# Patient Record
Sex: FEMALE | Race: BLACK OR AFRICAN AMERICAN | NOT HISPANIC OR LATINO | Employment: FULL TIME | URBAN - METROPOLITAN AREA
[De-identification: names, ages, dates, MRNs, and addresses within clinical notes are randomized per-mention and may not be internally consistent; named-entity substitution may affect disease eponyms.]

---
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
## 2022-01-01 ENCOUNTER — HOSPITAL ENCOUNTER (INPATIENT)
Facility: CLINIC | Age: 22
LOS: 1 days | Discharge: HOME | DRG: 832 | End: 2022-01-03
Attending: EMERGENCY MEDICINE | Admitting: INTERNAL MEDICINE
Payer: COMMERCIAL

## 2022-01-01 ENCOUNTER — APPOINTMENT (OUTPATIENT)
Dept: ULTRASOUND IMAGING | Facility: CLINIC | Age: 22
DRG: 832 | End: 2022-01-01
Attending: EMERGENCY MEDICINE
Payer: COMMERCIAL

## 2022-01-01 DIAGNOSIS — R11.2 NAUSEA AND VOMITING, INTRACTABILITY OF VOMITING NOT SPECIFIED, UNSPECIFIED VOMITING TYPE: Primary | ICD-10-CM

## 2022-01-01 DIAGNOSIS — O24.011 PRE-EXISTING TYPE 1 DIABETES MELLITUS DURING PREGNANCY IN FIRST TRIMESTER: ICD-10-CM

## 2022-01-01 DIAGNOSIS — R11.10 HABIT VOMITING: ICD-10-CM

## 2022-01-01 DIAGNOSIS — E10.65 TYPE 1 DIABETES MELLITUS WITH HYPERGLYCEMIA (H): ICD-10-CM

## 2022-01-01 DIAGNOSIS — Z3A.08 8 WEEKS GESTATION OF PREGNANCY: ICD-10-CM

## 2022-01-01 DIAGNOSIS — Z11.52 ENCOUNTER FOR SCREENING LABORATORY TESTING FOR SEVERE ACUTE RESPIRATORY SYNDROME CORONAVIRUS 2 (SARS-COV-2): ICD-10-CM

## 2022-01-01 DIAGNOSIS — O24.011 PRE-EXISTING TYPE 1 DIABETES MELLITUS IN PREGNANCY IN FIRST TRIMESTER: ICD-10-CM

## 2022-01-01 DIAGNOSIS — O99.891 MATERNAL BLOOD TRANSFUSION: ICD-10-CM

## 2022-01-01 DIAGNOSIS — O21.0 HYPEREMESIS GRAVIDARUM: ICD-10-CM

## 2022-01-01 LAB
ALBUMIN SERPL-MCNC: 3.4 G/DL (ref 3.4–5)
ALBUMIN UR-MCNC: 70 MG/DL
ALP SERPL-CCNC: 98 U/L (ref 40–150)
ALT SERPL W P-5'-P-CCNC: 13 U/L (ref 0–50)
ANION GAP SERPL CALCULATED.3IONS-SCNC: 9 MMOL/L (ref 3–14)
APPEARANCE UR: ABNORMAL
AST SERPL W P-5'-P-CCNC: 11 U/L (ref 0–35)
BASOPHILS # BLD AUTO: 0 10E3/UL (ref 0–0.2)
BASOPHILS NFR BLD AUTO: 0 %
BILIRUB SERPL-MCNC: 0.5 MG/DL (ref 0.2–1.3)
BILIRUB UR QL STRIP: NEGATIVE
BUN SERPL-MCNC: 9 MG/DL (ref 7–30)
CALCIUM SERPL-MCNC: 9.1 MG/DL (ref 8.5–10.1)
CHLORIDE BLD-SCNC: 107 MMOL/L (ref 96–110)
CO2 SERPL-SCNC: 22 MMOL/L (ref 20–32)
COLOR UR AUTO: ABNORMAL
CREAT SERPL-MCNC: 0.55 MG/DL (ref 0.5–1)
EOSINOPHIL # BLD AUTO: 0 10E3/UL (ref 0–0.7)
EOSINOPHIL NFR BLD AUTO: 0 %
ERYTHROCYTE [DISTWIDTH] IN BLOOD BY AUTOMATED COUNT: 13.8 % (ref 10–15)
GFR SERPL CREATININE-BSD FRML MDRD: >90 ML/MIN/1.73M2
GLUCOSE BLD-MCNC: 98 MG/DL (ref 70–99)
GLUCOSE BLDC GLUCOMTR-MCNC: 82 MG/DL (ref 70–99)
GLUCOSE UR STRIP-MCNC: NEGATIVE MG/DL
HCG UR QL: POSITIVE
HCO3 BLDV-SCNC: 20 MMOL/L (ref 21–28)
HCT VFR BLD AUTO: 34.3 % (ref 35–47)
HGB BLD-MCNC: 11.6 G/DL (ref 11.7–15.7)
HGB UR QL STRIP: NEGATIVE
IMM GRANULOCYTES # BLD: 0 10E3/UL
IMM GRANULOCYTES NFR BLD: 0 %
KETONES UR STRIP-MCNC: 100 MG/DL
LACTATE BLD-SCNC: 0.6 MMOL/L
LEUKOCYTE ESTERASE UR QL STRIP: ABNORMAL
LIPASE SERPL-CCNC: 75 U/L (ref 73–393)
LYMPHOCYTES # BLD AUTO: 2.6 10E3/UL (ref 0.8–5.3)
LYMPHOCYTES NFR BLD AUTO: 33 %
MAGNESIUM SERPL-MCNC: 1.6 MG/DL (ref 1.6–2.3)
MCH RBC QN AUTO: 27.9 PG (ref 26.5–33)
MCHC RBC AUTO-ENTMCNC: 33.8 G/DL (ref 31.5–36.5)
MCV RBC AUTO: 83 FL (ref 78–100)
MONOCYTES # BLD AUTO: 0.4 10E3/UL (ref 0–1.3)
MONOCYTES NFR BLD AUTO: 5 %
MUCOUS THREADS #/AREA URNS LPF: PRESENT /LPF
NEUTROPHILS # BLD AUTO: 4.7 10E3/UL (ref 1.6–8.3)
NEUTROPHILS NFR BLD AUTO: 62 %
NITRATE UR QL: NEGATIVE
NRBC # BLD AUTO: 0 10E3/UL
NRBC BLD AUTO-RTO: 0 /100
PCO2 BLDV: 32 MM HG (ref 40–50)
PH BLDV: 7.41 [PH] (ref 7.32–7.43)
PH UR STRIP: 6.5 [PH] (ref 5–7)
PLATELET # BLD AUTO: 301 10E3/UL (ref 150–450)
PO2 BLDV: 56 MM HG (ref 25–47)
POTASSIUM BLD-SCNC: 3.7 MMOL/L (ref 3.4–5.3)
PROT SERPL-MCNC: 7.7 G/DL (ref 6.8–8.8)
RBC # BLD AUTO: 4.16 10E6/UL (ref 3.8–5.2)
RBC URINE: 1 /HPF
SAO2 % BLDV: 90 % (ref 94–100)
SODIUM SERPL-SCNC: 138 MMOL/L (ref 133–144)
SP GR UR STRIP: 1.02 (ref 1–1.03)
SQUAMOUS EPITHELIAL: 17 /HPF
TRANSITIONAL EPI: 1 /HPF
TSH SERPL DL<=0.005 MIU/L-ACNC: 0.67 MU/L (ref 0.4–4)
UROBILINOGEN UR STRIP-MCNC: NORMAL MG/DL
WBC # BLD AUTO: 7.7 10E3/UL (ref 4–11)
WBC URINE: 1 /HPF

## 2022-01-01 PROCEDURE — 250N000013 HC RX MED GY IP 250 OP 250 PS 637: Performed by: EMERGENCY MEDICINE

## 2022-01-01 PROCEDURE — 250N000011 HC RX IP 250 OP 636: Performed by: EMERGENCY MEDICINE

## 2022-01-01 PROCEDURE — 82803 BLOOD GASES ANY COMBINATION: CPT

## 2022-01-01 PROCEDURE — 83735 ASSAY OF MAGNESIUM: CPT | Performed by: EMERGENCY MEDICINE

## 2022-01-01 PROCEDURE — 99207 PR APP CREDIT; MD BILLING SHARED VISIT: CPT | Performed by: PHYSICIAN ASSISTANT

## 2022-01-01 PROCEDURE — 84443 ASSAY THYROID STIM HORMONE: CPT | Performed by: EMERGENCY MEDICINE

## 2022-01-01 PROCEDURE — 99285 EMERGENCY DEPT VISIT HI MDM: CPT | Performed by: EMERGENCY MEDICINE

## 2022-01-01 PROCEDURE — G0378 HOSPITAL OBSERVATION PER HR: HCPCS

## 2022-01-01 PROCEDURE — 80053 COMPREHEN METABOLIC PANEL: CPT | Performed by: EMERGENCY MEDICINE

## 2022-01-01 PROCEDURE — 81025 URINE PREGNANCY TEST: CPT | Performed by: EMERGENCY MEDICINE

## 2022-01-01 PROCEDURE — 99285 EMERGENCY DEPT VISIT HI MDM: CPT | Mod: 25 | Performed by: EMERGENCY MEDICINE

## 2022-01-01 PROCEDURE — C9803 HOPD COVID-19 SPEC COLLECT: HCPCS | Performed by: EMERGENCY MEDICINE

## 2022-01-01 PROCEDURE — 258N000003 HC RX IP 258 OP 636: Performed by: EMERGENCY MEDICINE

## 2022-01-01 PROCEDURE — 85025 COMPLETE CBC W/AUTO DIFF WBC: CPT | Performed by: EMERGENCY MEDICINE

## 2022-01-01 PROCEDURE — 81001 URINALYSIS AUTO W/SCOPE: CPT | Performed by: EMERGENCY MEDICINE

## 2022-01-01 PROCEDURE — 99219 PR INITIAL OBSERVATION CARE,LEVEL II: CPT | Mod: FS | Performed by: INTERNAL MEDICINE

## 2022-01-01 PROCEDURE — 96374 THER/PROPH/DIAG INJ IV PUSH: CPT | Performed by: EMERGENCY MEDICINE

## 2022-01-01 PROCEDURE — U0003 INFECTIOUS AGENT DETECTION BY NUCLEIC ACID (DNA OR RNA); SEVERE ACUTE RESPIRATORY SYNDROME CORONAVIRUS 2 (SARS-COV-2) (CORONAVIRUS DISEASE [COVID-19]), AMPLIFIED PROBE TECHNIQUE, MAKING USE OF HIGH THROUGHPUT TECHNOLOGIES AS DESCRIBED BY CMS-2020-01-R: HCPCS | Performed by: EMERGENCY MEDICINE

## 2022-01-01 PROCEDURE — 96361 HYDRATE IV INFUSION ADD-ON: CPT | Performed by: EMERGENCY MEDICINE

## 2022-01-01 PROCEDURE — 83690 ASSAY OF LIPASE: CPT | Performed by: EMERGENCY MEDICINE

## 2022-01-01 PROCEDURE — 76801 OB US < 14 WKS SINGLE FETUS: CPT

## 2022-01-01 PROCEDURE — 36415 COLL VENOUS BLD VENIPUNCTURE: CPT | Performed by: EMERGENCY MEDICINE

## 2022-01-01 RX ORDER — SODIUM CHLORIDE 9 MG/ML
INJECTION, SOLUTION INTRAVENOUS CONTINUOUS
Status: DISCONTINUED | OUTPATIENT
Start: 2022-01-01 | End: 2022-01-03 | Stop reason: HOSPADM

## 2022-01-01 RX ORDER — METOCLOPRAMIDE HYDROCHLORIDE 5 MG/ML
10 INJECTION INTRAMUSCULAR; INTRAVENOUS ONCE
Status: COMPLETED | OUTPATIENT
Start: 2022-01-01 | End: 2022-01-01

## 2022-01-01 RX ORDER — PRENATAL VIT/IRON FUM/FOLIC AC 27MG-0.8MG
1 TABLET ORAL DAILY
Status: DISCONTINUED | OUTPATIENT
Start: 2022-01-02 | End: 2022-01-03 | Stop reason: HOSPADM

## 2022-01-01 RX ORDER — PYRIDOXINE HCL (VITAMIN B6) 25 MG
25 TABLET ORAL ONCE
Status: COMPLETED | OUTPATIENT
Start: 2022-01-01 | End: 2022-01-01

## 2022-01-01 RX ADMIN — SODIUM CHLORIDE 1000 ML: 9 INJECTION, SOLUTION INTRAVENOUS at 22:49

## 2022-01-01 RX ADMIN — SODIUM CHLORIDE 1000 ML: 9 INJECTION, SOLUTION INTRAVENOUS at 20:39

## 2022-01-01 RX ADMIN — DOXYLAMINE SUCCINATE 25 MG: 25 TABLET ORAL at 22:45

## 2022-01-01 RX ADMIN — METOCLOPRAMIDE HYDROCHLORIDE 10 MG: 5 INJECTION INTRAMUSCULAR; INTRAVENOUS at 20:39

## 2022-01-01 RX ADMIN — Medication 25 MG: at 22:45

## 2022-01-01 ASSESSMENT — ENCOUNTER SYMPTOMS
DYSURIA: 0
ABDOMINAL PAIN: 0
APPETITE CHANGE: 1
VOMITING: 1
NAUSEA: 1
COUGH: 0
DIARRHEA: 1
CONSTIPATION: 0
SHORTNESS OF BREATH: 0
FEVER: 0

## 2022-01-02 PROBLEM — O24.011 PRE-EXISTING TYPE 1 DIABETES MELLITUS DURING PREGNANCY IN FIRST TRIMESTER: Status: ACTIVE | Noted: 2022-01-02

## 2022-01-02 LAB
ABO/RH(D): NORMAL
ANION GAP SERPL CALCULATED.3IONS-SCNC: 10 MMOL/L (ref 3–14)
ANTIBODY SCREEN: NEGATIVE
BUN SERPL-MCNC: 9 MG/DL (ref 7–30)
C TRACH DNA SPEC QL NAA+PROBE: NEGATIVE
CALCIUM SERPL-MCNC: 8.2 MG/DL (ref 8.5–10.1)
CHLORIDE BLD-SCNC: 109 MMOL/L (ref 96–110)
CO2 SERPL-SCNC: 19 MMOL/L (ref 20–32)
CREAT SERPL-MCNC: 0.63 MG/DL (ref 0.5–1)
ERYTHROCYTE [DISTWIDTH] IN BLOOD BY AUTOMATED COUNT: 13.9 % (ref 10–15)
GFR SERPL CREATININE-BSD FRML MDRD: >90 ML/MIN/1.73M2
GLUCOSE BLD-MCNC: 234 MG/DL (ref 70–99)
GLUCOSE BLDC GLUCOMTR-MCNC: 132 MG/DL (ref 70–99)
GLUCOSE BLDC GLUCOMTR-MCNC: 150 MG/DL (ref 70–99)
GLUCOSE BLDC GLUCOMTR-MCNC: 198 MG/DL (ref 70–99)
GLUCOSE BLDC GLUCOMTR-MCNC: 211 MG/DL (ref 70–99)
GLUCOSE BLDC GLUCOMTR-MCNC: 220 MG/DL (ref 70–99)
GLUCOSE BLDC GLUCOMTR-MCNC: 225 MG/DL (ref 70–99)
HCT VFR BLD AUTO: 32.4 % (ref 35–47)
HGB BLD-MCNC: 10.4 G/DL (ref 11.7–15.7)
MCH RBC QN AUTO: 27.5 PG (ref 26.5–33)
MCHC RBC AUTO-ENTMCNC: 32.1 G/DL (ref 31.5–36.5)
MCV RBC AUTO: 86 FL (ref 78–100)
N GONORRHOEA DNA SPEC QL NAA+PROBE: NEGATIVE
PLATELET # BLD AUTO: 237 10E3/UL (ref 150–450)
POTASSIUM BLD-SCNC: 3.9 MMOL/L (ref 3.4–5.3)
RBC # BLD AUTO: 3.78 10E6/UL (ref 3.8–5.2)
SARS-COV-2 RNA RESP QL NAA+PROBE: NEGATIVE
SODIUM SERPL-SCNC: 138 MMOL/L (ref 133–144)
SPECIMEN EXPIRATION DATE: NORMAL
WBC # BLD AUTO: 6.7 10E3/UL (ref 4–11)

## 2022-01-02 PROCEDURE — 87506 IADNA-DNA/RNA PROBE TQ 6-11: CPT | Performed by: PHYSICIAN ASSISTANT

## 2022-01-02 PROCEDURE — 250N000012 HC RX MED GY IP 250 OP 636 PS 637: Performed by: NURSE PRACTITIONER

## 2022-01-02 PROCEDURE — 250N000013 HC RX MED GY IP 250 OP 250 PS 637: Performed by: PHYSICIAN ASSISTANT

## 2022-01-02 PROCEDURE — 99232 SBSQ HOSP IP/OBS MODERATE 35: CPT | Performed by: INTERNAL MEDICINE

## 2022-01-02 PROCEDURE — 120N000002 HC R&B MED SURG/OB UMMC

## 2022-01-02 PROCEDURE — 99223 1ST HOSP IP/OBS HIGH 75: CPT | Performed by: NURSE PRACTITIONER

## 2022-01-02 PROCEDURE — 82962 GLUCOSE BLOOD TEST: CPT

## 2022-01-02 PROCEDURE — G0378 HOSPITAL OBSERVATION PER HR: HCPCS

## 2022-01-02 PROCEDURE — 96372 THER/PROPH/DIAG INJ SC/IM: CPT

## 2022-01-02 PROCEDURE — 82310 ASSAY OF CALCIUM: CPT | Performed by: PHYSICIAN ASSISTANT

## 2022-01-02 PROCEDURE — 36415 COLL VENOUS BLD VENIPUNCTURE: CPT | Performed by: PHYSICIAN ASSISTANT

## 2022-01-02 PROCEDURE — 87591 N.GONORRHOEAE DNA AMP PROB: CPT | Performed by: PHYSICIAN ASSISTANT

## 2022-01-02 PROCEDURE — 96372 THER/PROPH/DIAG INJ SC/IM: CPT | Performed by: NURSE PRACTITIONER

## 2022-01-02 PROCEDURE — 36415 COLL VENOUS BLD VENIPUNCTURE: CPT | Performed by: EMERGENCY MEDICINE

## 2022-01-02 PROCEDURE — 87491 CHLMYD TRACH DNA AMP PROBE: CPT | Performed by: PHYSICIAN ASSISTANT

## 2022-01-02 PROCEDURE — 86901 BLOOD TYPING SEROLOGIC RH(D): CPT | Performed by: EMERGENCY MEDICINE

## 2022-01-02 PROCEDURE — 85027 COMPLETE CBC AUTOMATED: CPT | Performed by: PHYSICIAN ASSISTANT

## 2022-01-02 PROCEDURE — 258N000003 HC RX IP 258 OP 636: Performed by: EMERGENCY MEDICINE

## 2022-01-02 PROCEDURE — 87493 C DIFF AMPLIFIED PROBE: CPT | Performed by: PHYSICIAN ASSISTANT

## 2022-01-02 PROCEDURE — 250N000011 HC RX IP 250 OP 636: Performed by: PHYSICIAN ASSISTANT

## 2022-01-02 PROCEDURE — 96375 TX/PRO/DX INJ NEW DRUG ADDON: CPT

## 2022-01-02 PROCEDURE — 250N000012 HC RX MED GY IP 250 OP 636 PS 637: Performed by: PHYSICIAN ASSISTANT

## 2022-01-02 PROCEDURE — 96372 THER/PROPH/DIAG INJ SC/IM: CPT | Performed by: PHYSICIAN ASSISTANT

## 2022-01-02 RX ORDER — DEXTROSE MONOHYDRATE 25 G/50ML
25-50 INJECTION, SOLUTION INTRAVENOUS
Status: DISCONTINUED | OUTPATIENT
Start: 2022-01-02 | End: 2022-01-03 | Stop reason: HOSPADM

## 2022-01-02 RX ORDER — ONDANSETRON 4 MG/1
4 TABLET, ORALLY DISINTEGRATING ORAL EVERY 6 HOURS PRN
Status: DISCONTINUED | OUTPATIENT
Start: 2022-01-02 | End: 2022-01-03 | Stop reason: HOSPADM

## 2022-01-02 RX ORDER — ONDANSETRON 2 MG/ML
4 INJECTION INTRAMUSCULAR; INTRAVENOUS EVERY 6 HOURS PRN
Status: DISCONTINUED | OUTPATIENT
Start: 2022-01-02 | End: 2022-01-03 | Stop reason: HOSPADM

## 2022-01-02 RX ORDER — FAMOTIDINE 20 MG/1
20 TABLET, FILM COATED ORAL 2 TIMES DAILY
Status: DISCONTINUED | OUTPATIENT
Start: 2022-01-02 | End: 2022-01-03 | Stop reason: HOSPADM

## 2022-01-02 RX ORDER — LIDOCAINE 40 MG/G
CREAM TOPICAL
Status: DISCONTINUED | OUTPATIENT
Start: 2022-01-02 | End: 2022-01-03 | Stop reason: HOSPADM

## 2022-01-02 RX ORDER — NICOTINE POLACRILEX 4 MG
15-30 LOZENGE BUCCAL
Status: DISCONTINUED | OUTPATIENT
Start: 2022-01-02 | End: 2022-01-03 | Stop reason: HOSPADM

## 2022-01-02 RX ORDER — PYRIDOXINE HCL (VITAMIN B6) 25 MG
25 TABLET ORAL 3 TIMES DAILY
Status: DISCONTINUED | OUTPATIENT
Start: 2022-01-02 | End: 2022-01-03 | Stop reason: HOSPADM

## 2022-01-02 RX ADMIN — Medication 25 MG: at 20:07

## 2022-01-02 RX ADMIN — SODIUM CHLORIDE: 9 INJECTION, SOLUTION INTRAVENOUS at 00:22

## 2022-01-02 RX ADMIN — DOXYLAMINE SUCCINATE 25 MG: 25 TABLET ORAL at 22:02

## 2022-01-02 RX ADMIN — ONDANSETRON 4 MG: 2 INJECTION INTRAMUSCULAR; INTRAVENOUS at 06:38

## 2022-01-02 RX ADMIN — INSULIN ASPART 2 UNITS: 100 INJECTION, SOLUTION INTRAVENOUS; SUBCUTANEOUS at 10:12

## 2022-01-02 RX ADMIN — FAMOTIDINE 20 MG: 20 TABLET ORAL at 09:06

## 2022-01-02 RX ADMIN — INSULIN ASPART 1 UNITS: 100 INJECTION, SOLUTION INTRAVENOUS; SUBCUTANEOUS at 01:56

## 2022-01-02 RX ADMIN — FAMOTIDINE 20 MG: 20 TABLET ORAL at 20:07

## 2022-01-02 RX ADMIN — INSULIN ASPART 2 UNITS: 100 INJECTION, SOLUTION INTRAVENOUS; SUBCUTANEOUS at 14:26

## 2022-01-02 RX ADMIN — INSULIN ASPART 3 UNITS: 100 INJECTION, SOLUTION INTRAVENOUS; SUBCUTANEOUS at 15:27

## 2022-01-02 RX ADMIN — INSULIN ASPART 4 UNITS: 100 INJECTION, SOLUTION INTRAVENOUS; SUBCUTANEOUS at 19:03

## 2022-01-02 RX ADMIN — INSULIN GLARGINE 6 UNITS: 100 INJECTION, SOLUTION SUBCUTANEOUS at 09:06

## 2022-01-02 RX ADMIN — DEXTROSE AND SODIUM CHLORIDE: 5; 900 INJECTION, SOLUTION INTRAVENOUS at 00:23

## 2022-01-02 RX ADMIN — INSULIN ASPART 2 UNITS: 100 INJECTION, SOLUTION INTRAVENOUS; SUBCUTANEOUS at 06:21

## 2022-01-02 ASSESSMENT — ACTIVITIES OF DAILY LIVING (ADL)
ADLS_ACUITY_SCORE: 3
ADLS_ACUITY_SCORE: 3
DIFFICULTY_EATING/SWALLOWING: NO
ADLS_ACUITY_SCORE: 3
DOING_ERRANDS_INDEPENDENTLY_DIFFICULTY: NO
ADLS_ACUITY_SCORE: 3
DRESSING/BATHING_DIFFICULTY: NO
ADLS_ACUITY_SCORE: 3
ADLS_ACUITY_SCORE: 3
TOILETING_ISSUES: NO
ADLS_ACUITY_SCORE: 3
ADLS_ACUITY_SCORE: 7
ADLS_ACUITY_SCORE: 3
DIFFICULTY_COMMUNICATING: NO
FALL_HISTORY_WITHIN_LAST_SIX_MONTHS: NO
WALKING_OR_CLIMBING_STAIRS_DIFFICULTY: NO
ADLS_ACUITY_SCORE: 7
ADLS_ACUITY_SCORE: 3

## 2022-01-02 ASSESSMENT — MIFFLIN-ST. JEOR: SCORE: 1263.05

## 2022-01-02 NOTE — PLAN OF CARE
"VS: /71   Pulse 88   Temp 98.8  F (37.1  C) (Oral)   Resp 16   Ht 1.499 m (4' 11\")   Wt 58.2 kg (128 lb 6.4 oz)   LMP  (LMP Unknown)   SpO2 97%   BMI 25.93 kg/m     O2: >95% on RA   Output: Voiding without difficulty   Last BM: LBM (Smear) overnight, BS+, denies having any stools today.   Activity: Independent   Skin: Intact ex scabbing/bruising to bilateral knees/shins   Pain: Denies   CMS: Intact   Dressing: N/A   Diet: Nausea improving   PRN IV zofran not given, T1DM, q4 BG, elevated with insulin given per orders. Carb coverage and sliding scale    LDA: R arm infusing continuous NS   Equipment: IV pole   Plan: Continue to monitor   Additional Info: 9 weeks pregnant     "

## 2022-01-02 NOTE — UTILIZATION REVIEW
Admission Status; Secondary Review Determination         Under the authority of the Utilization Management Committee, the utilization review process indicated a secondary review on the above patient.  The review outcome is based on review of the medical records, discussions with staff, and applying clinical experience noted on the date of the review.        (x)      Inpatient Status Appropriate - This patient's medical care is consistent with medical management for inpatient care and reasonable inpatient medical practice.      RATIONALE FOR DETERMINATION   The patient is a 21-year-old female  1 para 0 with type 1 diabetes mellitus who was admitted to the hospital on 2022.  She is approximately 8 weeks and 4 days pregnant and has had intractable nausea and vomiting is unable to maintain fluid or any oral intake she is 9 weeks and 2 days based on last menstrual period.  Endocrinology has been consulted.  Type 1 diabetes mellitus creates additional risk with persisting nausea and vomiting she is also had watery diarrhea associated with cramping she has had some low blood sugars possibly bordering on DKA based on previous experience and blood sugar of 64 at home.  Based on pregnancy with diabetes mellitus type 1 and intractable vomiting possibly related to hyperemesis gravidarum or other cause, inpatient status continues to be appropriate given inability to function outside the hospital without supportive IV fluids and assistance in managing diabetes      The severity of illness, intensity of service provided, expected LOS and risk for adverse outcome make the care complex, high risk and appropriate for hospital admission.        The information on this document is developed by the utilization review team in order for the business office to ensure compliance.  This only denotes the appropriateness of proper admission status and does not reflect the quality of care rendered.         The definitions of  Inpatient Status and Observation Status used in making the determination above are those provided in the CMS Coverage Manual, Chapter 1 and Chapter 6, section 70.4.      Sincerely,     Raheem Durand MD  Physician Advisor  Utilization Review/ Case Management  [facility].

## 2022-01-02 NOTE — CONSULTS
"In-Patient Diabetes/Hyperglycemia Management Consult    Chief Complaint:  \"8-9 wks pregnant, DM I, advise for insulin adjustments\"    Consult requested by: Tiny Rea PA    All information gathered via chart review and face-to-face interview and assessment    History of Present Illness:  Myriam Wylie is a 21 year old woman with a history of DM I, DKA, and C. difficile (2016) who is 8 wks 4 days pregnant and presented to ED  with intractable nausea and vomiting .  She is being admitted for continued management of nausea & vomiting, IV fluid replenishment, and stabilization of blood sugars.    Diabetes:    Ms. Myriam Wylie is a 21 year old diagnosed with T1DM 3/28/2005 at approximately 2.5 years of age.  She is known to have hx of several (>10-15) DKA episodes per recollection.  Myriam is followed by Dr. Padmini Hamilton [facility] and was last seen 11/18/2021, A1c 8.8%.  Prior to that visit was May 2020 via telehealth and prior in-person clinic appointment was 10/2019.    Primary Endocrinologist is not yet aware patient is pregnant.     Clarice uses CDI and dexcom for diabetes control, she has tried an amb pump in past when she was much younger but this did not work and would become disconnected easily in her sleep resulting in DKA episodes.  Confirmed per chart review when she was seeing SAMANTHA Singh and DRAKE Rayo of  of  she was using an amb pump - model not clearly indicated.    She prefers MDI/dexcom. She is not wearing Dexcom now, she was having difficulty with insurance and has not been wearing since at or around her last clinic appointment. Is comfortable with fingersticks but prefers Dexcom.     Dexcom reading on that last visit 11/18/2021:    Below 54 mg/dL Less than 1%  Below 70 mg/dL Less then 4%  70 - 180 mg/dL 70% or more  Above 180 mg/dL Less than 25%  Above 250 mg/dL Less than 5%   Coefficient of Ellen 36% or less      At her last visit, her Lantus was reduced " following frequent low BGs to 14 unit(s) per day from 15 unit(s) per day  Novolog 1 per 10 g cho from 1 per 7 g cho meals/snacks   Novolog sliding scale insulin has remained 1:50>150 mg/dL    Fingerstick BG trends per patient:    AM - 300's (does not get up until about noon)  Noon - see above  Dinner - 180 - 300  HS - 120 - 250  Infrequent lows    In the event of lows, nik Rubin) has the dexcom opal on his phone and will wake up and assist Makenzie with getting juice prn, this is effective when dexcom set up and working.     Labs:  Covid 19 negative    Hgb 11.6->10.4  K and NA - wnl  No AG   Creat 0.55/GFR >90  Ketones - present in UA   Hgb A1c 8.7%  C-diff - to be collected    TSH 0.67      BG trend since admission:    AT time of consult:  q4h BG monitoring with medium resistance sliding scale insulin ordered  No basal  No prandial      Most recently Myriam has been compliant with Lantus 14 unit(s) daily at 1200 noon, last dose of 12 unit(s) taken on day of presentation (1/1/2022) at 12:30 per patient report. She reduced 2/2 ongoing N/V.    Does typically take Novolog 1 per 10 g cho and medium resistance sliding scale insulin.    She feel able to start PO and has apple and orange juice and yogurt at bedside.   Aware of the current orders and has no additional questions or concerns at time of consult    Confounding factors:  Hyperemesis gravidarum, no steroids, no dextrose fluids    ELOS:  TBD    Diabetes:  Recent Labs   Lab 01/02/22  0620 01/02/22  0551 01/02/22  0139 01/01/22 2011 01/01/22  1821   * 234* 150* 98 82       Diabetes Type:  Type 1 Diabetes  Diabetes Duration: age 4 -  3/28/2005    Usual Diabetes Regimen:     PTA Medications:   Lantus 14 unit(s) daily at 1200  Novolog 1 per 10 g cho from 1 per 7 g cho meals/snacks   Novolog sliding scale insulin has remained 1:50>150 mg/dL  Dexcom CGM      BG monitoring frequency:  Now using fingersticks, 2-3 times per day  Needs refills/insurance  "clarification for Dexcom      Diet: no restrictions  Bf: rarely eats  Lunch: snacks usually only - yogurt/chips  Dinner: varies - prefers chicken/beef, few vegetables, noodles/rice  Snacks: will chew apples or grapes and spit them out, fruit snack packs/crackers, PB/J or cheese crackers  Beverages: water, sometimes juice    Safety Kit:  Has glucose tabs but does not like them \"too chalky\", will use fruit snacks or juice  Hx of glucagon injectable - has not has a refill for some time.     Medic Alert: No - has medical information on iPhone and Apple watch      Ability to [place] Prescribed Regimen: TBD     Other active medical problems:    Diabetes Control:   Lab Results   Component Value Date    A1C 8.7 12/23/2021    A1C 9.5 04/17/2021    A1C 9.7 10/11/2019    A1C 11.3 09/15/2019    A1C 12.3 12/09/2018    A1C 11.7 12/08/2018     Diabetes Complications: no retinopathy, last dilated eye exam 3/2021, denies peripheral neuropathy, no nephropathy  History of DKA:   Yes > 10    Able to Detect Hypoglycemia: yes - uses Dexcom which helps ID lows especially at [facility]  Usual Diabetes Care Provider: Dr. Padmini Givens - [facility]    Factors Impacting Glucose Control:  Hyperemesis/acute illness, diet/eating changes, pregnancy    Review of Systems:    CC:  \"denies - nausea pretty much gone\"  Constitutional:   No fever, no chills  ENT/Mouth:   No hearing changes, no ear pain, no sore throat, no rhinorrhea, no difficulty swallowing  Eyes:  No eye pain, no discharge, no vision changes  CV:   No CP/SOB, no new edema  Resp:  No cough, no wheezing  GI:   No constipation, + diarrhea  :  No dysuria, no frequency, no hematuria  Musk:  No joint swelling/pain, No back pain  Skin/heme:   No new rashes/bruises/open areas.  No pruritis  Neuro:   No new weakness, no numbness/tingling, no headache  Psych:   No new anxiety, no depression  Endocrine:  No polyuria, no polydipsia      Past medical, family and social histories are reviewed " and updated.    Past Medical History  Past Medical History:   Diagnosis Date     C. difficile colitis      Diabetes type 1, uncontrolled (H)      DKA (diabetic ketoacidosis) (H)      PID (acute pelvic inflammatory disease)        Family History  Family History   Problem Relation Age of Onset     Diabetes No family hx of         type 1 diabetes or autoimmunity     Denies any family hx of DM    Social History  Social History     Socioeconomic History     Marital status: Single     Spouse name: None     Number of children: None     Years of education: None     Highest education level: None   Occupational History     None   Tobacco Use     Smoking status: Never Smoker     Smokeless tobacco: Never Used   Substance and Sexual Activity     Alcohol use: Not Currently     Drug use: No     Sexual activity: Yes     Partners: Female, Male     Birth control/protection: None     Comment: h/o PID   Other Topics Concern     None   Social History Narrative    Myriam lives with her mother and step father.  She reports that she has 8 siblings on her mother's side and 11 on her father's side, all half siblings.  She is in the 7th grade after being held back a couple years ago due to missing so much school.  She is a good student, however, currently getting all A's. Favorite subject is math.  In the future she wants to be a , a shen or an [employer] agent.        Children's may be getting Child Protection involved.        Dec 2015--this is a child protection case.  Mom and Dad both here today.  Dad and Clarice blame each other for her not getting her cares done, mom says she used to take care of Clarice's diabetes but hasn't been because she works.  Says she is now going to start doing so.        Jan 2016-excited about her new phone.  Mom gave it to her with the condition that she test 4x/day but thusfar this isn't happening.        March 2016-wants to start volleyball. Still an open child protection case.        May 2016.  Clarice is  in a group home, which she hates.  There is concern that she is manipulating her blood glucose levels to try to get out of the group home.        June 2016. Clarice has been at [facility] 2 months.  She wants to go home.  Glucose control has been steadily improving while she is there, so the structure has been good for her.        Sept 2016.  In a new foster home which she likes (this woman has previously done respite care for her), but it can only last until early Oct when this woman goes out of town.  She was diagnosed with an eating disorder and has started the [facility].        October 2016.  Still in the foster home she was in last month ago but it is not clear how involved this woman is with her diabetes.  She is going home for a week tomorrow while the foster mom is on vacation.  Now it has been determined (as I have all along maintained) that she does not have an eating disorder.        Dec 2016. Back with Mom.  They don't have a place of their own yet so they are staying with a friend of Mom in a 1 bedroom apartment in [place].  They sleep on the couch pull-out.  Mom drops Clarice off at school on her way to work. Clarice says kids in school are mean to her.        Feb 2017. Out of strips because of confusing insurance issue.  For some reason she is on [employer] Plus for this month only but then March 1 goes to [employer] but then April may go back to [place]. The problem comes up because each plan allows different meters and strips.  She is doing a dance program after school twice a week and loves it.        April 2017. Still open child protection case. I have been in contact with the guardian lisa murphy. She is on spring break, going into work each day with Mom at [employer].        May 2017. Got her new insulin pump on Tuesday May 2, excited about it.  Dance performances coming up.        June 2017--home with Mom, child protection still involved.  School just ended (8th grade).  No particular plans for summer but will  "be home alone and with her friends.  Plans to dance, walk and swim.        July 2017--Child protection still involved. Not really doing anything this summer but sleeping during the day and up on the computer at night.  Trying to decide between 3 high schools, all of which have accepted her.        August 2017. 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  Dance class a couple times a week, otherwise no exercise. Happy to be back on the pump.        Oct 2017.  Excited about performance she will have in January.        Nov 2017.  Mom is working long hours 6 days a week. They are trying to buy a house in [place].        Jan 2018.  Danny is enjoying school and is very excited because she is playing the lead in a play at the [employer] Jan 20 and 21.     Social Determinants of Health     Financial Resource Strain: Not on file   Food Insecurity: Not on file   Transportation Needs: Not on file   Physical Activity: Not on file   Stress: Not on file   Social Connections: Not on file   Intimate Partner Violence: Not on file   Housing Stability: Not on file       Physical Exam:  /62 (BP Location: Left arm)   Pulse 101   Temp 98.7  F (37.1  C) (Oral)   Resp 16   Ht 1.499 m (4' 11\")   Wt 58.2 kg (128 lb 6.4 oz)   LMP  (LMP Unknown)   SpO2 98%   BMI 25.93 kg/m        General:  pleasant sitting on bed, in no distress. Tray of juices, yogurt on table.   HEENT: NC/AT, PER and anicteric, non-injected, oral mucous membranes moist.   Lungs: non-labored, no cough, no SOB  ABD:  soft, non-tender  Skin: warm and dry, no obvious lesions  Feet:  CMS intact  MSK:  fluid movement of all extremities  Lymp:  no LE edema   Mental status:  alert, oriented x3, communicating clearly  Psych: calm, appropriate mood, appropriate/full affect      Laboratory  Recent Labs   Lab Test 01/02/22  0620 01/02/22  0551 01/02/22  0139 01/01/22 2011   NA  --  138  --  138   POTASSIUM  --  3.9  --  3.7 "   CHLORIDE  --  109  --  107   CO2  --  19*  --  22   ANIONGAP  --  10  --  9   * 234*   < > 98   BUN  --  9  --  9   CR  --  0.63  --  0.55   LILIANA  --  8.2*  --  9.1    < > = values in this interval not displayed.     CBC RESULTS:   Recent Labs   Lab Test 01/02/22  0551   WBC 6.7   RBC 3.78*   HGB 10.4*   HCT 32.4*   MCV 86   MCH 27.5   MCHC 32.1   RDW 13.9          Liver Function Studies -   Recent Labs   Lab Test 01/01/22 2011   PROTTOTAL 7.7   ALBUMIN 3.4   BILITOTAL 0.5   ALKPHOS 98   AST 11   ALT 13       Active Medications  Current Facility-Administered Medications   Medication     glucose gel 15-30 g    Or     dextrose 50 % injection 25-50 mL    Or     glucagon injection 1 mg     doxylamine (UNISOM) tablet 25 mg     famotidine (PEPCID) tablet 20 mg     insulin aspart (NovoLOG) injection (RAPID ACTING)     insulin glargine (LANTUS PEN) injection 6 Units     lidocaine (LMX4) cream     lidocaine 1 % 0.1-1 mL     ondansetron (ZOFRAN-ODT) ODT tab 4 mg    Or     ondansetron (ZOFRAN) injection 4 mg     prenatal multivitamin w/iron per tablet 1 tablet     pyridOXINE (VITAMIN B6) tablet 25 mg     sodium chloride (PF) 0.9% PF flush 3 mL     sodium chloride (PF) 0.9% PF flush 3 mL     sodium chloride 0.9% infusion     No current outpatient medications on file.       Current Diet  Orders Placed This Encounter      Advance Diet as Tolerated: Regular Diet Adult    Assessment:    1)  Type 1 Diabetes Mellitus; historically suboptimal control.  A1c 8.7%. No known diabetic complications. (anti-CHERI found in EPIC)  2)  Hyperemesis gravidarum  3)  Loose stools      Plan:      -  Give reduced dose of Lantus 6 unit(s) now this AM - will adjust as PO changes    -  Novolog 1 per 10 g cho now that po resumed TID AC/snacks    -  Novolog medium resistance sliding scale insulin q4h - adjusted for pregnancy 1:50>100 and 1:50>120    -  BG monitoring q4h, add 2 hr PP for when PO resumes    -  Hypoglycemia protocol    -  Carb  counting protocol    -  Education:  Will need outline for pregnancy targets for BG, not clear if additional Ed will be needed    -  Outpatient follow up: KEVIN Hamilton MD - next appointment scheduled for 2/22/2022 - will recommend sooner - 7-10 days following discharge.    -  Will follow    Management of Diabetes in pregnancy:    BG targets  Less than 95 fasting  Less than 140 one hour post-meal  Less than 120 two hours post-meal  Goal of 0 episodes of blood sugars less than 60     A1c goal in pregnancy: 6.5% or less      RASHID Velez CNP   Inpatient Diabetes Management Service  Pager - [phone]  Friday - Monday 0800 - 1600 hrs    To contact Endocrine Diabetes service:   From 8AM-4PM: page inpatient diabetes provider that is following the patient that day (see filed or incomplete progress notes/consult notes).  If uncertain of provider assignment: page job code [phone].  For questions or updates from 4PM-8AM: page the diabetes job code for on call fellow: [phone]    Please notify inpatient diabetes service if changes are planned to steroids, nutrition, or if procedures are planned requiring prolonged NPO status.Diabetes Management Team job code: [phone]       I spent a total of 80 minutes bedside and on the inpatient unit managing glycemic care.  Over 50% of my time on the unit was spent counseling the patient and/or coordinating care regarding acute hyperglycemic management.  See note for details.

## 2022-01-02 NOTE — PLAN OF CARE
Arrived to unit at 0100, oriented to room  VS: Vital signs:  Temp: 99.1  F (37.3  C) Temp src: Oral BP: 126/69 Pulse: 98   Resp: 16 SpO2: 99 % O2 Device: None (Room air)   O2: RA, lungs CTA, denies SOB/chest pain   Output: Voiding without difficulty   Last BM: LBM 1/1, reporting diarrhea, BS+   Activity: Independent   Skin: Intact ex scabbing/bruising to bilateral knees/shins   Pain: Denies   CMS: A&O x4, denies N/T   Dressing: None   Diet: Taking small sips of water overnight, continuous nausea  PRN IV zofran given x1, emesis once this shift  T1DM, q4 BG, 150 and 220 overnight with insulin given per orders   LDA: New PIV placed by RN flyer, R arm infusing   Equipment: IV pole   Plan: Continue to monitor   Additional Info: 9 weeks pregnant

## 2022-01-02 NOTE — PROGRESS NOTES
"[facility]    Medicine Progress Note - Hospitalist Service       Date of Admission:  1/1/2022    Assessment & Plan         Myriam Wylie is a 21 year old woman with a history of DM I, DKA, and C. difficile (2016) who is 8 wks 4 days pregnant and presented to ED  with intractable nausea and vomiting .  She is being admitted for continued management of nausea & vomiting, IV fluid replenishment, and stabilization of blood sugars     # Intractable Nausea, Vomiting, Loose Stools. Has been nauseous over several weeks of pregnancy but vomiting and loose stools are new since yesterday. No abdominal pain or  sx. Afebrile. No leukocytosis. Is unvaccinated for influenza and covid. Differential to include N/V related to 1st trimester pregnancy, hyperemesis gravidarum, +/- infectious gastroenteritis (including covid-19 in differential). LFTs wnl.   Continue IV fluids for now   - Influenza and COVID 19 negative   - ED has ordered stool MARIZA and C diff PCR., but rosalva has not had diarrhea since - sample awaited   - Schedule vitamin B6 and doxylamine.   - PRN antiemetics.   - Offer influenza and covid vaccines at discharge.         #Pregnancy. This is her 1st pregnancy. Had + home test and is 9 wks 2 days based on LMP, per prelim US report 8 wks 4 days.   - Final OB US read pending.   - Prenatal vitamin.   - Has appointment to establish OB care through [facility] on 2/9/22.          #DM I. HgbA1c 8.7% w/ prior hx of DKA (as recently as early December in setting of what she thought was food poisoning) but no evidence for this here. BG 82-98 in ED. Followed by [facility] endocrinology. Home regimen Lantus 15 units every afternoon, Novolog 1 unit per 10 g CHO, and correction scale 1:50. Has been too tightly controlled at home.   - For tonight, Q4hr BG checks w/ \"medium\" correction scale if needed.   - Already took home Lantus 15 units this afternoon. No long acting or carb " "coverage insulin has been ordered here; need to readdress tomorrow pending BG trend. Endocrinology consult has been placed.  Patient does not take her lantus regularly either. \" I take it if I eat something\". Given her inconsistency in long acting insulin administration, we will have out endocrinology team weigh in on this   - Has endocrinology f/u on 2/21 but this may need to be moved up pending endocrine reccs here and different needs during pregnancy.                Diet: Advance Diet as Tolerated: Regular Diet Adult    DVT Prophylaxis: Pneumatic Compression Devices  [place] Catheter: Not present  Central Lines: None  Code Status: Full Code      Disposition Plan Expected Discharge: 1/3/2021       The patient's care was discussed with the Bedside Nurse and Patient.    Marlo Sullivan MD  Hospitalist Service  [facility]  Securely message with the Vocera Web Console (learn more here)  Text page via "[employer] Paging/Directory        Clinically Significant Risk Factors Present on Admission          # Hypocalcemia: Ca = 8.2 mg/dL (Ref range: 8.5 - 10.1 mg/dL) and/or iCa = N/A on admission, will replace as needed       # Overweight: last Body mass index is 25.63 kg/m .      ______________________________________________________________________    Interval History   Patient continues to have nausea, with last episode of emesis at 630 am.  Has not been able to each much since  Last episode of diarrhea was last night   In a pleasant mood this morning. No fever or chills     Data reviewed today: I reviewed all medications, new labs and imaging results over the last 24 hours. I personally reviewed no images or EKG's today.    Physical Exam   Vital Signs: Temp: 99.1  F (37.3  C) Temp src: Oral BP: 126/69 Pulse: 98   Resp: 16 SpO2: 99 % O2 Device: None (Room air)    Weight: 126 lbs 14.4 oz  General Appearance: Awake, alert and not in distress  Respiratory: Clear breath " sounds bilaterally   Cardiovascular: Normal heart sounds. No murmurs   GI: Soft, non tender. Normal bowel sounds   Skin: No bruising or bleeding   Other:Awake, alert and orientated X 3       Data   Recent Labs   Lab 01/02/22  0620 01/02/22  0551 01/02/22  0139 01/01/22 2011   WBC  --  6.7  --  7.7   HGB  --  10.4*  --  11.6*   MCV  --  86  --  83   PLT  --  237  --  301   NA  --  138  --  138   POTASSIUM  --  3.9  --  3.7   CHLORIDE  --  109  --  107   CO2  --  19*  --  22   BUN  --  9  --  9   CR  --  0.63  --  0.55   ANIONGAP  --  10  --  9   LILIANA  --  8.2*  --  9.1   * 234* 150* 98   ALBUMIN  --   --   --  3.4   PROTTOTAL  --   --   --  7.7   BILITOTAL  --   --   --  0.5   ALKPHOS  --   --   --  98   ALT  --   --   --  13   AST  --   --   --  11   LIPASE  --   --   --  75

## 2022-01-02 NOTE — ED TRIAGE NOTES
Patient thinks she's going into DKA. History of DM 1. Has been vomiting, unable to keep any food down. Last BG at home was 64. 9 weeks pregnant.

## 2022-01-02 NOTE — ED PROVIDER NOTES
ED Provider Note  [facility]      History     Chief Complaint   Patient presents with     Hypoglycemia     9 weeks pregnant, last BG at home 64, thinks she going into DKA     Vomiting     HPI  Myriam Wylie is a 21 year old female who is 9 weeks pregnant with her first pregnancy based on last menstrual period, with PMH type 1 diabetes, DKA, C. difficile, and pelvic inflammatory disease, who presents to the Emergency Department for vomiting and hypoglycemia.  Patient states that she felt that she was going to an episode of DKA. She states that she has been not been able to keep down food.  Her last blood sugar at home was 64.  Patient had a positive home pregnancy test but has not had a formal ultrasound or laboratory confirmation.  She has had significant nausea since the onset of her pregnancy but over the past few days developed severe vomiting.  She has vomited and had diarrhea today innumerable times.  She is taking her normal insulin regimen.  She denies vaginal bleeding, discharge, odor, concern for STD.  She has had less than normal urine output.  Diarrhea is watery and associated with some abdominal cramping.  She has not been vaccinated against COVID-19 or the flu.  Denies recent antibiotics.          Past Medical History  Past Medical History:   Diagnosis Date     C. difficile colitis      Diabetes type 1, uncontrolled (H)      DKA (diabetic ketoacidosis) (H)      PID (acute pelvic inflammatory disease)      Past Surgical History:   Procedure Laterality Date     COLONOSCOPY N/A 9/18/2019    Procedure: COLONOSCOPY;  Surgeon: Jeremi Banks MD;  Location: [facility]      ESOPHAGOSCOPY, GASTROSCOPY, DUODENOSCOPY (EGD), COMBINED N/A 9/18/2019    Procedure: Upper endoscopy and colonoscopy with biopsy;  Surgeon: Jeremi Banks MD;  Location: [facility]      insulin aspart (NOVOLOG PENFILL) 100 UNIT/ML cartridge  insulin glargine (BASAGLAR KWIKPEN) 100 UNIT/ML  pen  acetone, Urine, test STRP  blood glucose monitoring (ACCU-CHEK FASTCLIX) lancets  blood glucose monitoring (ACCU-CHEK HENRI SMARTVIEW) meter device kit  blood glucose monitoring (ACCU-CHEK SMARTVIEW) test strip  famotidine (PEPCID) 20 MG tablet  insulin pen needle (BD HENRI U/F) 32G X 4 MM  ondansetron (ZOFRAN ODT) 4 MG ODT tab      No Known Allergies  Family History  Family History   Problem Relation Age of Onset     Diabetes No family hx of         type 1 diabetes or autoimmunity     Social History   Social History     Tobacco Use     Smoking status: Never Smoker     Smokeless tobacco: Never Used   Substance Use Topics     Alcohol use: Not Currently     Drug use: No      Past medical history, past surgical history, medications, allergies, family history, and social history were reviewed with the patient. No additional pertinent items.       Review of Systems   Constitutional: Positive for appetite change. Negative for fever.   Respiratory: Negative for cough and shortness of breath.    Cardiovascular: Negative for chest pain.   Gastrointestinal: Positive for diarrhea, nausea and vomiting. Negative for abdominal pain and constipation.   Genitourinary: Positive for decreased urine volume. Negative for dysuria, pelvic pain, vaginal bleeding and vaginal discharge.   All other systems reviewed and are negative.    A complete review of systems was performed with pertinent positives and negatives noted in the HPI, and all other systems negative.    Physical Exam   BP: 138/84  Pulse: 91  Temp: 98.4  F (36.9  C)  Resp: 16  Weight: 57.6 kg (126 lb 14.4 oz)  SpO2: 99 %  Physical Exam  Vitals and nursing note reviewed.   Constitutional:       General: She is not in acute distress.     Appearance: She is well-developed. She is ill-appearing. She is not toxic-appearing or diaphoretic.   HENT:      Head: Normocephalic and atraumatic.      Nose: Nose normal.      Mouth/Throat:      Mouth: Mucous membranes are dry.   Eyes:       General: No scleral icterus.     Conjunctiva/sclera: Conjunctivae normal.   Cardiovascular:      Rate and Rhythm: Normal rate.   Pulmonary:      Effort: Pulmonary effort is normal. No respiratory distress.      Breath sounds: No stridor.   Abdominal:      General: There is no distension.      Palpations: Abdomen is soft. There is no mass.      Tenderness: There is generalized abdominal tenderness. There is guarding. There is no rebound.   Musculoskeletal:         General: No deformity or signs of injury. Normal range of motion.      Cervical back: Normal range of motion and neck supple. No rigidity.   Skin:     General: Skin is warm and dry.      Coloration: Skin is not jaundiced or pale.      Findings: No rash.   Neurological:      General: No focal deficit present.      Mental Status: She is alert and oriented to person, place, and time.   Psychiatric:         Mood and Affect: Mood normal.         Behavior: Behavior normal.         Thought Content: Thought content normal.         ED Course      Procedures                     Results for orders placed or performed during the hospital encounter of 01/01/22   US OB < 14 Weeks Single     Status: None    Narrative    EXAM: US OB < 14 WEEKS SINGLE-TRANSABDOMINAL  LOCATION: [facility]  DATE/TIME: 1/1/2022 9:46 PM    INDICATION: abd pain, hyperemesis, confirm IUP  COMPARISON: None.  TECHNIQUE: Transabdominal scans were performed. Endovaginal ultrasound was performed to better visualize the embryo.    FINDINGS:  UTERUS: Single normal appearing intrauterine gestation sac.  CRL: Measures 1.9 cm, equals 8 weeks 4 days.  FETAL HEART RATE: 149 bpm.  AMNIOTIC FLUID: Normal.  PLACENTA: Not yet formed. Tiny region of subchorionic hemorrhage adjacent to the sac measuring 1.3 x 0.4 cm.    RIGHT OVARY: Normal.  LEFT OVARY: Normal.      Impression    IMPRESSION:   1.  Single living intrauterine gestation at 8 weeks 4 days, EDC  08/09/2022    2.  Tiny amount of subchorionic hemorrhage adjacent to the sac..       Glucose by meter     Status: Normal   Result Value Ref Range    GLUCOSE BY METER POCT 82 70 - 99 mg/dL   Comprehensive metabolic panel     Status: Normal   Result Value Ref Range    Sodium 138 133 - 144 mmol/L    Potassium 3.7 3.4 - 5.3 mmol/L    Chloride 107 96 - 110 mmol/L    Carbon Dioxide (CO2) 22 20 - 32 mmol/L    Anion Gap 9 3 - 14 mmol/L    Urea Nitrogen 9 7 - 30 mg/dL    Creatinine 0.55 0.50 - 1.00 mg/dL    Calcium 9.1 8.5 - 10.1 mg/dL    Glucose 98 70 - 99 mg/dL    Alkaline Phosphatase 98 40 - 150 U/L    AST 11 0 - 35 U/L    ALT 13 0 - 50 U/L    Protein Total 7.7 6.8 - 8.8 g/dL    Albumin 3.4 3.4 - 5.0 g/dL    Bilirubin Total 0.5 0.2 - 1.3 mg/dL    GFR Estimate >90 >60 mL/min/1.73m2   Lipase     Status: Normal   Result Value Ref Range    Lipase 75 73 - 393 U/L   UA with Microscopic reflex to Culture     Status: Abnormal    Specimen: Urine, Midstream   Result Value Ref Range    Color Urine Light Yellow Colorless, Straw, Light Yellow, Yellow    Appearance Urine Slightly Cloudy (A) Clear    Glucose Urine Negative Negative mg/dL    Bilirubin Urine Negative Negative    Ketones Urine 100  (A) Negative mg/dL    Specific Gravity Urine 1.019 1.003 - 1.035    Blood Urine Negative Negative    pH Urine 6.5 5.0 - 7.0    Protein Albumin Urine 70  (A) Negative mg/dL    Urobilinogen Urine Normal Normal, 2.0 mg/dL    Nitrite Urine Negative Negative    Leukocyte Esterase Urine Trace (A) Negative    Mucus Urine Present (A) None Seen /LPF    RBC Urine 1 <=2 /HPF    WBC Urine 1 <=5 /HPF    Squamous Epithelials Urine 17 (H) <=1 /HPF    Transitional Epithelials Urine 1 <=1 /HPF    Narrative    Urine Culture not indicated   HCG qualitative urine (UPT)     Status: Abnormal   Result Value Ref Range    hCG Urine Qualitative Positive (A) Negative   CBC with platelets and differential     Status: Abnormal   Result Value Ref Range    WBC Count 7.7 4.0 -  11.0 10e3/uL    RBC Count 4.16 3.80 - 5.20 10e6/uL    Hemoglobin 11.6 (L) 11.7 - 15.7 g/dL    Hematocrit 34.3 (L) 35.0 - 47.0 %    MCV 83 78 - 100 fL    MCH 27.9 26.5 - 33.0 pg    MCHC 33.8 31.5 - 36.5 g/dL    RDW 13.8 10.0 - 15.0 %    Platelet Count 301 150 - 450 10e3/uL    % Neutrophils 62 %    % Lymphocytes 33 %    % Monocytes 5 %    % Eosinophils 0 %    % Basophils 0 %    % Immature Granulocytes 0 %    NRBCs per 100 WBC 0 <1 /100    Absolute Neutrophils 4.7 1.6 - 8.3 10e3/uL    Absolute Lymphocytes 2.6 0.8 - 5.3 10e3/uL    Absolute Monocytes 0.4 0.0 - 1.3 10e3/uL    Absolute Eosinophils 0.0 0.0 - 0.7 10e3/uL    Absolute Basophils 0.0 0.0 - 0.2 10e3/uL    Absolute Immature Granulocytes 0.0 <=0.4 10e3/uL    Absolute NRBCs 0.0 10e3/uL   iStat Gases (lactate) venous, POCT     Status: Abnormal   Result Value Ref Range    Lactic Acid POCT 0.6 <=2.0 mmol/L    Bicarbonate Venous POCT 20 (L) 21 - 28 mmol/L    O2 Sat, Venous POCT 90 (L) 94 - 100 %    pCO2V Venous POCT 32 (L) 40 - 50 mm Hg    pH Venous POCT 7.41 7.32 - 7.43    pO2 Venous POCT 56 (H) 25 - 47 mm Hg   Magnesium     Status: Normal   Result Value Ref Range    Magnesium 1.6 1.6 - 2.3 mg/dL   TSH with free T4 reflex     Status: Normal   Result Value Ref Range    TSH 0.67 0.40 - 4.00 mU/L   CBC with platelets differential     Status: Abnormal    Narrative    The following orders were created for panel order CBC with platelets differential.  Procedure                               Abnormality         Status                     ---------                               -----------         ------                     CBC with platelets and d...[[id number]]  Abnormal            Final result                 Please view results for these tests on the individual orders.     Medications   0.9% sodium chloride BOLUS (0 mLs Intravenous Stopped 1/1/22 3833)     Followed by   sodium chloride 0.9% infusion (has no administration in time range)   0.9% sodium chloride BOLUS (1,000  mLs Intravenous New Bag 1/1/22 2249)   prenatal multivitamin w/iron per tablet 1 tablet (has no administration in time range)   dextrose 5% and 0.9% NaCl infusion (has no administration in time range)   metoclopramide (REGLAN) injection 10 mg (10 mg Intravenous Given 1/1/22 2039)   pyridOXINE (VITAMIN B6) tablet 25 mg (25 mg Oral Given 1/1/22 2245)   doxylamine (UNISOM) tablet 25 mg (25 mg Oral Given 1/1/22 2245)        Assessments & Plan (with Medical Decision Making)   Myriam Wylie is a 21 year old female who is 9 weeks pregnant and has history of type 1 diabetes, DKA, C. difficile, and pelvic inflammatory disease, who presents to the Emergency Department for vomiting and hypoglycemia.     Ddx: Hyperemesis gravidarum, influenza, COVID-19, C. difficile colitis, bacterial colitis, type 1 diabetes, erratic blood sugar    Patient with first-time pregnancy in the first trimester without previous confirmatory ultrasound.  UPT positive.  Ordered a transvaginal ultrasound to rule out ectopic as patient does have some abdominal tenderness.  Her abdominal exam is most consistent with rectus muscle tenderness from frequent retching as well as some cramping related to her frequent diarrhea.  She has had low blood sugars because she has been unable to tolerate oral intake and keeping her same insulin regimen.      Patient given Reglan, pyridoxine, Unisom, prenatal vitamin, IV fluids.  Creatinine normal.  Glucose 98, normal bicarb and anion gap.  Normal sodium and potassium.  LFTs and bilirubin normal.  Normal lipase.  Will TSH and magnesium.  Analysis does not appear to be consistent with an infection.  Lactate normal.  pH normal.  White count normal.  Hemoglobin within normal limits for pregnancy.  Transvaginal ultrasound shows a single live intrauterine gestation at 8 weeks and 4 days.  Tiny amount of subchorionic hemorrhage adjacent to the sac.  We will add on a type and screen to evaluate Rh status.  Patient started on  a low rate D5 normal saline drip to reverse ketosis seen on urinalysis and prevent hypoglycemia in the setting of poor p.o. intake and long-acting insulin.      Patient recently had DKA so I hesitate to decrease her insulin regimen.  I think she would benefit from an obs admission to orally rehydrate her and get her symptoms under better control before discharge.  I have reviewed the nursing notes. I have reviewed the findings, diagnosis, plan and need for follow up with the patient.  Provided to [facility] who will manage her on observation.    New Prescriptions    No medications on file       Final diagnoses:   Hyperemesis gravidarum   Pre-existing type 1 diabetes mellitus during pregnancy in first trimester     I, Giorgi Katz, am serving as a trained medical scribe to document services personally performed by Ayana Bowie MD, based on the provider's statements to me.   IAyana MD, was physically present and have reviewed and verified the accuracy of this note documented by Giorgi Katz.     --    [facility] EMERGENCY DEPARTMENT  1/1/2022     Ayana Bowie MD  01/01/22 [id number]       Ayana Bowie MD  01/01/22 [id number]

## 2022-01-02 NOTE — H&P
"[facility]    Internal Medicine History and Physical - Hospitalist Service, [facility]        Date of Admission: 1/1/2022    Assessment & Plan  Myriam Wylie is a 21 year old woman with a history of DM I, DKA, and C. difficile (2016) who is 8 wks 4 days pregnant and presented to ED w/ N/V. Registering to observation for further management.     #Pregnancy. This is her 1st pregnancy. Had + home test and is 9 wks 2 days based on LMP, per prelim US report 8 wks 4 days.   - Final OB US read pending.   - Prenatal vitamin.   - Has appointment to establish OB care through [facility] on 2/9/22.     #Nausea, Vomiting, Loose Stools. Has been nauseous over several weeks of pregnancy but vomiting and loose stools are new since yesterday. No abdominal pain or  sx. Afebrile. No leukocytosis. Is unvaccinated for influenza and covid. Differential to include N/V related to 1st trimester pregnancy, hyperemesis gravidarum, +/- infectious gastroenteritis (including covid-19 in differential). LFTs wnl.   - Awaiting flu and covid rapid.   - ED has ordered stool MARIZA and C diff PCR.   - Schedule vitamin B6 and doxylamine.   - PRN antiemetics.   - Offer influenza and covid vaccines at discharge.     #DM I. HgbA1c 8.7% w/ prior hx of DKA (as recently as early December in setting of what she thought was food poisoning) but no evidence for this here. BG 82-98 in ED. Followed by [facility] endocrinology. Home regimen Lantus 15 units every afternoon, Novolog 1 unit per 10 g CHO, and correction scale 1:50. Has been too tightly controlled at home.   - For tonight, Q4hr BG checks w/ \"medium\" correction scale if needed.   - Already took home Lantus 15 units this afternoon. No long acting or carb coverage insulin has been ordered here; need to readdress tomorrow pending BG trend. Endocrinology consult has been placed.  - Has endocrinology f/u on 2/21 but this may need to be moved up pending " endocrine reccs here and different needs during pregnancy.    Clinically Significant Risk Factors Present on Admission                      Diet: advance as tolerated   Fluids: NS @ 125 ml/hr  DVT Prophylaxis: ambulate  Code Status: full    Disposition Plan   Expected discharge: Tomorrow; recommended to prior living arrangement once N/V controlled and tolerating PO intake     Tiny Rea PA-C  Hospitalist Service, [facility]   Contact information available via [facility] Paging/Directory  Please see sign in/sign out for up to date coverage information    Chief Complaint   Vomiting     History is obtained from the patient and mother (present at bedside)     History of Present Illness   Patient presented to ED for evaluation of N/V. She is estimated to be 8-9 weeks pregnant by home test and LMP. She has not yet had any prenatal care but has an appointment for 2/9 at [facility]. She has been nauseous thus far during the pregnancy for a couple weeks worsening over a couple days, but vomiting did not start until yesterday and has occurred non bloody x 20 times. No abdominal pain or urinary complaints. No vaginal bleeding or discharge. Is also having watery diarrhea since yesterday x 5, non bloody. No f/c. No sick contacts; mostly isolating at home. Blood sugar has been tightly controlled at home with poor oral intake. Last took Lantus 15 units this afternoon. Monogamous partner. Feels safe at home.     Review of Systems   10 point ROS performed and negative unless otherwise noted in HPI    Past Medical History    I have reviewed this patient's medical history and updated it with pertinent information if needed.   Past Medical History:   Diagnosis Date     C. difficile colitis      Diabetes type 1, uncontrolled (H)      DKA (diabetic ketoacidosis) (H)      PID (acute pelvic inflammatory disease)         Past Surgical History   I have reviewed this patient's surgical  history and updated it with pertinent information if needed.  Past Surgical History:   Procedure Laterality Date     COLONOSCOPY N/A 2019    Procedure: COLONOSCOPY;  Surgeon: Jeremi Banks MD;  Location: [facility]      ESOPHAGOSCOPY, GASTROSCOPY, DUODENOSCOPY (EGD), COMBINED N/A 2019    Procedure: Upper endoscopy and colonoscopy with biopsy;  Surgeon: Jeremi Banks MD;  Location: [facility]         Social History   Social History     Tobacco Use     Smoking status: Never Smoker     Smokeless tobacco: Never Used   Substance Use Topics     Alcohol use: Not Currently     Drug use: No     Lives at home with mother and boyfriend.     Family History   I have reviewed this patient's family history and updated it with pertinent information if needed.   Family History   Problem Relation Age of Onset     Diabetes No family hx of         type 1 diabetes or autoimmunity       Prior to Admission Medications   Prior to Admission Medications   Prescriptions Last Dose Informant Patient Reported? Taking?   acetone, Urine, test STRP   No No   Si strip by In Vitro route as needed   blood glucose monitoring (ACCU-CHEK FASTCLIX) lancets   No No   Sig: Use to test blood sugar 6 times daily or as directed.   blood glucose monitoring (ACCU-CHEK HENRI SMARTVIEW) meter device kit   No No   Sig: Use to test blood sugar 6 times daily or as directed.   blood glucose monitoring (ACCU-CHEK SMARTVIEW) test strip   No No   Sig: Use to test blood sugar 6 times daily or as directed.   famotidine (PEPCID) 20 MG tablet   No No   Sig: Take 1 tablet (20 mg) by mouth 2 times daily   insulin aspart (NOVOLOG PENFILL) 100 UNIT/ML cartridge 2022 at Unknown time  No Yes   Si unit per 10 grams of carbohydrate and 1 unit per 50 over 150 for correction before meals and at bedtime.   insulin glargine (BASAGLAR KWIKPEN) 100 UNIT/ML pen 2022 at Unknown time  No Yes   Sig: Inject 15 Units Subcutaneous daily At noon   insulin pen  needle (BD HENRI U/F) 32G X 4 MM   No No   Sig: Use 6 pen needles daily or as directed.   ondansetron (ZOFRAN ODT) 4 MG ODT tab   No No   Sig: Take 1-2 tablets (4-8 mg) by mouth every 8 hours as needed for nausea      Facility-Administered Medications: None     Allergies   No Known Allergies    Physical Exam   /84   Pulse 91   Temp 98.4  F (36.9  C)   Resp 16   Wt 57.6 kg (126 lb 14.4 oz)   LMP  (LMP Unknown)   SpO2 99%   BMI 24.78 kg/m    GENERAL: Alert and oriented x 3. Lying in bed, appears comfortable. Conversant.   HEENT: Anicteric sclera. Mucous membranes moist.   CV: RRR. S1, S2. No murmurs appreciated.   RESPIRATORY: Effort normal on room air. Lungs CTAB with no wheezing, rales, rhonchi.   GI: Abdomen soft, non distended, non tender.   NEUROLOGICAL: No focal deficits. Moves all extremities.   EXTREMITIES: No peripheral edema. Warm and well perfused.   SKIN: No jaundice. No rashes.     Data   Data reviewed today: I reviewed all medications, new labs and imaging results over the last 24 hours.

## 2022-01-03 VITALS
BODY MASS INDEX: 25.88 KG/M2 | RESPIRATION RATE: 17 BRPM | TEMPERATURE: 98.2 F | OXYGEN SATURATION: 94 % | WEIGHT: 128.4 LBS | DIASTOLIC BLOOD PRESSURE: 69 MMHG | HEIGHT: 59 IN | SYSTOLIC BLOOD PRESSURE: 112 MMHG | HEART RATE: 88 BPM

## 2022-01-03 LAB
ALBUMIN SERPL-MCNC: 2.8 G/DL (ref 3.4–5)
ALP SERPL-CCNC: 90 U/L (ref 40–150)
ALT SERPL W P-5'-P-CCNC: 13 U/L (ref 0–50)
ANION GAP SERPL CALCULATED.3IONS-SCNC: 15 MMOL/L (ref 3–14)
AST SERPL W P-5'-P-CCNC: 9 U/L (ref 0–35)
BILIRUB SERPL-MCNC: 0.5 MG/DL (ref 0.2–1.3)
BUN SERPL-MCNC: 8 MG/DL (ref 7–30)
C COLI+JEJUNI+LARI FUSA STL QL NAA+PROBE: NOT DETECTED
C DIFF TOX B STL QL: NEGATIVE
CALCIUM SERPL-MCNC: 8.4 MG/DL (ref 8.5–10.1)
CHLORIDE BLD-SCNC: 106 MMOL/L (ref 96–110)
CO2 SERPL-SCNC: 11 MMOL/L (ref 20–32)
CREAT SERPL-MCNC: 0.6 MG/DL (ref 0.5–1)
EC STX1 GENE STL QL NAA+PROBE: NOT DETECTED
EC STX2 GENE STL QL NAA+PROBE: NOT DETECTED
ERYTHROCYTE [DISTWIDTH] IN BLOOD BY AUTOMATED COUNT: 13.9 % (ref 10–15)
GFR SERPL CREATININE-BSD FRML MDRD: >90 ML/MIN/1.73M2
GLUCOSE BLD-MCNC: 286 MG/DL (ref 70–99)
GLUCOSE BLDC GLUCOMTR-MCNC: 146 MG/DL (ref 70–99)
GLUCOSE BLDC GLUCOMTR-MCNC: 178 MG/DL (ref 70–99)
GLUCOSE BLDC GLUCOMTR-MCNC: 231 MG/DL (ref 70–99)
GLUCOSE BLDC GLUCOMTR-MCNC: 266 MG/DL (ref 70–99)
GLUCOSE BLDC GLUCOMTR-MCNC: 313 MG/DL (ref 70–99)
HCT VFR BLD AUTO: 34.9 % (ref 35–47)
HGB BLD-MCNC: 10.9 G/DL (ref 11.7–15.7)
MCH RBC QN AUTO: 27 PG (ref 26.5–33)
MCHC RBC AUTO-ENTMCNC: 31.2 G/DL (ref 31.5–36.5)
MCV RBC AUTO: 86 FL (ref 78–100)
NOROV GI+II ORF1-ORF2 JNC STL QL NAA+PR: NOT DETECTED
PLATELET # BLD AUTO: 255 10E3/UL (ref 150–450)
POTASSIUM BLD-SCNC: 4.3 MMOL/L (ref 3.4–5.3)
PROT SERPL-MCNC: 6.4 G/DL (ref 6.8–8.8)
RBC # BLD AUTO: 4.04 10E6/UL (ref 3.8–5.2)
RVA NSP5 STL QL NAA+PROBE: NOT DETECTED
SALMONELLA SP RPOD STL QL NAA+PROBE: NOT DETECTED
SHIGELLA SP+EIEC IPAH STL QL NAA+PROBE: NOT DETECTED
SODIUM SERPL-SCNC: 132 MMOL/L (ref 133–144)
V CHOL+PARA RFBL+TRKH+TNAA STL QL NAA+PR: NOT DETECTED
WBC # BLD AUTO: 8.7 10E3/UL (ref 4–11)
Y ENTERO RECN STL QL NAA+PROBE: NOT DETECTED

## 2022-01-03 PROCEDURE — 258N000003 HC RX IP 258 OP 636: Performed by: PHYSICIAN ASSISTANT

## 2022-01-03 PROCEDURE — 82040 ASSAY OF SERUM ALBUMIN: CPT | Performed by: INTERNAL MEDICINE

## 2022-01-03 PROCEDURE — 80053 COMPREHEN METABOLIC PANEL: CPT | Performed by: INTERNAL MEDICINE

## 2022-01-03 PROCEDURE — 99233 SBSQ HOSP IP/OBS HIGH 50: CPT | Performed by: NURSE PRACTITIONER

## 2022-01-03 PROCEDURE — 250N000012 HC RX MED GY IP 250 OP 636 PS 637: Performed by: NURSE PRACTITIONER

## 2022-01-03 PROCEDURE — 250N000013 HC RX MED GY IP 250 OP 250 PS 637: Performed by: PHYSICIAN ASSISTANT

## 2022-01-03 PROCEDURE — 36415 COLL VENOUS BLD VENIPUNCTURE: CPT | Performed by: INTERNAL MEDICINE

## 2022-01-03 PROCEDURE — 99239 HOSP IP/OBS DSCHRG MGMT >30: CPT | Performed by: INTERNAL MEDICINE

## 2022-01-03 PROCEDURE — 250N000011 HC RX IP 250 OP 636: Performed by: PHYSICIAN ASSISTANT

## 2022-01-03 PROCEDURE — 85027 COMPLETE CBC AUTOMATED: CPT | Performed by: INTERNAL MEDICINE

## 2022-01-03 RX ORDER — ONDANSETRON 4 MG/1
4 TABLET, FILM COATED ORAL EVERY 8 HOURS PRN
Qty: 30 TABLET | Refills: 0 | Status: ON HOLD | OUTPATIENT
Start: 2022-01-03 | End: 2022-07-27

## 2022-01-03 RX ORDER — PRENATAL VIT/IRON FUM/FOLIC AC 27MG-0.8MG
1 TABLET ORAL DAILY
Qty: 90 TABLET | Refills: 3 | Status: SHIPPED | OUTPATIENT
Start: 2022-01-04 | End: 2023-04-10

## 2022-01-03 RX ORDER — PYRIDOXINE HCL (VITAMIN B6) 25 MG
25 TABLET ORAL 3 TIMES DAILY
Qty: 90 TABLET | Refills: 0 | Status: SHIPPED | OUTPATIENT
Start: 2022-01-03 | End: 2023-02-18

## 2022-01-03 RX ORDER — INSULIN GLARGINE 100 [IU]/ML
14 INJECTION, SOLUTION SUBCUTANEOUS DAILY
Qty: 15 ML | Refills: 0 | Status: SHIPPED | OUTPATIENT
Start: 2022-01-03

## 2022-01-03 RX ADMIN — SODIUM CHLORIDE: 9 INJECTION, SOLUTION INTRAVENOUS at 00:56

## 2022-01-03 RX ADMIN — SODIUM CHLORIDE: 9 INJECTION, SOLUTION INTRAVENOUS at 09:30

## 2022-01-03 RX ADMIN — PRENATAL VITAMINS-IRON FUMARATE 27 MG IRON-FOLIC ACID 0.8 MG TABLET 1 TABLET: at 09:29

## 2022-01-03 RX ADMIN — FAMOTIDINE 20 MG: 20 TABLET ORAL at 09:28

## 2022-01-03 RX ADMIN — INSULIN ASPART 4 UNITS: 100 INJECTION, SOLUTION INTRAVENOUS; SUBCUTANEOUS at 12:55

## 2022-01-03 RX ADMIN — Medication 25 MG: at 09:29

## 2022-01-03 RX ADMIN — ONDANSETRON 4 MG: 2 INJECTION INTRAMUSCULAR; INTRAVENOUS at 04:38

## 2022-01-03 ASSESSMENT — ACTIVITIES OF DAILY LIVING (ADL)
ADLS_ACUITY_SCORE: 3

## 2022-01-03 NOTE — DISCHARGE SUMMARY
[facility]  Hospitalist Discharge Summary      Date of Admission:  1/1/2022  Date of Discharge:  1/3/2022  Discharging Provider: Marlo Sullivan MD      Discharge Diagnoses   # Intractable Nausea, Vomiting, Loose Stools  #Pregnancy, 1st trimester  #DM type 1, Uncontrolled     Follow-ups Needed After Discharge   Follow-up Appointments     Adult [facility] Follow-up and recommended labs and tests      Follow up with   Dr. Puckett, adult endocrinologist,     [facility]  [address], [address], [place], [place],  Phone [phone]  Appointmet: Thursday January 6 at 8:30am  to evaluate treatment change.                 Discharge Disposition   Discharged to home  Condition at discharge: Stable      Hospital Course   Myriam Wylie is a 21 year old woman with a history of DM I, DKA, and C. difficile (2016) who is 8 wks 4 days pregnant and presented to ED  with intractable nausea and vomiting .  She is being admitted for continued management of nausea & vomiting, IV fluid replenishment, and stabilization of blood sugars      # Intractable Nausea, Vomiting, Loose Stools. Has been nauseous over several weeks of pregnancy but vomiting and loose stools are new since yesterday. No abdominal pain or  sx. Afebrile. No leukocytosis. Is unvaccinated for influenza and covid. Differential to include N/V related to 1st trimester pregnancy, hyperemesis gravidarum, +/- infectious gastroenteritis (including covid-19 in differential). LFTs wnl.   Continue IV fluids for now   - Influenza and COVID 19 negative   -  stool MARIZA  (negative)and C diff PCR ( negative)  - Schedule vitamin B6   - PRN antiemetics. Zofran ordered at discharge   - Offer influenza and covid vaccines at discharge.            #Pregnancy. This is her 1st pregnancy. Had + home test and is 9 wks 2 days based on LMP, per prelim US report 8 wks 4 days.   - Final OB  US read pending.   - Prenatal vitamin.   - Has appointment to establish OB care through [facility] the [facility] on 2/9/22.          #DM I, Uncontrolled . HgbA1c 8.7% w/ prior hx of DKA (as recently as early December in setting of what she thought was food poisoning) but no evidence for this here. BG 82-98 in ED. Followed by [facility] endocrinology. Home regimen Lantus 15 units every afternoon, Novolog 1 unit per 10 g CHO, and correction scale 1:50. Has been too tightly controlled at home.   Clarice was evaluated by our endocrinology team with the following recommendations:  Resume home dose of Lantus 14-15 unit(s) daily - as discussed,  can increase by unit(s) each day if blood sugars are consistently greater than 150.     Novolog 1 unit(s) per 10 grams of carbohydrate  Moderate intensity sliding scale insulin ( regimen included in discharge orders)      -  Education:  Will need outline for pregnancy targets for BG, not clear if additional Ed will be needed    -  Outpatient follow up: KEVIN Hamilton MD - next appointment scheduled for 2/22/2022 - will recommend sooner - 7-10 days following discharge.   IM glucagon prescribed at discharge      Consultations This Hospital Stay   ENDOCRINE DIABETES ADULT IP CONSULT    Code Status   Full Code    Time Spent on this Encounter   I, Marlo Sullivan MD, personally saw the patient today and spent greater than 30 minutes discharging this patient.       Marlo Sullivan MD  [facility] MED SURG ORTHOPEDIC  [address] [address]  Phone: [phone]  Fax: [phone]  ______________________________________________________________________    Physical Exam   Vital Signs: Temp: 98.2  F (36.8  C) Temp src: Oral BP: 112/69 Pulse: 88   Resp: 17 SpO2: 94 % O2 Device: None (Room air)    Weight: 128 lbs 6.4 oz  General Appearance: Awake, alert and not in distress  Respiratory: Clear breath sounds bilaterally   Cardiovascular:  Normal heart sounds. No murmurs   GI: Soft, non tender. Normal bowel sounds   Skin: No bruising or bleeding   MSK: No swelling or joint pain noted in both upper and lower extremities   Other:Awake, alert and orientated X 3          Primary Care Physician   Physician No Ref-Primary    Discharge Orders      Reason for your hospital stay    Nausea, vomiting and diarrhea     Activity    Your activity upon discharge: activity as tolerated     Adult [facility] Follow-up and recommended labs and tests    Follow up with   Dr. Puckett, adult endocrinologist,     [facility]  [address] [facility], [address], [place], [place],  Phone [phone]  Appointmet: Thursday January 6 at 8:30am  to evaluate treatment change.     Diet    Follow this diet upon discharge: Orders Placed This Encounter      Advance Diet as Tolerated: Regular Diet Adult       Significant Results and Procedures   Results for orders placed or performed during the hospital encounter of 01/01/22   US OB < 14 Weeks Single    Narrative    EXAM: US OB < 14 WEEKS SINGLE-TRANSABDOMINAL  LOCATION: [facility]  DATE/TIME: 1/1/2022 9:46 PM    INDICATION: abd pain, hyperemesis, confirm IUP  COMPARISON: None.  TECHNIQUE: Transabdominal scans were performed. Endovaginal ultrasound was performed to better visualize the embryo.    FINDINGS:  UTERUS: Single normal appearing intrauterine gestation sac.  CRL: Measures 1.9 cm, equals 8 weeks 4 days.  FETAL HEART RATE: 149 bpm.  AMNIOTIC FLUID: Normal.  PLACENTA: Not yet formed. Tiny region of subchorionic hemorrhage adjacent to the sac measuring 1.3 x 0.4 cm.    RIGHT OVARY: Normal.  LEFT OVARY: Normal.      Impression    IMPRESSION:   1.  Single living intrauterine gestation at 8 weeks 4 days, EDC 08/09/2022    2.  Tiny amount of subchorionic hemorrhage adjacent to the sac..             Discharge Medications   Current Discharge Medication List       START taking these medications    Details   Glucagon 0.5 MG/0.1ML SOSY Inject 1 ampule Subcutaneous daily as needed  Qty: 1 mL, Refills: 0    Associated Diagnoses: Type 1 diabetes mellitus with hyperglycemia (H)      !! insulin aspart (NOVOLOG PEN) 100 UNIT/ML pen 1 unit / 10 gms of carbs with meals and snacks  Qty: 15 mL    Associated Diagnoses: Type 1 diabetes mellitus with hyperglycemia (H)      !! insulin aspart (NOVOLOG PEN) 100 UNIT/ML pen Inject 1-5 Units Subcutaneous 4 times daily  Qty: 15 mL    Comments: ISF 50  1 per 50 > 100 - before meals      For  - 150 give 1 unit.   For - 200 give 2 units.   For - 250 give 3 units.   For  - 300 give 4 units.   For BG = or > 301 give 5 units.     Associated Diagnoses: Type 1 diabetes mellitus with hyperglycemia (H)      !! insulin aspart (NOVOLOG PEN) 100 UNIT/ML pen Inject 1-5 Units Subcutaneous At Bedtime  Qty: 15 mL, Refills: 0    Comments: ISF 50  1 per 50 > 120 - before bed  For  - 169 give 1 unit.   For  - 219 give 2 units.   For  - 269 give 3 units.   For  - 319 give 4 units.   For BG = or > 320 give 5 units.        Associated Diagnoses: Type 1 diabetes mellitus with hyperglycemia (H)      ondansetron (ZOFRAN) 4 MG tablet Take 1 tablet (4 mg) by mouth every 8 hours as needed for nausea  Qty: 30 tablet, Refills: 0    Associated Diagnoses: Nausea and vomiting, intractability of vomiting not specified, unspecified vomiting type      Prenatal Vit-Fe Fumarate-FA (PRENATAL MULTIVITAMIN W/IRON) 27-0.8 MG tablet Take 1 tablet by mouth daily  Qty: 90 tablet, Refills: 3    Associated Diagnoses: Hyperemesis gravidarum      pyridOXINE (VITAMIN B6) 25 MG tablet Take 1 tablet (25 mg) by mouth 3 times daily  Qty: 90 tablet, Refills: 0    Associated Diagnoses: Hyperemesis gravidarum       !! - Potential duplicate medications found. Please discuss with provider.      CONTINUE these medications which have CHANGED    Details    insulin glargine (BASAGLAR KWIKPEN) 100 UNIT/ML pen Inject 14 Units Subcutaneous daily At noon  Qty: 15 mL, Refills: 0    Comments: If Basaglar is not covered by insurance, may substitute Lantus at same dose and frequency.    Associated Diagnoses: Type 1 diabetes mellitus with hyperglycemia (H)         CONTINUE these medications which have NOT CHANGED    Details   acetone, Urine, test STRP 1 strip by In Vitro route as needed  Qty: 50 each, Refills: 1      blood glucose monitoring (ACCU-CHEK FASTCLIX) lancets Use to test blood sugar 6 times daily or as directed.  Qty: 2 Box, Refills: 6    Associated Diagnoses: Type 1 diabetes mellitus with hyperglycemia (H)      blood glucose monitoring (ACCU-CHEK HENRI SMARTVIEW) meter device kit Use to test blood sugar 6 times daily or as directed.  Qty: 2 kit, Refills: 6    Comments: 1 kit home and 1 kit school  Associated Diagnoses: Type 1 diabetes mellitus with hyperglycemia (H)      blood glucose monitoring (ACCU-CHEK SMARTVIEW) test strip Use to test blood sugar 6 times daily or as directed.  Qty: 200 each, Refills: 6    Associated Diagnoses: Type 1 diabetes mellitus with hyperglycemia (H)      famotidine (PEPCID) 20 MG tablet Take 1 tablet (20 mg) by mouth 2 times daily  Qty: 28 tablet, Refills: 0    Associated Diagnoses: Acute gastritis without hemorrhage, unspecified gastritis type      insulin pen needle (BD HENRI U/F) 32G X 4 MM Use 6 pen needles daily or as directed.  Qty: 200 each, Refills: 6    Associated Diagnoses: Type 1 diabetes mellitus with hyperglycemia (H)         STOP taking these medications       insulin aspart (NOVOLOG PENFILL) 100 UNIT/ML cartridge Comments:   Reason for Stopping:         ondansetron (ZOFRAN ODT) 4 MG ODT tab Comments:   Reason for Stopping:             Allergies   No Known Allergies

## 2022-01-03 NOTE — PROGRESS NOTES
Care Management Follow Up    Length of Stay (days): 1    Additional Information:  Follow up appointment scheduled with Dr. Puckett, Adult Endocrinologist. Thursday, January 6 at 8:30am. Patient notified and agreed to appointment. RNCC available as needed.    [facility]  [address], [address]  [facility]  Phone [phone]      Kimberlee Parsons RN, BSN  Care Coordinator, [address] [place] [address]  Phone [phone]  Pager [phone]

## 2022-01-03 NOTE — PLAN OF CARE
Pt A/O X 4. Afebrile. VSS. Lungs-Clear bilaterally with both anterior and posterior. Bowels-Hyperactive in all four quadrants, last BM 1-2-22. Voids spontaneously without difficulty in the bathroom. Denies nausea and vomiting. CMS and Neuro's are intact. Denies numbness and tingling in all extremities. Denies pain. Is on a Regular diet and appetite was Good this shift. Blood glucose  levels 266, 313, 178, Sliding scale and Carb Count insulin given. Pt also given scheduled Lantus. Bruises and scabs to the BLE's. Pt up in room independently. PIV removed from the right arm for discharge. Bilateral heels are elevated off the bed. Pt is able to make needs known, and call light is within reach. Pt. discharged at 14:00PM to home, and left with personal belongings. Pt. received complete discharge paperwork and PO medications as filled by discharge pharmacy. Pt. was given times of last dose for all discharge medications in writing on discharge medication sheets. Discharge teaching included PO and injectable medications, and sliding scale. Pt. had no further questions at the time of discharge and no unmet needs were identified.

## 2022-01-03 NOTE — PLAN OF CARE
"  VS: /82 (BP Location: Left arm, Patient Position: Supine)   Pulse 85   Temp 98.1  F (36.7  C) (Oral)   Resp 15   Ht 1.499 m (4' 11\")   Wt 58.2 kg (128 lb 6.4 oz)   LMP  (LMP Unknown)   SpO2 100%   BMI 25.93 kg/m     O2: SpO2 > 95% on RA. LS clear and equal bilaterally. Denies SOB and chest pain.   Output: Voids spontaneously without difficulty   Last BM: 1/2/22, small loose stool x2. C. Diff specimen collected and sent to lab. Results came back negative.    Activity: Up ad candida, independent.    Skin: WDL except bruises and scabs on bilateral knees/shins.   Pain: Denies. Pt reports slight abdominal discomfort.    CMS: Intact   Dressing: PIV dressing CDI   Diet: Advance as tolerated. Pt reported nausea, PRN Zofran given @ 0438. No vomiting this shift.  TDM1 - BG checks q4. Carb coverage and sliding scale.    LDA: R PIV infusing NS @ 125 mL/hr   Equipment: IV pole, personal belongings   Plan: TBD. Continue with plan of care, call light in reach.    Additional Info: 8 weeks, 5 days pregnant.   BG @ 0634 was 231      "

## 2022-01-03 NOTE — PROGRESS NOTES
Diabetes Consult Daily  Progress Note          Assessment/Plan:     HPI:  Myriam Wylie is a 21 year old woman with a history of DM I, DKA, and C. difficile (2016) who is 8 wks 4 days pregnant and presented to ED  with intractable nausea and vomiting .  She is being admitted for continued management of nausea & vomiting, IV fluid replenishment, and stabilization of blood sugars.    Assessment:     1)  Type 1 Diabetes Mellitus; historically suboptimal control.  A1c 8.7%. No known diabetic complications. (anti-CHERI found in EPIC)  2)  Hyperemesis gravidarum; improved  3)  Loose stools; improved       Plan:       -  Increase Lantus 12 unit(s) AM - will continue to adjust as PO changes    -  Novolog 1 per 10 g cho TID AC/snacks    -  Novolog medium resistance sliding scale insulin q4h - adjusted for pregnancy 1:50>100 and 1:50>120    -  BG monitoring q4h, add 2 hr PP for when PO resumes    -  Hypoglycemia protocol    -  Carb counting protocol    -  Education:  Will need outline for pregnancy targets for BG, not clear if additional Ed will be needed    -  Outpatient follow up: KEVIN Hamilton MD - next appointment scheduled for 2/22/2022 - will recommend sooner - 7-10 days following discharge.      -  Will sign off - if patient does not discharge, please contact IDS so we can continue to follow    Recommendations for Discharge:   Instructions to patient were posted in AVS and discussed on day of discharge.   Medications and supplies are to be ordered by primary service on discharge.   *please use the DIAB non-branded discharge supply order set ([id number])*       The following scales may be copied and pasted into discharge medication orders, without outnumbering character limit. A more detailed and user friendly scale was placed in discharge navigator for patient to review.     -follow up with Dr. Givens of  Endocrinology in 1-2 weeks after discharge - will send message on patient behalf     -upon  "discharge, patient will need the following supplies: Lantus Solostar pens, Novolog Flexpens, \"BD\" (32G x 4mm) insulin pen needles, glucometer, lancets, and test strips (if brand of meter not known, can be ordered \"no brand specified\" and note to pharmacy: \"can substitute per insurance coverage\"), sharps container, alcohol swabs.     Dexcom refills (if able)     Glucagon or intranasal glucose to treat hypoglycemia and unable to keep tablets/food/fluids 2/2 hyperemesis.      IP Diabetes Management Team Discharge Instructions - placed in discharge fantasma:    Glucose Control Regimen:     Resume home dose of Lantus 14-15 unit(s) daily - as discussed,  can increase by unit(s) each day if blood sugars are consistently greater than 150.      Novolog 1 unit(s) per 10 grams of carbohydrate    ISF 50  1 per 50 > 100 - before meals     For  - 150 give 1 unit.   For - 200 give 2 units.   For - 250 give 3 units.   For  - 300 give 4 units.   For BG = or > 301 give 5 units.    ISF 50  1 per 50 > 120 - before bed  For  - 169 give 1 unit.   For  - 219 give 2 units.   For  - 269 give 3 units.   For  - 319 give 4 units.   For BG = or > 320 give 5 units.      Blood Glucose Checks: three times daily before meals, and at bedtime AND please start 2 hour post prandial (after eating) blood sugar checks as we discussed and write them in a journal for your provider to review.     If able, it would be best to re-start Dexcom     Reminder - DO NOT correct these 2 hour post-prandial reads    Endocrinology Outpatient follow up:   Follow up with Padmini Givens MD - hopefully as soon as she is able to get your on the schedule.  Anticipate increased frequency for need of follow up visits.   Will send her a message today to update her on 1.  Pregnancy 2. Hospitalization and need for follow up appointment    Please call the clinic at [phone]  if you have non-urgent questions regarding your blood sugars or " insulin.     If you have urgent questions or concerns regarding your blood sugars or insulin, you may contact [phone] (the main hospital ). Ask to speak with the endocrinologist on call.    Management of Diabetes in pregnancy:    BG targets  Less than 95 fasting  Less than 140 one hour post-meal  Less than 120 two hours post-meal  Goal of 0 episodes of blood sugars less than 60     A1c goal in pregnancy: 6.5% or less    -TSH lab should be checked once every trimester    -Ophthalmologist visit at lease once in pregnancy (to detect changes in eyes related to pregnancy); twice during pregnancy if you have background diabetic retinopathy     Follow consistent carbohydrate meal plan, eat carbs and protein/fat at all meals/snacks. You may be meeting with a dietician or diabetes educator throughout pregnancy.   As a general rule, aim for (15-30g) carbs at breakfast, (45-60g) carbs at lunch, (45-60g) carbs at supper, (15-30g) carbs  with snacks.    It is normal for insulin requirements to drop slightly between weeks 7 and 15 of pregnancy, and increase steadily beginning at 28 weeks through 32 weeks. If your insulin need reduce by over 15% suddenly, after 32 weeks of pregnancy, please consult your OBGYN provider, as this may be related to a condition called placental insufficiency.      Risks of uncontrolled blood sugars in pregnancy:   1) spontaneous miscarriage and stillbirth  2) birth defects  3) fetal growth abnormalities (high birth weight, which increases risk of need for emergency  and associated childbirth complications such as lacerations, bleeding  4) fetal respiratory distress, hypoglycemia, hyperbilirubinemia   5) pre-eclampsia (elevated blood pressure and edema)      Thank you for letting the Diabetes Management Team be involved in your care!    Plan discussed with patient, bedside RN, and primary team           Interval History:     The last 24 hours progress and nursing notes reviewed.   "Notable BG rise from the 0200 reading to the 0600, snacks vs reynold phenomenon    No 2 hr PP taken over the interval.    BG trend:  Escalating this AM, mild delay in getting insulin and coverage  Has now received.       Nausea significantly improved, no further emesis since yesterday at 0630    Reviewed plan in detail - she verbalized understanding  Plan and pregnancy parameters all placed in discharge navigator see above or discharge navigator for details.       10:34 AM Spoke with Dr. Hamilton of  Peds endocrinology and she will have staff reach out to [facility] for follow up - will need to be transferred to Adult Endo/high risk preg for follow up vs Peds Endo as she has been.       Recent Labs   Lab 01/03/22  0605 01/03/22  0213 01/02/22  2200 01/02/22  1822 01/02/22  1425 01/02/22  1011   * 146* 132* 211* 198* 225*           Nutrition:     Orders Placed This Encounter      Advance Diet as Tolerated: Regular Diet Adult        PTA Regimen:     PTA Medications:   Lantus 14 unit(s) daily at 1200  Novolog 1 per 10 g cho from 1 per 7 g cho meals/snacks   Novolog sliding scale insulin has remained 1:50>150 mg/dL  Dexcom CGM      BG monitoring frequency:  Now using fingersticks, 2-3 times per day  Needs refills/insurance clarification for Dexcom          Review of Systems:   CC:  \"I can tell my blood sugar is too high\".  Denies all other complaints now.     Constitutional:   No fever/chills  ENT/Mouth:   No hearing changes, no ear pain, no sore throat, no rhinorrhea, no difficulty swallowing  Eyes:  No eye pain, no discharge, no vision changes  CV:   No CP/SOB, no new edema  Resp:  No cough, no wheezing  GI:   No N/V, denies constipation, denies diarrhea  :  No dysuria, frequency, or hematuria  Musk:  No new joint swelling/pain, no back pain  Skin/heme:   No new rashes/bruises/open areas.  No pruritis  Neuro:   No new weakness, denies numbness/tingling, no headache  Psych:   No new anxiety, denies mood changes or " "depression  Endocrine:  No new polyuria or polydipsia         Medications:   Steroid planning:  none       Physical Exam:   /79 (BP Location: Left arm)   Pulse 87   Temp 98.3  F (36.8  C) (Oral)   Resp 16   Ht 1.499 m (4' 11\")   Wt 58.2 kg (128 lb 6.4 oz)   LMP  (LMP Unknown)   SpO2 99%   BMI 25.93 kg/m      General:   NAD, pleasant, resting comfortably watching cartoons  HEENT:  NC/AT. MMM, sclera not injected  Lungs:  unremarkable, no new cough, no SOB  ABD:   rounded, soft  Skin:  warm and dry, no obvious lesions/rash/bleeding  Feet:   CMS intact, PPP  MSK:   moving all extremities  Lymp:   LE edema  Mental Status:  Baseline, alert and oriented x3  Psych:   Cooperative, good eye contact, full/appropriate affect          Data:     Lab Results   Component Value Date    A1C 8.7 12/23/2021    A1C 9.5 04/17/2021    A1C 9.7 10/11/2019    A1C 11.3 09/15/2019    A1C 12.3 12/09/2018    A1C 11.7 12/08/2018        CBC RESULTS: Recent Labs   Lab Test 01/02/22  0551   WBC 6.7   RBC 3.78*   HGB 10.4*   HCT 32.4*   MCV 86   MCH 27.5   MCHC 32.1   RDW 13.9          Recent Labs   Lab Test 01/03/22  0605 01/03/22  0213 01/02/22  0620 01/02/22  0551 01/02/22  0139 01/01/22 2011   NA  --   --   --  138  --  138   POTASSIUM  --   --   --  3.9  --  3.7   CHLORIDE  --   --   --  109  --  107   CO2  --   --   --  19*  --  22   ANIONGAP  --   --   --  10  --  9   * 146*   < > 234*   < > 98   BUN  --   --   --  9  --  9   CR  --   --   --  0.63  --  0.55   LILIANA  --   --   --  8.2*  --  9.1    < > = values in this interval not displayed.     Liver Function Studies -   Recent Labs   Lab Test 01/01/22 2011   PROTTOTAL 7.7   ALBUMIN 3.4   BILITOTAL 0.5   ALKPHOS 98   AST 11   ALT 13     No results found for: INR    RASHID Velez CNP   Inpatient Diabetes Management Service  Pager - [phone]  Friday - Monday 0800 - 1600 hrs    To contact Endocrine Diabetes service:   From 8AM-4PM: page inpatient diabetes " provider that is following the patient that day (see filed or incomplete progress notes/consult notes).  If uncertain of provider assignment: page job code [phone].  For questions or updates from 4PM-8AM: page the diabetes job code for on call fellow: [phone]    Please notify inpatient diabetes service if changes are planned to steroids, nutrition, or if procedures are planned requiring prolonged NPO status.Diabetes Management Team job code: [phone]    I spent >35 minutes on the date of the encounter doing chart review, history and exam, documentation and further activities per the note.  Over 50% of my time on the unit was spent counseling the patient and/or coordinating care regarding acute hyperglycemic management.  See note for details.

## 2022-01-03 NOTE — DISCHARGE INSTRUCTIONS
IP Diabetes Management Team Discharge Instructions    Glucose Control Regimen:     Resume home dose of Lantus 14-15 unit(s) daily - as discussed,  can increase by unit(s) each day if blood sugars are consistently greater than 150.      Novolog 1 unit(s) per 10 grams of carbohydrate for meals/snacks    ISF 50  1 per 50 > 100 - before meals     For  - 150 give 1 unit.   For - 200 give 2 units.   For - 250 give 3 units.   For  - 300 give 4 units.   For BG = or > 301 give 5 units.    ISF 50  1 per 50 > 120 - before bed  For  - 169 give 1 unit.   For  - 219 give 2 units.   For  - 269 give 3 units.   For  - 319 give 4 units.   For BG = or > 320 give 5 units.    Blood Glucose Checks: three times daily before meals, and at bedtime AND please start 2 hour post prandial (after eating) blood sugar checks as we discussed and write them in a journal for your provider to review.     If able would be best to re-start Dexcom     Reminder - DO NOT correct these 2 hour post-prandial reads    Endocrinology Outpatient follow up:   Follow up with Padmini Givens MD - hopefully as soon as she is able to get your on the schedule.   Will send her a message today to update her on 1.  Pregnancy 2. Hospitalization and need for follow up appointment   Anticipate increased frequency for need of follow up visits.     Please call the clinic at [phone]  if you have non-urgent questions regarding your blood sugars or insulin.     If you have urgent questions or concerns regarding your blood sugars or insulin, you may contact [phone] (the main hospital ). Ask to speak with the endocrinologist on call.    Management of Diabetes in pregnancy:    BG targets  Less than 95 fasting  Less than 140 one hour post-meal  Less than 120 two hours post-meal  Goal of 0 episodes of blood sugars less than 60     A1c goal in pregnancy: 6.5% or less    -TSH lab should be checked once every  trimester    -Ophthalmologist visit at [facility] once in pregnancy (to detect changes in eyes related to pregnancy); twice during pregnancy if you have background diabetic retinopathy     Follow consistent carbohydrate meal plan, eat carbs and protein/fat at all meals/snacks. You may be meeting with a dietician or diabetes educator throughout pregnancy.   As a general rule, aim for (15-30g) carbs at breakfast, (45-60g) carbs at lunch, (45-60g) carbs at supper, (15-30g) carbs  with snacks.    It is normal for insulin requirements to drop slightly between weeks 7 and 15 of pregnancy, and increase steadily beginning at 28 weeks through 32 weeks. If your insulin need reduce by over 15% suddenly, after 32 weeks of pregnancy, please consult your OBGYN provider, as this may be related to a condition called placental insufficiency.      Risks of uncontrolled blood sugars in pregnancy:   1) spontaneous miscarriage and stillbirth  2) birth defects  3) fetal growth abnormalities (high birth weight, which increases risk of need for emergency  and associated childbirth complications such as lacerations, bleeding  4) fetal respiratory distress, hypoglycemia, hyperbilirubinemia   5) pre-eclampsia (elevated blood pressure and edema)      Thank you for letting the Diabetes Management Team be involved in your care!

## 2022-01-04 ENCOUNTER — PATIENT OUTREACH (OUTPATIENT)
Dept: CARE COORDINATION | Facility: CLINIC | Age: 22
End: 2022-01-04
Payer: COMMERCIAL

## 2022-01-04 DIAGNOSIS — Z71.89 OTHER SPECIFIED COUNSELING: ICD-10-CM

## 2022-01-04 NOTE — PROGRESS NOTES
Clinic Care Coordination Contact    Background: Care Coordination referral placed from [facility] discharge report for reason of patient meeting criteria for a TCM outreach call by [facility] team.    Assessment: Upon chart review, [facility] Team member will cancel/close the referral for TCM outreach due to reason below:    Patient declined completing post hospital discharge questions    Plan: Care Coordination referral for TCM outreach canceled.    Nissa Pearce MA  [facility]

## 2022-05-16 ENCOUNTER — HOSPITAL ENCOUNTER (EMERGENCY)
Facility: CLINIC | Age: 22
Discharge: HOME | End: 2022-05-17
Attending: STUDENT IN AN ORGANIZED HEALTH CARE EDUCATION/TRAINING PROGRAM | Admitting: STUDENT IN AN ORGANIZED HEALTH CARE EDUCATION/TRAINING PROGRAM
Payer: COMMERCIAL

## 2022-05-16 VITALS
HEART RATE: 109 BPM | TEMPERATURE: 98.1 F | RESPIRATION RATE: 20 BRPM | DIASTOLIC BLOOD PRESSURE: 82 MMHG | SYSTOLIC BLOOD PRESSURE: 129 MMHG | OXYGEN SATURATION: 100 %

## 2022-05-16 DIAGNOSIS — L02.214 ABSCESS OF GROIN, LEFT: ICD-10-CM

## 2022-05-16 DIAGNOSIS — R10.13 ABDOMINAL PAIN, EPIGASTRIC: Primary | ICD-10-CM

## 2022-05-16 DIAGNOSIS — E16.2 HYPOGLYCEMIA: ICD-10-CM

## 2022-05-16 LAB
ALBUMIN SERPL-MCNC: 3.3 G/DL (ref 3.4–5)
ALBUMIN UR-MCNC: 50 MG/DL
ALP SERPL-CCNC: 179 U/L (ref 40–150)
ALT SERPL W P-5'-P-CCNC: 17 U/L (ref 0–50)
ANION GAP SERPL CALCULATED.3IONS-SCNC: 7 MMOL/L (ref 3–14)
APPEARANCE UR: ABNORMAL
AST SERPL W P-5'-P-CCNC: 18 U/L (ref 0–35)
BASOPHILS # BLD AUTO: 0.1 10E3/UL (ref 0–0.2)
BASOPHILS NFR BLD AUTO: 1 %
BILIRUB SERPL-MCNC: 0.4 MG/DL (ref 0.2–1.3)
BILIRUB UR QL STRIP: NEGATIVE
BUN SERPL-MCNC: 9 MG/DL (ref 7–30)
CALCIUM SERPL-MCNC: 9.2 MG/DL (ref 8.5–10.1)
CHLORIDE BLD-SCNC: 109 MMOL/L (ref 96–110)
CO2 SERPL-SCNC: 25 MMOL/L (ref 20–32)
COLOR UR AUTO: YELLOW
CREAT SERPL-MCNC: 0.65 MG/DL (ref 0.5–1)
EOSINOPHIL # BLD AUTO: 0.1 10E3/UL (ref 0–0.7)
EOSINOPHIL NFR BLD AUTO: 1 %
ERYTHROCYTE [DISTWIDTH] IN BLOOD BY AUTOMATED COUNT: 13.2 % (ref 10–15)
GFR SERPL CREATININE-BSD FRML MDRD: >90 ML/MIN/1.73M2
GLUCOSE BLD-MCNC: 43 MG/DL (ref 70–99)
GLUCOSE BLDC GLUCOMTR-MCNC: 47 MG/DL (ref 70–99)
GLUCOSE UR STRIP-MCNC: NEGATIVE MG/DL
HCG UR QL: NEGATIVE
HCT VFR BLD AUTO: 37.8 % (ref 35–47)
HGB BLD-MCNC: 12 G/DL (ref 11.7–15.7)
HGB UR QL STRIP: NEGATIVE
IMM GRANULOCYTES # BLD: 0 10E3/UL
IMM GRANULOCYTES NFR BLD: 0 %
KETONES UR STRIP-MCNC: ABNORMAL MG/DL
LEUKOCYTE ESTERASE UR QL STRIP: ABNORMAL
LIPASE SERPL-CCNC: 62 U/L (ref 73–393)
LYMPHOCYTES # BLD AUTO: 3.8 10E3/UL (ref 0.8–5.3)
LYMPHOCYTES NFR BLD AUTO: 35 %
MCH RBC QN AUTO: 27.1 PG (ref 26.5–33)
MCHC RBC AUTO-ENTMCNC: 31.7 G/DL (ref 31.5–36.5)
MCV RBC AUTO: 85 FL (ref 78–100)
MONOCYTES # BLD AUTO: 0.6 10E3/UL (ref 0–1.3)
MONOCYTES NFR BLD AUTO: 6 %
MUCOUS THREADS #/AREA URNS LPF: PRESENT /LPF
NEUTROPHILS # BLD AUTO: 6.3 10E3/UL (ref 1.6–8.3)
NEUTROPHILS NFR BLD AUTO: 57 %
NITRATE UR QL: NEGATIVE
NRBC # BLD AUTO: 0 10E3/UL
NRBC BLD AUTO-RTO: 0 /100
PH UR STRIP: 6.5 [PH] (ref 5–7)
PLATELET # BLD AUTO: 336 10E3/UL (ref 150–450)
POTASSIUM BLD-SCNC: 3.2 MMOL/L (ref 3.4–5.3)
PROT SERPL-MCNC: 8.1 G/DL (ref 6.8–8.8)
RBC # BLD AUTO: 4.43 10E6/UL (ref 3.8–5.2)
RBC URINE: 1 /HPF
SODIUM SERPL-SCNC: 141 MMOL/L (ref 133–144)
SP GR UR STRIP: 1.02 (ref 1–1.03)
SQUAMOUS EPITHELIAL: 13 /HPF
TRANSITIONAL EPI: <1 /HPF
UROBILINOGEN UR STRIP-MCNC: 2 MG/DL
WBC # BLD AUTO: 10.8 10E3/UL (ref 4–11)
WBC URINE: 5 /HPF

## 2022-05-16 PROCEDURE — 99284 EMERGENCY DEPT VISIT MOD MDM: CPT | Mod: 25 | Performed by: STUDENT IN AN ORGANIZED HEALTH CARE EDUCATION/TRAINING PROGRAM

## 2022-05-16 PROCEDURE — 83690 ASSAY OF LIPASE: CPT | Performed by: STUDENT IN AN ORGANIZED HEALTH CARE EDUCATION/TRAINING PROGRAM

## 2022-05-16 PROCEDURE — 76882 US LMTD JT/FCL EVL NVASC XTR: CPT | Mod: 26 | Performed by: STUDENT IN AN ORGANIZED HEALTH CARE EDUCATION/TRAINING PROGRAM

## 2022-05-16 PROCEDURE — 80053 COMPREHEN METABOLIC PANEL: CPT | Performed by: STUDENT IN AN ORGANIZED HEALTH CARE EDUCATION/TRAINING PROGRAM

## 2022-05-16 PROCEDURE — 36415 COLL VENOUS BLD VENIPUNCTURE: CPT | Performed by: STUDENT IN AN ORGANIZED HEALTH CARE EDUCATION/TRAINING PROGRAM

## 2022-05-16 PROCEDURE — 10060 I&D ABSCESS SIMPLE/SINGLE: CPT | Performed by: STUDENT IN AN ORGANIZED HEALTH CARE EDUCATION/TRAINING PROGRAM

## 2022-05-16 PROCEDURE — 81025 URINE PREGNANCY TEST: CPT | Performed by: STUDENT IN AN ORGANIZED HEALTH CARE EDUCATION/TRAINING PROGRAM

## 2022-05-16 PROCEDURE — 250N000009 HC RX 250: Performed by: STUDENT IN AN ORGANIZED HEALTH CARE EDUCATION/TRAINING PROGRAM

## 2022-05-16 PROCEDURE — 76882 US LMTD JT/FCL EVL NVASC XTR: CPT | Mod: LT | Performed by: STUDENT IN AN ORGANIZED HEALTH CARE EDUCATION/TRAINING PROGRAM

## 2022-05-16 PROCEDURE — 85014 HEMATOCRIT: CPT | Performed by: STUDENT IN AN ORGANIZED HEALTH CARE EDUCATION/TRAINING PROGRAM

## 2022-05-16 PROCEDURE — 81001 URINALYSIS AUTO W/SCOPE: CPT | Performed by: STUDENT IN AN ORGANIZED HEALTH CARE EDUCATION/TRAINING PROGRAM

## 2022-05-16 RX ORDER — LIDOCAINE HYDROCHLORIDE AND EPINEPHRINE 10; 10 MG/ML; UG/ML
5 INJECTION, SOLUTION INFILTRATION; PERINEURAL ONCE
Status: DISCONTINUED | OUTPATIENT
Start: 2022-05-16 | End: 2022-05-17 | Stop reason: HOSPADM

## 2022-05-16 RX ORDER — LIDOCAINE/PRILOCAINE 2.5 %-2.5%
1 CREAM (GRAM) TOPICAL ONCE
Status: DISCONTINUED | OUTPATIENT
Start: 2022-05-16 | End: 2022-05-17 | Stop reason: HOSPADM

## 2022-05-16 RX ORDER — DOXYCYCLINE 100 MG/1
100 CAPSULE ORAL 2 TIMES DAILY
Qty: 28 CAPSULE | Refills: 0 | Status: SHIPPED | OUTPATIENT
Start: 2022-05-16 | End: 2022-05-30

## 2022-05-16 RX ADMIN — Medication: at 22:10

## 2022-05-16 ASSESSMENT — ENCOUNTER SYMPTOMS
VOMITING: 0
RECTAL PAIN: 0
DYSURIA: 0
DIARRHEA: 1
NAUSEA: 0

## 2022-05-16 NOTE — Clinical Note
Myriam Wylie was seen and treated in our emergency department on 5/16/2022.  She may return to work on 05/19/2022.       If you have any questions or concerns, please don't hesitate to call.      Geronimo Amador, RN

## 2022-05-17 LAB — GLUCOSE BLDC GLUCOMTR-MCNC: 54 MG/DL (ref 70–99)

## 2022-05-17 PROCEDURE — 250N000013 HC RX MED GY IP 250 OP 250 PS 637: Performed by: STUDENT IN AN ORGANIZED HEALTH CARE EDUCATION/TRAINING PROGRAM

## 2022-05-17 RX ORDER — OXYCODONE HYDROCHLORIDE 5 MG/1
5 TABLET ORAL ONCE
Status: COMPLETED | OUTPATIENT
Start: 2022-05-17 | End: 2022-05-17

## 2022-05-17 RX ORDER — ACETAMINOPHEN 500 MG
1000 TABLET ORAL ONCE
Status: COMPLETED | OUTPATIENT
Start: 2022-05-17 | End: 2022-05-17

## 2022-05-17 RX ADMIN — ACETAMINOPHEN 1000 MG: 500 TABLET ORAL at 00:42

## 2022-05-17 RX ADMIN — OXYCODONE HYDROCHLORIDE 5 MG: 5 TABLET ORAL at 00:41

## 2022-05-17 ASSESSMENT — ENCOUNTER SYMPTOMS
NECK STIFFNESS: 0
FEVER: 0
ABDOMINAL PAIN: 1
NECK PAIN: 0

## 2022-05-17 NOTE — PROGRESS NOTES
MD okay to discharge pt with BGL of 54. Pt has been asymptomatic with hypoglycemia and states this number is not abnormal for her.

## 2022-05-17 NOTE — ED TRIAGE NOTES
Patient arrives via triage with complaints of in grown hair or cyst in vaginal area. Reports it has been there for 4 days and is now painful 10/10.      Triage Assessment     Row Name 05/16/22 7619       Triage Assessment (Adult)    Airway WDL WDL       Respiratory WDL    Respiratory WDL WDL       Skin Circulation/Temperature WDL    Skin Circulation/Temperature WDL WDL       Cardiac WDL    Cardiac WDL WDL       Peripheral/Neurovascular WDL    Peripheral Neurovascular WDL WDL       Cognitive/Neuro/Behavioral WDL    Cognitive/Neuro/Behavioral WDL WDL

## 2022-05-17 NOTE — ED PROVIDER NOTES
ED Provider Note  [facility]      History     Chief Complaint   Patient presents with     Cyst     HPI  Myriam Wylie is a 21 year old female with a PMH of DM1, DKA, PID, pyelonephritis and C. difficile colitis who presents to the ED today reporting a cyst in her vaginal area.  Patient endorses a painful cyst on her right inner thigh.  She also endorses intermittent sharp pelvic pains that radiate up into her abdomen.  She also notes intermittent diarrhea.  She states that this cyst has been present for 4 days, noting that it started as an ingrown hair that she removed.  She states that she has had something similar with a past UTI, noting that the cyst was blocking her vaginal opening.  She states that her LMP was May 1st-6th, noting that her period started a week early.  She states that her blood sugars have been up and down since her symptoms started.  She endorses a history of numerous hospitalizations for DKA, noting she was in two weekends ago.  Patient denies pain with bowel movements, dysuria, vaginal discharge, pain with sex, nausea or vomiting.    Past Medical History  Past Medical History:   Diagnosis Date     C. difficile colitis      Diabetes type 1, uncontrolled (H)      DKA (diabetic ketoacidosis) (H)      PID (acute pelvic inflammatory disease)      Past Surgical History:   Procedure Laterality Date     COLONOSCOPY N/A 9/18/2019    Procedure: COLONOSCOPY;  Surgeon: Jeremi Banks MD;  Location: [facility]      ESOPHAGOSCOPY, GASTROSCOPY, DUODENOSCOPY (EGD), COMBINED N/A 9/18/2019    Procedure: Upper endoscopy and colonoscopy with biopsy;  Surgeon: Jeremi Banks MD;  Location: [facility]      doxycycline hyclate (VIBRAMYCIN) 100 MG capsule  acetone, Urine, test STRP  blood glucose monitoring (ACCU-CHEK FASTCLIX) lancets  blood glucose monitoring (ACCU-CHEK HENRI SMARTVIEW) meter device kit  blood glucose monitoring (ACCU-CHEK SMARTVIEW) test strip  famotidine  (PEPCID) 20 MG tablet  Glucagon 0.5 MG/0.1ML SOSY  insulin aspart (NOVOLOG PEN) 100 UNIT/ML pen  insulin aspart (NOVOLOG PEN) 100 UNIT/ML pen  insulin aspart (NOVOLOG PEN) 100 UNIT/ML pen  insulin glargine (BASAGLAR KWIKPEN) 100 UNIT/ML pen  insulin pen needle (BD HENRI U/F) 32G X 4 MM  ondansetron (ZOFRAN) 4 MG tablet  Prenatal Vit-Fe Fumarate-FA (PRENATAL MULTIVITAMIN W/IRON) 27-0.8 MG tablet  pyridOXINE (VITAMIN B6) 25 MG tablet      No Known Allergies  Family History  Family History   Problem Relation Age of Onset     Diabetes No family hx of         type 1 diabetes or autoimmunity     Social History   Social History     Tobacco Use     Smoking status: Never Smoker     Smokeless tobacco: Never Used   Substance Use Topics     Alcohol use: Not Currently     Drug use: No      Past medical history, past surgical history, medications, allergies, family history, and social history were reviewed with the patient. No additional pertinent items.       Review of Systems   Constitutional: Negative for fever.   Gastrointestinal: Positive for abdominal pain and diarrhea. Negative for nausea, rectal pain and vomiting.   Genitourinary: Negative for dysuria, vaginal discharge and vaginal pain. Pelvic pain: (Sharp, radiating up into abdomen)        (Positive for cyst of right inner thigh)   Musculoskeletal: Negative for neck pain and neck stiffness.   All other systems reviewed and are negative.    A complete review of systems was performed with pertinent positives and negatives noted in the HPI, and all other systems negative.    Physical Exam   BP: (!) 144/101  Pulse: 109  Temp: 98.1  F (36.7  C)  Resp: 20  SpO2: 100 %  Physical Exam  Vitals and nursing note reviewed.   Constitutional:       General: She is not in acute distress.     Appearance: She is not ill-appearing, toxic-appearing or diaphoretic.   HENT:      Head: Normocephalic and atraumatic.      Right Ear: External ear normal.      Left Ear: External ear normal.       Nose: Nose normal.      Mouth/Throat:      Pharynx: No oropharyngeal exudate.   Eyes:      General: No scleral icterus.     Extraocular Movements: Extraocular movements intact.      Conjunctiva/sclera: Conjunctivae normal.      Pupils: Pupils are equal, round, and reactive to light.   Cardiovascular:      Rate and Rhythm: Normal rate and regular rhythm.      Heart sounds: Normal heart sounds.   Pulmonary:      Effort: Pulmonary effort is normal. No respiratory distress.      Breath sounds: Normal breath sounds.   Abdominal:      General: Bowel sounds are normal. There is no distension.      Palpations: Abdomen is soft.      Tenderness: There is no abdominal tenderness.   Genitourinary:     Comments: External genital exam chaperoned by JUAN Eason    Approx 1 cm abscess to L groin area, does not involve vaginal walls, does not track towards anal area.  Tender to palpation  Musculoskeletal:         General: No tenderness, deformity or signs of injury.      Cervical back: Neck supple. No rigidity.   Skin:     General: Skin is warm.      Findings: No rash.   Neurological:      General: No focal deficit present.      Mental Status: She is alert. Mental status is at baseline.   Psychiatric:         Mood and Affect: Mood normal.         Behavior: Behavior normal.       ED Course     9:30 PM  The patient was seen and examined by Mary Archuleta MD in Room [facility]    PROCEDURE: -Incision/Drainage    Date/Time: 5/17/2022 1:45 AM  Performed by: Mary Archuleta MD  Authorized by: Mary Archuleta MD     Risks, benefits and alternatives discussed.      LOCATION:      Type:  Abscess    Size:  1 cm    Location:  Anogenital    Anogenital location: in groin adjacent to vulva.    PRE-PROCEDURE DETAILS:     Procedure prep: Alcohol pad.    PROCEDURE TYPE:     Complexity:  Complex    ANESTHESIA (see MAR for exact dosages):     Anesthesia method:  Topical application and local  infiltration    Topical anesthetic:  EMLA cream    Local anesthetic:  Lidocaine 1% WITH epi (3ml total injected)    PROCEDURE DETAILS:     Needle aspiration: no      Incision types:  Single straight    Incision depth:  Subcutaneous    Scalpel blade:  10    Wound management:  Probed and deloculated, irrigated with saline and extensive cleaning    Drainage:  Bloody and purulent    Drainage amount:  Moderate    Wound treatment:  Wound left open    Packing materials:  None    PROCEDURE    Patient Tolerance:  Patient tolerated the procedure well with no immediate complications    Results for orders placed during the hospital encounter of 05/16/22    POC US SOFT TISSUE    Impression  [facility] Procedure Note    Limited Bedside ED Ultrasound of Soft Tissue:    PROCEDURE: PERFORMED BY: Dr. Mary Archuleta MD  INDICATIONS/SYMPTOM: Skin redness, evaluate for abscess, cellulitis or foreign body  PROBE: High frequency linear probe  BODY LOCATION: Soft tissue located on L groin just outside of vaginal area    FINDINGS: Cobblestoning of soft tissue: present  Hypoechoic fluid (ie abscess) identified: present measuring 1 cm  US utilized to access fluid pocket with sterile blade  INTERPRETATION:  The soft tissue and muscle layers were evaluated.  Findings indicate cellulitis and abscess    IMAGE DOCUMENTATION: Images were archived to PACs system.       The medical record was reviewed and interpreted.  Current labs reviewed and interpreted.  Current images reviewed and interpreted: Bedside ultrasound with cobblestoning, concerning for cellulitis and complex fluid collection with anechoic pieces concerning for underlying abscess.  Managed outpatient prescription medications.              Results for orders placed or performed during the hospital encounter of 05/16/22   POC US SOFT TISSUE     Status: None    Impression    [facility] Procedure Note     Limited Bedside ED Ultrasound of Soft Tissue:    PROCEDURE: PERFORMED  BY: Dr. Mary Archuleta MD  INDICATIONS/SYMPTOM: Skin redness, evaluate for abscess, cellulitis or foreign body  PROBE: High frequency linear probe  BODY LOCATION: Soft tissue located on L groin just outside of vaginal area     FINDINGS: Cobblestoning of soft tissue: present   Hypoechoic fluid (ie abscess) identified: present measuring 1 cm   US utilized to access fluid pocket with sterile blade  INTERPRETATION:  The soft tissue and muscle layers were evaluated.      Findings indicate cellulitis and abscess    IMAGE DOCUMENTATION: Images were archived to PACs system.     Comprehensive metabolic panel     Status: Abnormal   Result Value Ref Range    Sodium 141 133 - 144 mmol/L    Potassium 3.2 (L) 3.4 - 5.3 mmol/L    Chloride 109 96 - 110 mmol/L    Carbon Dioxide (CO2) 25 20 - 32 mmol/L    Anion Gap 7 3 - 14 mmol/L    Urea Nitrogen 9 7 - 30 mg/dL    Creatinine 0.65 0.50 - 1.00 mg/dL    Calcium 9.2 8.5 - 10.1 mg/dL    Glucose 43 (LL) 70 - 99 mg/dL    Alkaline Phosphatase 179 (H) 40 - 150 U/L    AST 18 0 - 35 U/L    ALT 17 0 - 50 U/L    Protein Total 8.1 6.8 - 8.8 g/dL    Albumin 3.3 (L) 3.4 - 5.0 g/dL    Bilirubin Total 0.4 0.2 - 1.3 mg/dL    GFR Estimate >90 >60 mL/min/1.73m2   Lipase     Status: Abnormal   Result Value Ref Range    Lipase 62 (L) 73 - 393 U/L   HCG qualitative urine     Status: Normal   Result Value Ref Range    hCG Urine Qualitative Negative Negative   UA with Microscopic reflex to Culture     Status: Abnormal    Specimen: Urine, Midstream   Result Value Ref Range    Color Urine Yellow Colorless, Straw, Light Yellow, Yellow    Appearance Urine Slightly Cloudy (A) Clear    Glucose Urine Negative Negative mg/dL    Bilirubin Urine Negative Negative    Ketones Urine Trace (A) Negative mg/dL    Specific Gravity Urine 1.020 1.003 - 1.035    Blood Urine Negative Negative    pH Urine 6.5 5.0 - 7.0    Protein Albumin Urine 50  (A) Negative mg/dL    Urobilinogen Urine 2.0 Normal, 2.0 mg/dL    Nitrite Urine  Negative Negative    Leukocyte Esterase Urine Trace (A) Negative    Mucus Urine Present (A) None Seen /LPF    RBC Urine 1 <=2 /HPF    WBC Urine 5 <=5 /HPF    Squamous Epithelials Urine 13 (H) <=1 /HPF    Transitional Epithelials Urine <1 <=1 /HPF    Narrative    Urine Culture not indicated   CBC with platelets and differential     Status: None   Result Value Ref Range    WBC Count 10.8 4.0 - 11.0 10e3/uL    RBC Count 4.43 3.80 - 5.20 10e6/uL    Hemoglobin 12.0 11.7 - 15.7 g/dL    Hematocrit 37.8 35.0 - 47.0 %    MCV 85 78 - 100 fL    MCH 27.1 26.5 - 33.0 pg    MCHC 31.7 31.5 - 36.5 g/dL    RDW 13.2 10.0 - 15.0 %    Platelet Count 336 150 - 450 10e3/uL    % Neutrophils 57 %    % Lymphocytes 35 %    % Monocytes 6 %    % Eosinophils 1 %    % Basophils 1 %    % Immature Granulocytes 0 %    NRBCs per 100 WBC 0 <1 /100    Absolute Neutrophils 6.3 1.6 - 8.3 10e3/uL    Absolute Lymphocytes 3.8 0.8 - 5.3 10e3/uL    Absolute Monocytes 0.6 0.0 - 1.3 10e3/uL    Absolute Eosinophils 0.1 0.0 - 0.7 10e3/uL    Absolute Basophils 0.1 0.0 - 0.2 10e3/uL    Absolute Immature Granulocytes 0.0 <=0.4 10e3/uL    Absolute NRBCs 0.0 10e3/uL   Glucose by meter     Status: Abnormal   Result Value Ref Range    GLUCOSE BY METER POCT 47 (LL) 70 - 99 mg/dL   Glucose by meter     Status: Abnormal   Result Value Ref Range    GLUCOSE BY METER POCT 54 (L) 70 - 99 mg/dL   CBC with platelets differential     Status: None    Narrative    The following orders were created for panel order CBC with platelets differential.  Procedure                               Abnormality         Status                     ---------                               -----------         ------                     CBC with platelets and d...[[id number]]                      Final result                 Please view results for these tests on the individual orders.     Medications   lidocaine-prilocaine (EMLA) cream 1 g ( Topical Canceled Entry 5/16/22 5598)   lidocaine 1% with  EPINEPHrine 1:100,000 injection 5 mL (has no administration in time range)   lido-EPINEPHrine-tetracaine (LET) topical gel GEL ( Topical Given 5/16/22 2210)   acetaminophen (TYLENOL) tablet 1,000 mg (1,000 mg Oral Given 5/17/22 0042)   oxyCODONE (ROXICODONE) tablet 5 mg (5 mg Oral Given 5/17/22 0041)        Assessments & Plan (with Medical Decision Making)   MDM:    21 year old F who presents for abscess in the groin in the setting of ingrown hair.  Labs show hyperglycemia, she is awake, alert, oriented, able to take p.o. so she was given p.o. with improvement to 54, which she states is not uncommon for her to run.  No evidence of DKA or HHS, no evidence of urinary tract infection, pregnancy test is negative.  Abscess was drained as described in the attached procedure note, she tolerated the procedure well, obstructed to soak in warm baths to keep the incision open and encourage further drainage of this infection.  We will also send with course of doxycycline.    On reevaluation she is clinically improved.  I discussed all test results and the plan of care with the patient, who is agreeable to discharge with outpatient follow-up.  Strict return precautions given and all questions answered prior to discharge.    I have reviewed the nursing notes. I have reviewed the findings, diagnosis, plan and need for follow up with the patient.    Discharge Medication List as of 5/17/2022 12:44 AM      START taking these medications    Details   doxycycline hyclate (VIBRAMYCIN) 100 MG capsule Take 1 capsule (100 mg) by mouth 2 times daily for 14 days, Disp-28 capsule, R-0, E-Prescribe             Final diagnoses:   Abdominal pain, epigastric   Abscess of groin, left   Hypoglycemia   IDmitry, am serving as a trained medical scribe to document services personally performed by Mary Archuleta MD, based on the provider's statements to me.     IMary MD, was physically present and have reviewed and verified the  accuracy of this note documented by Dmitry Holland.      --  Mary Archuleta MD  [facility] EMERGENCY DEPARTMENT  5/16/2022

## 2022-05-26 NOTE — NURSING NOTE
"Chief Complaint   Patient presents with     RECHECK     Diabetes       Initial /75  Pulse 104  Ht 4' 11.45\" (151 cm)  Wt 126 lb 15.8 oz (57.6 kg)  BMI 25.26 kg/m2 Estimated body mass index is 25.26 kg/(m^2) as calculated from the following:    Height as of this encounter: 4' 11.45\" (151 cm).    Weight as of this encounter: 126 lb 15.8 oz (57.6 kg).  "
Yes

## 2022-06-02 ENCOUNTER — HOSPITAL ENCOUNTER (INPATIENT)
Facility: CLINIC | Age: 22
LOS: 1 days | Discharge: HOME | DRG: 639 | End: 2022-06-03
Attending: EMERGENCY MEDICINE | Admitting: INTERNAL MEDICINE
Payer: COMMERCIAL

## 2022-06-02 DIAGNOSIS — Z79.4 ENCOUNTER FOR LONG-TERM (CURRENT) USE OF INSULIN (H): ICD-10-CM

## 2022-06-02 DIAGNOSIS — Z79.84 LONG TERM CURRENT USE OF ORAL HYPOGLYCEMIC DRUG: ICD-10-CM

## 2022-06-02 DIAGNOSIS — Z11.52 ENCOUNTER FOR SCREENING LABORATORY TESTING FOR SEVERE ACUTE RESPIRATORY SYNDROME CORONAVIRUS 2 (SARS-COV-2): ICD-10-CM

## 2022-06-02 DIAGNOSIS — E10.10 DIABETIC KETOACIDOSIS WITHOUT COMA ASSOCIATED WITH TYPE 1 DIABETES MELLITUS (H): ICD-10-CM

## 2022-06-02 LAB
ALBUMIN SERPL-MCNC: 3.3 G/DL (ref 3.4–5)
ALBUMIN UR-MCNC: NEGATIVE MG/DL
ALP SERPL-CCNC: 146 U/L (ref 40–150)
ALT SERPL W P-5'-P-CCNC: 22 U/L (ref 0–50)
ANION GAP SERPL CALCULATED.3IONS-SCNC: 12 MMOL/L (ref 3–14)
APPEARANCE UR: CLEAR
AST SERPL W P-5'-P-CCNC: ABNORMAL U/L
BASOPHILS # BLD AUTO: 0 10E3/UL (ref 0–0.2)
BASOPHILS NFR BLD AUTO: 0 %
BILIRUB SERPL-MCNC: 0.6 MG/DL (ref 0.2–1.3)
BILIRUB UR QL STRIP: NEGATIVE
BUN SERPL-MCNC: 15 MG/DL (ref 7–30)
CALCIUM SERPL-MCNC: 8.8 MG/DL (ref 8.5–10.1)
CHLORIDE BLD-SCNC: 105 MMOL/L (ref 96–110)
CO2 SERPL-SCNC: 20 MMOL/L (ref 20–32)
COLOR UR AUTO: ABNORMAL
CREAT SERPL-MCNC: 0.73 MG/DL (ref 0.5–1)
EOSINOPHIL # BLD AUTO: 0 10E3/UL (ref 0–0.7)
EOSINOPHIL NFR BLD AUTO: 0 %
ERYTHROCYTE [DISTWIDTH] IN BLOOD BY AUTOMATED COUNT: 13.4 % (ref 10–15)
GFR SERPL CREATININE-BSD FRML MDRD: >90 ML/MIN/1.73M2
GLUCOSE BLD-MCNC: 414 MG/DL (ref 70–99)
GLUCOSE BLDC GLUCOMTR-MCNC: 163 MG/DL (ref 70–99)
GLUCOSE BLDC GLUCOMTR-MCNC: 239 MG/DL (ref 70–99)
GLUCOSE BLDC GLUCOMTR-MCNC: 308 MG/DL (ref 70–99)
GLUCOSE UR STRIP-MCNC: >=1000 MG/DL
HBA1C MFR BLD: 9.8 % (ref 0–5.6)
HCG UR QL: NEGATIVE
HCT VFR BLD AUTO: 38.8 % (ref 35–47)
HGB BLD-MCNC: 12.5 G/DL (ref 11.7–15.7)
HGB UR QL STRIP: NEGATIVE
IMM GRANULOCYTES # BLD: 0 10E3/UL
IMM GRANULOCYTES NFR BLD: 0 %
KETONES BLD-SCNC: 3.5 MMOL/L (ref 0–0.6)
KETONES UR STRIP-MCNC: 80 MG/DL
LACTATE SERPL-SCNC: 1.5 MMOL/L (ref 0.7–2)
LACTATE SERPL-SCNC: 2.5 MMOL/L (ref 0.7–2)
LEUKOCYTE ESTERASE UR QL STRIP: NEGATIVE
LYMPHOCYTES # BLD AUTO: 0.7 10E3/UL (ref 0.8–5.3)
LYMPHOCYTES NFR BLD AUTO: 9 %
MCH RBC QN AUTO: 27.2 PG (ref 26.5–33)
MCHC RBC AUTO-ENTMCNC: 32.2 G/DL (ref 31.5–36.5)
MCV RBC AUTO: 84 FL (ref 78–100)
MONOCYTES # BLD AUTO: 0.2 10E3/UL (ref 0–1.3)
MONOCYTES NFR BLD AUTO: 3 %
MUCOUS THREADS #/AREA URNS LPF: PRESENT /LPF
NEUTROPHILS # BLD AUTO: 6.5 10E3/UL (ref 1.6–8.3)
NEUTROPHILS NFR BLD AUTO: 88 %
NITRATE UR QL: NEGATIVE
NRBC # BLD AUTO: 0 10E3/UL
NRBC BLD AUTO-RTO: 0 /100
PH UR STRIP: 6 [PH] (ref 5–7)
PLATELET # BLD AUTO: 216 10E3/UL (ref 150–450)
POTASSIUM BLD-SCNC: 4.1 MMOL/L (ref 3.4–5.3)
POTASSIUM BLD-SCNC: 5.9 MMOL/L (ref 3.4–5.3)
PROT SERPL-MCNC: 8.2 G/DL (ref 6.8–8.8)
RBC # BLD AUTO: 4.6 10E6/UL (ref 3.8–5.2)
RBC URINE: 1 /HPF
SARS-COV-2 RNA RESP QL NAA+PROBE: NEGATIVE
SODIUM SERPL-SCNC: 137 MMOL/L (ref 133–144)
SP GR UR STRIP: 1.03 (ref 1–1.03)
SQUAMOUS EPITHELIAL: 3 /HPF
UROBILINOGEN UR STRIP-MCNC: NORMAL MG/DL
WBC # BLD AUTO: 7.4 10E3/UL (ref 4–11)
WBC URINE: <1 /HPF

## 2022-06-02 PROCEDURE — 93010 ELECTROCARDIOGRAM REPORT: CPT | Performed by: INTERNAL MEDICINE

## 2022-06-02 PROCEDURE — 99285 EMERGENCY DEPT VISIT HI MDM: CPT | Performed by: EMERGENCY MEDICINE

## 2022-06-02 PROCEDURE — 84155 ASSAY OF PROTEIN SERUM: CPT | Performed by: EMERGENCY MEDICINE

## 2022-06-02 PROCEDURE — 87040 BLOOD CULTURE FOR BACTERIA: CPT | Performed by: INTERNAL MEDICINE

## 2022-06-02 PROCEDURE — 84132 ASSAY OF SERUM POTASSIUM: CPT | Performed by: EMERGENCY MEDICINE

## 2022-06-02 PROCEDURE — 96375 TX/PRO/DX INJ NEW DRUG ADDON: CPT | Performed by: EMERGENCY MEDICINE

## 2022-06-02 PROCEDURE — 96361 HYDRATE IV INFUSION ADD-ON: CPT | Performed by: EMERGENCY MEDICINE

## 2022-06-02 PROCEDURE — 258N000003 HC RX IP 258 OP 636: Performed by: EMERGENCY MEDICINE

## 2022-06-02 PROCEDURE — U0003 INFECTIOUS AGENT DETECTION BY NUCLEIC ACID (DNA OR RNA); SEVERE ACUTE RESPIRATORY SYNDROME CORONAVIRUS 2 (SARS-COV-2) (CORONAVIRUS DISEASE [COVID-19]), AMPLIFIED PROBE TECHNIQUE, MAKING USE OF HIGH THROUGHPUT TECHNOLOGIES AS DESCRIBED BY CMS-2020-01-R: HCPCS | Performed by: EMERGENCY MEDICINE

## 2022-06-02 PROCEDURE — 83605 ASSAY OF LACTIC ACID: CPT | Performed by: EMERGENCY MEDICINE

## 2022-06-02 PROCEDURE — C9803 HOPD COVID-19 SPEC COLLECT: HCPCS | Performed by: EMERGENCY MEDICINE

## 2022-06-02 PROCEDURE — 120N000002 HC R&B MED SURG/OB UMMC

## 2022-06-02 PROCEDURE — 85025 COMPLETE CBC W/AUTO DIFF WBC: CPT | Performed by: EMERGENCY MEDICINE

## 2022-06-02 PROCEDURE — 99223 1ST HOSP IP/OBS HIGH 75: CPT | Mod: AI | Performed by: INTERNAL MEDICINE

## 2022-06-02 PROCEDURE — 258N000003 HC RX IP 258 OP 636

## 2022-06-02 PROCEDURE — 36415 COLL VENOUS BLD VENIPUNCTURE: CPT | Performed by: EMERGENCY MEDICINE

## 2022-06-02 PROCEDURE — 99285 EMERGENCY DEPT VISIT HI MDM: CPT | Mod: 25 | Performed by: EMERGENCY MEDICINE

## 2022-06-02 PROCEDURE — 36415 COLL VENOUS BLD VENIPUNCTURE: CPT | Performed by: INTERNAL MEDICINE

## 2022-06-02 PROCEDURE — 250N000009 HC RX 250: Performed by: INTERNAL MEDICINE

## 2022-06-02 PROCEDURE — 250N000009 HC RX 250: Performed by: EMERGENCY MEDICINE

## 2022-06-02 PROCEDURE — 83036 HEMOGLOBIN GLYCOSYLATED A1C: CPT | Performed by: EMERGENCY MEDICINE

## 2022-06-02 PROCEDURE — 83605 ASSAY OF LACTIC ACID: CPT | Performed by: INTERNAL MEDICINE

## 2022-06-02 PROCEDURE — 81001 URINALYSIS AUTO W/SCOPE: CPT | Performed by: EMERGENCY MEDICINE

## 2022-06-02 PROCEDURE — 96365 THER/PROPH/DIAG IV INF INIT: CPT | Performed by: EMERGENCY MEDICINE

## 2022-06-02 PROCEDURE — 250N000011 HC RX IP 250 OP 636: Performed by: EMERGENCY MEDICINE

## 2022-06-02 PROCEDURE — 81025 URINE PREGNANCY TEST: CPT | Performed by: EMERGENCY MEDICINE

## 2022-06-02 PROCEDURE — 82010 KETONE BODYS QUAN: CPT | Performed by: EMERGENCY MEDICINE

## 2022-06-02 RX ORDER — SODIUM CHLORIDE 9 MG/ML
INJECTION, SOLUTION INTRAVENOUS
Status: COMPLETED
Start: 2022-06-02 | End: 2022-06-02

## 2022-06-02 RX ORDER — NICOTINE POLACRILEX 4 MG
15-30 LOZENGE BUCCAL
Status: DISCONTINUED | OUTPATIENT
Start: 2022-06-02 | End: 2022-06-03 | Stop reason: HOSPADM

## 2022-06-02 RX ORDER — SODIUM CHLORIDE 9 MG/ML
INJECTION, SOLUTION INTRAVENOUS CONTINUOUS
Status: DISCONTINUED | OUTPATIENT
Start: 2022-06-02 | End: 2022-06-03 | Stop reason: HOSPADM

## 2022-06-02 RX ORDER — FAMOTIDINE 20 MG/1
20 TABLET, FILM COATED ORAL 2 TIMES DAILY
Status: DISCONTINUED | OUTPATIENT
Start: 2022-06-03 | End: 2022-06-03 | Stop reason: HOSPADM

## 2022-06-02 RX ORDER — SCOLOPAMINE TRANSDERMAL SYSTEM 1 MG/1
1 PATCH, EXTENDED RELEASE TRANSDERMAL
Status: DISCONTINUED | OUTPATIENT
Start: 2022-06-02 | End: 2022-06-03 | Stop reason: HOSPADM

## 2022-06-02 RX ORDER — DEXTROSE MONOHYDRATE 25 G/50ML
25-50 INJECTION, SOLUTION INTRAVENOUS
Status: DISCONTINUED | OUTPATIENT
Start: 2022-06-02 | End: 2022-06-03

## 2022-06-02 RX ORDER — DEXTROSE MONOHYDRATE 25 G/50ML
25-50 INJECTION, SOLUTION INTRAVENOUS
Status: DISCONTINUED | OUTPATIENT
Start: 2022-06-02 | End: 2022-06-03 | Stop reason: HOSPADM

## 2022-06-02 RX ORDER — LIDOCAINE 40 MG/G
CREAM TOPICAL
Status: DISCONTINUED | OUTPATIENT
Start: 2022-06-02 | End: 2022-06-03 | Stop reason: HOSPADM

## 2022-06-02 RX ORDER — HYDROMORPHONE HYDROCHLORIDE 1 MG/ML
0.5 INJECTION, SOLUTION INTRAMUSCULAR; INTRAVENOUS; SUBCUTANEOUS
Status: COMPLETED | OUTPATIENT
Start: 2022-06-02 | End: 2022-06-02

## 2022-06-02 RX ORDER — ONDANSETRON 2 MG/ML
4 INJECTION INTRAMUSCULAR; INTRAVENOUS EVERY 30 MIN PRN
Status: DISCONTINUED | OUTPATIENT
Start: 2022-06-02 | End: 2022-06-03 | Stop reason: HOSPADM

## 2022-06-02 RX ADMIN — SODIUM CHLORIDE: 9 INJECTION, SOLUTION INTRAVENOUS at 20:07

## 2022-06-02 RX ADMIN — Medication 1000 ML: at 17:51

## 2022-06-02 RX ADMIN — SCOPALAMINE 1 PATCH: 1 PATCH, EXTENDED RELEASE TRANSDERMAL at 21:50

## 2022-06-02 RX ADMIN — ONDANSETRON 4 MG: 2 INJECTION INTRAMUSCULAR; INTRAVENOUS at 17:58

## 2022-06-02 RX ADMIN — HYDROMORPHONE HYDROCHLORIDE 0.5 MG: 1 INJECTION, SOLUTION INTRAMUSCULAR; INTRAVENOUS; SUBCUTANEOUS at 17:54

## 2022-06-02 RX ADMIN — HUMAN INSULIN 1 UNITS/HR: 100 INJECTION, SOLUTION SUBCUTANEOUS at 23:13

## 2022-06-02 RX ADMIN — HUMAN INSULIN: 100 INJECTION, SOLUTION SUBCUTANEOUS at 21:02

## 2022-06-02 RX ADMIN — SODIUM CHLORIDE 1000 ML: 9 INJECTION, SOLUTION INTRAVENOUS at 17:51

## 2022-06-02 ASSESSMENT — ACTIVITIES OF DAILY LIVING (ADL)
ADLS_ACUITY_SCORE: 35

## 2022-06-02 NOTE — ED NOTES
Bed: ED18  Expected date: 6/2/22  Expected time:   Means of arrival:   Comments:  Hen 411   20 y/o n/v elevated BG

## 2022-06-02 NOTE — ED PROVIDER NOTES
ED Provider Note  [facility]      History     Chief Complaint   Patient presents with     Nausea & Vomiting     HPI  Myriam Wylie is a 21 year old female with a history of type 1 diabetes who presents with nausea and vomiting.  Patient states that she has had intermittent nausea and vomiting for the past month but it became more severe in the past 2 days.  She also notes diarrhea with this.  She has abdominal pain and has had very little p.o. intake.  She states this does feel similar to when she has had DKA in the past.  No fever chills or infectious symptoms.  States since that started her blood glucose has been running high.  No other symptoms noted.    Past Medical History  Past Medical History:   Diagnosis Date     C. difficile colitis      Diabetes type 1, uncontrolled (H)      DKA (diabetic ketoacidosis) (H)      PID (acute pelvic inflammatory disease)      Past Surgical History:   Procedure Laterality Date     COLONOSCOPY N/A 9/18/2019    Procedure: COLONOSCOPY;  Surgeon: Jeremi Banks MD;  Location: [facility]      ESOPHAGOSCOPY, GASTROSCOPY, DUODENOSCOPY (EGD), COMBINED N/A 9/18/2019    Procedure: Upper endoscopy and colonoscopy with biopsy;  Surgeon: Jeremi Banks MD;  Location: [facility]      insulin aspart (NOVOLOG PEN) 100 UNIT/ML pen  ondansetron (ZOFRAN) 4 MG tablet  acetone, Urine, test STRP  blood glucose monitoring (ACCU-CHEK FASTCLIX) lancets  blood glucose monitoring (ACCU-CHEK HENRI SMARTVIEW) meter device kit  blood glucose monitoring (ACCU-CHEK SMARTVIEW) test strip  famotidine (PEPCID) 20 MG tablet  Glucagon 0.5 MG/0.1ML SOSY  insulin aspart (NOVOLOG PEN) 100 UNIT/ML pen  insulin aspart (NOVOLOG PEN) 100 UNIT/ML pen  insulin glargine (BASAGLAR KWIKPEN) 100 UNIT/ML pen  insulin pen needle (BD HENRI U/F) 32G X 4 MM  Prenatal Vit-Fe Fumarate-FA (PRENATAL MULTIVITAMIN W/IRON) 27-0.8 MG tablet  pyridOXINE (VITAMIN B6) 25 MG tablet      No Known  Allergies  Family History  Family History   Problem Relation Age of Onset     Diabetes No family hx of         type 1 diabetes or autoimmunity     Social History   Social History     Tobacco Use     Smoking status: Never Smoker     Smokeless tobacco: Never Used   Substance Use Topics     Alcohol use: Not Currently     Drug use: No      Past medical history, past surgical history, medications, allergies, family history, and social history were reviewed with the patient. No additional pertinent items.       Review of Systems  A complete review of systems was performed with pertinent positives and negatives noted in the HPI, and all other systems negative.    Physical Exam   BP: 113/51  Pulse: 79  Temp: 98.1  F (36.7  C)  SpO2: 100 %  Physical Exam  Vitals and nursing note reviewed.   Constitutional:       General: She is not in acute distress.     Appearance: She is well-developed. She is not diaphoretic.   HENT:      Head: Normocephalic and atraumatic.      Mouth/Throat:      Pharynx: No oropharyngeal exudate.   Eyes:      General: No scleral icterus.        Right eye: No discharge.         Left eye: No discharge.      Pupils: Pupils are equal, round, and reactive to light.   Cardiovascular:      Rate and Rhythm: Normal rate and regular rhythm.      Heart sounds: Normal heart sounds. No murmur heard.    No friction rub. No gallop.   Pulmonary:      Effort: Pulmonary effort is normal. No respiratory distress.      Breath sounds: Normal breath sounds. No wheezing.   Chest:      Chest wall: No tenderness.   Abdominal:      General: Bowel sounds are normal. There is no distension.      Palpations: Abdomen is soft.      Tenderness: There is abdominal tenderness.   Musculoskeletal:         General: No tenderness or deformity. Normal range of motion.      Cervical back: Normal range of motion and neck supple.   Skin:     General: Skin is warm and dry.      Coloration: Skin is not pale.      Findings: No erythema or rash.    Neurological:      Mental Status: She is alert and oriented to person, place, and time.      Cranial Nerves: No cranial nerve deficit.         ED Course      Procedures                     Results for orders placed or performed during the hospital encounter of 06/02/22   Lactic acid whole blood     Status: Abnormal   Result Value Ref Range    Lactic Acid 2.5 (H) 0.7 - 2.0 mmol/L   HCG qualitative urine (UPT)     Status: Normal   Result Value Ref Range    hCG Urine Qualitative Negative Negative   Ketone Beta-Hydroxybutyrate Quantitative     Status: Abnormal   Result Value Ref Range    Ketone (Beta-Hydroxybutyrate) Quantitative 3.5 (HH) 0.0 - 0.6 mmol/L   CBC with platelets and differential     Status: Abnormal   Result Value Ref Range    WBC Count 7.4 4.0 - 11.0 10e3/uL    RBC Count 4.60 3.80 - 5.20 10e6/uL    Hemoglobin 12.5 11.7 - 15.7 g/dL    Hematocrit 38.8 35.0 - 47.0 %    MCV 84 78 - 100 fL    MCH 27.2 26.5 - 33.0 pg    MCHC 32.2 31.5 - 36.5 g/dL    RDW 13.4 10.0 - 15.0 %    Platelet Count 216 150 - 450 10e3/uL    % Neutrophils 88 %    % Lymphocytes 9 %    % Monocytes 3 %    % Eosinophils 0 %    % Basophils 0 %    % Immature Granulocytes 0 %    NRBCs per 100 WBC 0 <1 /100    Absolute Neutrophils 6.5 1.6 - 8.3 10e3/uL    Absolute Lymphocytes 0.7 (L) 0.8 - 5.3 10e3/uL    Absolute Monocytes 0.2 0.0 - 1.3 10e3/uL    Absolute Eosinophils 0.0 0.0 - 0.7 10e3/uL    Absolute Basophils 0.0 0.0 - 0.2 10e3/uL    Absolute Immature Granulocytes 0.0 <=0.4 10e3/uL    Absolute NRBCs 0.0 10e3/uL   CBC with platelets differential     Status: Abnormal    Narrative    The following orders were created for panel order CBC with platelets differential.  Procedure                               Abnormality         Status                     ---------                               -----------         ------                     CBC with platelets and d...[[id number]]  Abnormal            Final result                 Please view results  for these tests on the individual orders.     Medications   0.9% sodium chloride BOLUS (has no administration in time range)     Followed by   sodium chloride 0.9% infusion (has no administration in time range)   ondansetron (ZOFRAN) injection 4 mg (has no administration in time range)        Assessments & Plan (with Medical Decision Making)   Is a 21-year-old female with a history of type 1 diabetes who presents with nausea and vomiting.  She is also having elevated blood glucose.  On exam patient is somewhat uncomfortable.  She has abdominal tenderness.  Lab work shows ketones of 3.5 and lactic acid of 2.5.  Blood glucose is 414.  Potassium is 4.1.  Presentation is consistent with DKA.  Patient was given IV fluids, ondansetron and hydromorphone.  Patient was started on insulin. We will admit for further monitoring work-up and treatment.    I have reviewed the nursing notes. I have reviewed the findings, diagnosis, plan and need for follow up with the patient.    New Prescriptions    No medications on file       Final diagnoses:   None       --  Sourav Thompson DO  [facility] EMERGENCY DEPARTMENT  6/2/2022     Sourav Thompson DO  06/02/22 2630

## 2022-06-02 NOTE — ED TRIAGE NOTES
Triage Assessment     Row Name 06/02/22 2025       Triage Assessment (Adult)    Airway WDL WDL       Respiratory WDL    Respiratory WDL WDL       Skin Circulation/Temperature WDL    Skin Circulation/Temperature WDL WDL       Cardiac WDL    Cardiac WDL WDL       Peripheral/Neurovascular WDL    Peripheral Neurovascular WDL WDL       Cognitive/Neuro/Behavioral WDL    Cognitive/Neuro/Behavioral WDL WDL

## 2022-06-03 VITALS
SYSTOLIC BLOOD PRESSURE: 156 MMHG | RESPIRATION RATE: 18 BRPM | DIASTOLIC BLOOD PRESSURE: 93 MMHG | TEMPERATURE: 98.1 F | OXYGEN SATURATION: 100 % | HEART RATE: 83 BPM

## 2022-06-03 LAB
ALBUMIN SERPL-MCNC: 2.4 G/DL (ref 3.4–5)
ALP SERPL-CCNC: 107 U/L (ref 40–150)
ALT SERPL W P-5'-P-CCNC: 14 U/L (ref 0–50)
ANION GAP SERPL CALCULATED.3IONS-SCNC: 6 MMOL/L (ref 3–14)
AST SERPL W P-5'-P-CCNC: 11 U/L (ref 0–35)
BASOPHILS # BLD AUTO: 0 10E3/UL (ref 0–0.2)
BASOPHILS NFR BLD AUTO: 0 %
BILIRUB SERPL-MCNC: 0.2 MG/DL (ref 0.2–1.3)
BUN SERPL-MCNC: 9 MG/DL (ref 7–30)
C COLI+JEJUNI+LARI FUSA STL QL NAA+PROBE: NOT DETECTED
C DIFF TOX B STL QL: NEGATIVE
CA-I BLD-MCNC: 4.3 MG/DL (ref 4.4–5.2)
CALCIUM SERPL-MCNC: 7.5 MG/DL (ref 8.5–10.1)
CHLORIDE BLD-SCNC: 115 MMOL/L (ref 96–110)
CO2 SERPL-SCNC: 21 MMOL/L (ref 20–32)
CREAT SERPL-MCNC: 0.65 MG/DL (ref 0.5–1)
EC STX1 GENE STL QL NAA+PROBE: NOT DETECTED
EC STX2 GENE STL QL NAA+PROBE: NOT DETECTED
EOSINOPHIL # BLD AUTO: 0.1 10E3/UL (ref 0–0.7)
EOSINOPHIL NFR BLD AUTO: 1 %
ERYTHROCYTE [DISTWIDTH] IN BLOOD BY AUTOMATED COUNT: 13.6 % (ref 10–15)
GFR SERPL CREATININE-BSD FRML MDRD: >90 ML/MIN/1.73M2
GLUCOSE BLD-MCNC: 138 MG/DL (ref 70–99)
GLUCOSE BLDC GLUCOMTR-MCNC: 111 MG/DL (ref 70–99)
GLUCOSE BLDC GLUCOMTR-MCNC: 121 MG/DL (ref 70–99)
GLUCOSE BLDC GLUCOMTR-MCNC: 123 MG/DL (ref 70–99)
GLUCOSE BLDC GLUCOMTR-MCNC: 128 MG/DL (ref 70–99)
GLUCOSE BLDC GLUCOMTR-MCNC: 130 MG/DL (ref 70–99)
GLUCOSE BLDC GLUCOMTR-MCNC: 145 MG/DL (ref 70–99)
GLUCOSE BLDC GLUCOMTR-MCNC: 147 MG/DL (ref 70–99)
GLUCOSE BLDC GLUCOMTR-MCNC: 154 MG/DL (ref 70–99)
GLUCOSE BLDC GLUCOMTR-MCNC: 157 MG/DL (ref 70–99)
GLUCOSE BLDC GLUCOMTR-MCNC: 165 MG/DL (ref 70–99)
GLUCOSE BLDC GLUCOMTR-MCNC: 165 MG/DL (ref 70–99)
GLUCOSE BLDC GLUCOMTR-MCNC: 188 MG/DL (ref 70–99)
GLUCOSE BLDC GLUCOMTR-MCNC: 197 MG/DL (ref 70–99)
GLUCOSE BLDC GLUCOMTR-MCNC: 333 MG/DL (ref 70–99)
GLUCOSE BLDC GLUCOMTR-MCNC: 374 MG/DL (ref 70–99)
GLUCOSE BLDC GLUCOMTR-MCNC: 524 MG/DL (ref 70–99)
GLUCOSE BLDC GLUCOMTR-MCNC: 529 MG/DL (ref 70–99)
HCT VFR BLD AUTO: 28.6 % (ref 35–47)
HGB BLD-MCNC: 9.4 G/DL (ref 11.7–15.7)
IMM GRANULOCYTES # BLD: 0 10E3/UL
IMM GRANULOCYTES NFR BLD: 0 %
KETONES BLD-SCNC: 0.4 MMOL/L (ref 0–0.6)
LYMPHOCYTES # BLD AUTO: 2.1 10E3/UL (ref 0.8–5.3)
LYMPHOCYTES NFR BLD AUTO: 37 %
MCH RBC QN AUTO: 27.4 PG (ref 26.5–33)
MCHC RBC AUTO-ENTMCNC: 32.9 G/DL (ref 31.5–36.5)
MCV RBC AUTO: 83 FL (ref 78–100)
MONOCYTES # BLD AUTO: 0.5 10E3/UL (ref 0–1.3)
MONOCYTES NFR BLD AUTO: 9 %
NEUTROPHILS # BLD AUTO: 3 10E3/UL (ref 1.6–8.3)
NEUTROPHILS NFR BLD AUTO: 53 %
NOROV GI+II ORF1-ORF2 JNC STL QL NAA+PR: DETECTED
NRBC # BLD AUTO: 0 10E3/UL
NRBC BLD AUTO-RTO: 0 /100
PLATELET # BLD AUTO: 182 10E3/UL (ref 150–450)
POTASSIUM BLD-SCNC: 3.4 MMOL/L (ref 3.4–5.3)
PROT SERPL-MCNC: 5.8 G/DL (ref 6.8–8.8)
RBC # BLD AUTO: 3.43 10E6/UL (ref 3.8–5.2)
RVA NSP5 STL QL NAA+PROBE: NOT DETECTED
SALMONELLA SP RPOD STL QL NAA+PROBE: NOT DETECTED
SHIGELLA SP+EIEC IPAH STL QL NAA+PROBE: NOT DETECTED
SODIUM SERPL-SCNC: 142 MMOL/L (ref 133–144)
V CHOL+PARA RFBL+TRKH+TNAA STL QL NAA+PR: NOT DETECTED
WBC # BLD AUTO: 5.6 10E3/UL (ref 4–11)
Y ENTERO RECN STL QL NAA+PROBE: NOT DETECTED

## 2022-06-03 PROCEDURE — 80053 COMPREHEN METABOLIC PANEL: CPT | Performed by: INTERNAL MEDICINE

## 2022-06-03 PROCEDURE — 36415 COLL VENOUS BLD VENIPUNCTURE: CPT | Performed by: INTERNAL MEDICINE

## 2022-06-03 PROCEDURE — 87506 IADNA-DNA/RNA PROBE TQ 6-11: CPT | Performed by: INTERNAL MEDICINE

## 2022-06-03 PROCEDURE — 250N000013 HC RX MED GY IP 250 OP 250 PS 637: Performed by: INTERNAL MEDICINE

## 2022-06-03 PROCEDURE — 82330 ASSAY OF CALCIUM: CPT | Performed by: INTERNAL MEDICINE

## 2022-06-03 PROCEDURE — 99239 HOSP IP/OBS DSCHRG MGMT >30: CPT | Performed by: INTERNAL MEDICINE

## 2022-06-03 PROCEDURE — 85025 COMPLETE CBC W/AUTO DIFF WBC: CPT | Performed by: INTERNAL MEDICINE

## 2022-06-03 PROCEDURE — 250N000011 HC RX IP 250 OP 636: Performed by: INTERNAL MEDICINE

## 2022-06-03 PROCEDURE — 82010 KETONE BODYS QUAN: CPT | Performed by: INTERNAL MEDICINE

## 2022-06-03 PROCEDURE — 258N000003 HC RX IP 258 OP 636: Performed by: EMERGENCY MEDICINE

## 2022-06-03 PROCEDURE — 250N000012 HC RX MED GY IP 250 OP 636 PS 637: Performed by: NURSE PRACTITIONER

## 2022-06-03 PROCEDURE — 87493 C DIFF AMPLIFIED PROBE: CPT | Performed by: INTERNAL MEDICINE

## 2022-06-03 PROCEDURE — 99222 1ST HOSP IP/OBS MODERATE 55: CPT | Performed by: NURSE PRACTITIONER

## 2022-06-03 RX ORDER — NICOTINE POLACRILEX 4 MG
15-30 LOZENGE BUCCAL
Status: DISCONTINUED | OUTPATIENT
Start: 2022-06-03 | End: 2022-06-03

## 2022-06-03 RX ORDER — DEXTROSE MONOHYDRATE 25 G/50ML
25-50 INJECTION, SOLUTION INTRAVENOUS
Status: DISCONTINUED | OUTPATIENT
Start: 2022-06-03 | End: 2022-06-03

## 2022-06-03 RX ORDER — CALCIUM GLUCONATE 20 MG/ML
1 INJECTION, SOLUTION INTRAVENOUS ONCE
Status: COMPLETED | OUTPATIENT
Start: 2022-06-03 | End: 2022-06-03

## 2022-06-03 RX ORDER — SODIUM CHLORIDE 9 MG/ML
INJECTION, SOLUTION INTRAVENOUS CONTINUOUS
Status: DISCONTINUED | OUTPATIENT
Start: 2022-06-03 | End: 2022-06-03 | Stop reason: HOSPADM

## 2022-06-03 RX ADMIN — INSULIN ASPART 7 UNITS: 100 INJECTION, SOLUTION INTRAVENOUS; SUBCUTANEOUS at 16:26

## 2022-06-03 RX ADMIN — FAMOTIDINE 20 MG: 20 TABLET ORAL at 08:07

## 2022-06-03 RX ADMIN — CALCIUM GLUCONATE 1 G: 20 INJECTION, SOLUTION INTRAVENOUS at 16:35

## 2022-06-03 RX ADMIN — SODIUM CHLORIDE: 9 INJECTION, SOLUTION INTRAVENOUS at 04:33

## 2022-06-03 RX ADMIN — SODIUM CHLORIDE: 9 INJECTION, SOLUTION INTRAVENOUS at 17:53

## 2022-06-03 RX ADMIN — INSULIN GLARGINE 10 UNITS: 100 INJECTION, SOLUTION SUBCUTANEOUS at 14:30

## 2022-06-03 ASSESSMENT — ACTIVITIES OF DAILY LIVING (ADL)
ADLS_ACUITY_SCORE: 18
FALL_HISTORY_WITHIN_LAST_SIX_MONTHS: NO
WALKING_OR_CLIMBING_STAIRS_DIFFICULTY: NO
DOING_ERRANDS_INDEPENDENTLY_DIFFICULTY: NO
DRESSING/BATHING_DIFFICULTY: NO
ADLS_ACUITY_SCORE: 18
ADLS_ACUITY_SCORE: 35
ADLS_ACUITY_SCORE: 18
CONCENTRATING,_REMEMBERING_OR_MAKING_DECISIONS_DIFFICULTY: NO
TOILETING_ISSUES: NO
ADLS_ACUITY_SCORE: 18
ADLS_ACUITY_SCORE: 35
CHANGE_IN_FUNCTIONAL_STATUS_SINCE_ONSET_OF_CURRENT_ILLNESS/INJURY: NO
ADLS_ACUITY_SCORE: 18
ADLS_ACUITY_SCORE: 35
DIFFICULTY_EATING/SWALLOWING: NO
WEAR_GLASSES_OR_BLIND: NO

## 2022-06-03 NOTE — PLAN OF CARE
Up ad candida in room.VSS.Tele NSR.Denying any chest pain.  Voiding spontaneously in BR.  Loose,watery stool x1.Specimen sent to r/o c-diff,awaiting result.  States nausea getting better.Scope patch behind L ear.  Offered to ADAT diet and boxlunch but declined.  Tolerating fluid(water),a total of 350ml.  IVF NS at 125ml kept running.  Insulin drip overnight at Alg 1,with blood sugars in between 121-157.  Sleeping on and off.  Rechecks for labs this AM( ketones,K).

## 2022-06-03 NOTE — PHARMACY-ADMISSION MEDICATION HISTORY
Admission Medication History Completed by Pharmacy    See [facility] Admission Navigator for allergy information, preferred outpatient pharmacy, prior to admission medications and immunization status.     Medication History Sources:     Patient    Pharmacy fill history via Plored    Changes made to PTA medication list (reason):    Added: None    Deleted: famotidine (old rx)    Changed:   o lantus 14 units subcutaneous daily at noon --> 15 units subcutaneous daily at noon (per pt)    Additional Information:    Pt ran out of prenatal vitamins and vitamin B6. Wondering if she can get these refilled.    Prior to Admission medications    Medication Sig Last Dose Taking? Auth Provider   insulin aspart (NOVOLOG PEN) 100 UNIT/ML pen 1 unit / 10 gms of carbs with meals and snacks  Yes Jodie Delgado MD   insulin aspart (NOVOLOG PEN) 100 UNIT/ML pen Inject 1-5 Units Subcutaneous 4 times daily 6/2/2022 at Unknown time Yes Jodie Delgado MD   insulin aspart (NOVOLOG PEN) 100 UNIT/ML pen Inject 1-5 Units Subcutaneous At Bedtime  Yes Jodie Delgado MD   insulin glargine (BASAGLAR KWIKPEN) 100 UNIT/ML pen Inject 14 Units Subcutaneous daily At noon  Patient taking differently: Inject 15 Units Subcutaneous daily At noon  Yes Jodie Delgado MD   ondansetron (ZOFRAN) 4 MG tablet Take 1 tablet (4 mg) by mouth every 8 hours as needed for nausea 6/2/2022 at Unknown time Yes Jodie Delgado MD   acetone, Urine, test STRP 1 strip by In Vitro route as needed   Manisha Mcarthur MD   blood glucose monitoring (ACCU-CHEK FASTCLIX) lancets Use to test blood sugar 6 times daily or as directed.   Marcia Elizabeth MD   blood glucose monitoring (ACCU-CHEK HENRI SMARTVIEW) meter device kit Use to test blood sugar 6 times daily or as directed.   Marcia Elizabeth MD   blood glucose monitoring (ACCU-CHEK SMARTVIEW) test strip Use to test blood sugar 6 times daily or as  directed.   Marcia Elizabeth MD   Glucagon 0.5 MG/0.1ML SOSY Inject 1 ampule Subcutaneous daily as needed   Jodie Delgado MD   insulin pen needle (BD HENRI U/F) 32G X 4 MM Use 6 pen needles daily or as directed.   Marcia Elizabeth MD   Prenatal Vit-Fe Fumarate-FA (PRENATAL MULTIVITAMIN W/IRON) 27-0.8 MG tablet Take 1 tablet by mouth daily  Patient not taking: Reported on 6/3/2022 Not Taking at Unknown time  Jodie Delgado MD   pyridOXINE (VITAMIN B6) 25 MG tablet Take 1 tablet (25 mg) by mouth 3 times daily  Patient not taking: Reported on 6/3/2022 Not Taking at Unknown time  Jodie Delgado MD       Date completed: 06/03/22    Medication history completed by: Tiesha Villalba, [facility]

## 2022-06-03 NOTE — CONSULTS
Consulted by Ema Mars with Inpatient Diabetes Management Team to run a test claim for Dexcom Sensor and Transmitter.    Patient has pharmacy benefits through [employer] (pre-paid medical assistance plan). Per insurance, these products (and ) are all covered at a $0.00 copay.      Please feel free to contact me with any other test claims, prior authorizations, or insurance questions regarding outpatient medications.     Thanks!      Ariella Hutson [facility] Discharge [facility] Liaison  Pronouns: She/Her/Hers    [facility]  [address] [place] [address] [address], [place] [address]   [email or web address]  [email or web address]   Phone: [phone]  Pager: [phone]  Fax: [phone]

## 2022-06-03 NOTE — PLAN OF CARE
Alert and ox 4, VSS, On RA,  Insulin drip stopped around 1300. BG will be checked before meals with sliding scale insulin, carb counted.    Denied nausea, tolerated with fluid and food.     Denied chest pain, SOB, N/T.    Had loose stool this AM. C-diff was negative. Blood culture in progress.     Ketone 0.4 this AM.     Hemoglobin 9.4 this AM, paged to provider.    Lad called for Positive of Norovirus in stool PCR, Dr. Oneill notifies.     Continue with POC.

## 2022-06-03 NOTE — PROGRESS NOTES
[facility] - Internal Medicine Daily Note   Date of Service: 6/3/2022  Patient: Myriam Wylie  MRN: [id number]  Admission Date: 6/2/2022  Hospital Day # 1    Assessment & Plan      Myriam Wylie is a 21 year old female admitted on 6/2/2022. She has a known h/o uncontrolled Type 1 DM, with 1 month H./o intermittent vomiting and diarrhea  She presents today with exacerbation of symptoms, with lab evidence of diabetic ketoacidosis  She is admitted for initiation of insulin drip while her symptoms are being treated   Individual problems and their management are outlined below     Type 1 DM, Uncontrolled, A1C at 9.8  Diabetic ketoacidosis  Lactic acidosis  Serum Ketones at 3.5, Lactic acid at 2.5  Unknown cause of this episode of DKA. No evidence of infection  Blood culture X 2 ordered  Intermittent abdominal pain and diarrhea for almost 1 month. C Diff ordered ( given h/o C Diff in the past)  S/P 1 lts bolus      Patient was on insulin drip for diabetes type 1 with hyperglycemia and starvation ketoacidosis.  Patient was having nausea.  States she had a flulike illness for several weeks.  She believes that she is very well educated on her diabetes management.  Seen by endocrinology and consultation.  She does not have established follow-up with her endocrinologist and states she can usually see her within a week or 2 following discharge from hospital.  Patient was reluctant to stay 1 more day.  She was able to tolerate regular diet very well without any vomiting or diarrhea.  And her calcium level was low and patient was given 2 g of calcium gluconate  She was put on Lantus.  Received Lantus 10 units at around noon.  She was advised to be back on her regular insulin regimen for insulin doses to be adjusted as needed by her primary endocrinologist.        Intermittent nausea, resolved   This has been a long standing problem for over 1 month  For now, treat symptomatically with Zofran and scopolamine patch    Will probably need Op evaluation with Gastroenterology to evaluate for gastroparesis, cheyanne. With a history of Type 1 DM        Diarrhea:  Intermittent in nature  Given h/o C Diff in the past.  No any ongoing diarrhea.    Yana Oneill MD  Internal Medicine Staff Hospitalist   [facility]   Pager: [phone]    Team: Eloisa Currie   Page Cross Cover after 5 pm: pager [phone]   ___________________________________________________________________    Subjective & Interval Hx:    Patient is blood sugar level this afternoon was greater than 500.  She was given 10 units of Lantus.  She normally takes 15 units of Lantus at noon.  An additional 5 units of Lantus was given along with 7 units of NovoLog sliding.  She also received 2 g of calcium gluconate.  Patient is reluctant to stay until tomorrow for her blood sugars to normalize.  She does not have any fever.  Lungs are clear to auscultation abdomen is soft.      Last 24 hr care team notes reviewed.   ROS:  4 point ROS including Respiratory, CV, GI and , other than that noted in the HPI, is negative.    Medications: Reviewed in EPIC. List below for reference  Current Facility-Administered Medications   Medication     calcium gluconate 1 g in 50 mL sodium chloride intermittent infusion (premix)     dextrose 5% and 0.45% NaCl infusion     glucose gel 15-30 g    Or     dextrose 50 % injection 25-50 mL    Or     glucagon injection 1 mg     famotidine (PEPCID) tablet 20 mg     insulin aspart (NovoLOG) injection (RAPID ACTING)     insulin aspart (NovoLOG) injection (RAPID ACTING)     insulin aspart (NovoLOG) injection (RAPID ACTING)     insulin aspart (NovoLOG) injection (RAPID ACTING)     [START ON 6/4/2022] insulin glargine (LANTUS PEN) injection 15 Units     insulin glargine (LANTUS PEN) injection 5 Units     lidocaine (LMX4) cream     lidocaine 1 % 0.1-1 mL     Med Instruction - Transition from IV Insulin Infusion to Sub-Q Insulin     melatonin  tablet 1 mg     ondansetron (ZOFRAN) injection 4 mg     scopolamine (TRANSDERM) 72 hr patch 1 patch    And     scopolamine (TRANSDERM-SCOP) Patch in Place     sodium chloride (PF) 0.9% PF flush 3 mL     sodium chloride (PF) 0.9% PF flush 3 mL     sodium chloride 0.9% infusion     sodium chloride 0.9% infusion         Physical Exam:    /64 (BP Location: Right arm)   Pulse 86   Temp 98.5  F (36.9  C) (Oral)   Resp 16   Ht (P) 1.524 m (5')   Wt (P) 57.8 kg (127 lb 6.4 oz)   LMP  (LMP Unknown)   SpO2 100%   BMI (P) 24.88 kg/m       GENERAL: Alert and oriented x 3. NAD.   HEENT: Anicteric sclera. Mucous membranes moist.   CV: RRR. S1, S2. No murmurs appreciated.   RESPIRATORY: Effort normal on. Lungs CTAB with no wheezing, rales, rhonchi.   GI: Abdomen soft and non distended with normoactive bowel sounds present in all quadrants. No tenderness, rebound, guarding.   NEUROLOGICAL: No focal deficits. Moves all extremities.    EXTREMITIES: No peripheral edema. Intact bilateral pedal pulses.   SKIN: No jaundice. No rashes.     Labs & Studies of Note: I personally reviewed the following studies:  Last Comprehensive Metabolic Panel:  Sodium   Date Value Ref Range Status   06/03/2022 142 133 - 144 mmol/L Final   04/18/2021 139 133 - 144 mmol/L Final     Potassium   Date Value Ref Range Status   06/03/2022 3.4 3.4 - 5.3 mmol/L Final   04/18/2021 3.6 3.4 - 5.3 mmol/L Final     Chloride   Date Value Ref Range Status   06/03/2022 115 (H) 96 - 110 mmol/L Final   04/18/2021 114 (H) 96 - 110 mmol/L Final     Carbon Dioxide   Date Value Ref Range Status   04/18/2021 21 20 - 32 mmol/L Final     Carbon Dioxide (CO2)   Date Value Ref Range Status   06/03/2022 21 20 - 32 mmol/L Final     Anion Gap   Date Value Ref Range Status   06/03/2022 6 3 - 14 mmol/L Final   04/18/2021 4 3 - 14 mmol/L Final     Glucose   Date Value Ref Range Status   06/03/2022 138 (H) 70 - 99 mg/dL Final   04/18/2021 192 (H) 70 - 99 mg/dL Final      GLUCOSE BY METER POCT   Date Value Ref Range Status   06/03/2022 529 (HH) 70 - 99 mg/dL Final     Comment:     Dr/RN Notified     Urea Nitrogen   Date Value Ref Range Status   06/03/2022 9 7 - 30 mg/dL Final   04/18/2021 11 7 - 19 mg/dL Final     Creatinine   Date Value Ref Range Status   06/03/2022 0.65 0.50 - 1.00 mg/dL Final   04/18/2021 0.75 0.50 - 1.00 mg/dL Final     GFR Estimate   Date Value Ref Range Status   06/03/2022 >90 >60 mL/min/1.73m2 Final     Comment:     Effective December 21, 2021 eGFRcr in adults is calculated using the 2021 CKD-EPI creatinine equation which includes age and gender (Daniel et al., NE, DOI: 10.1056/QGUNce1978067)   04/18/2021 >90 >60 mL/min/[1.73_m2] Final     Comment:     Non  GFR Calc  Starting 12/18/2018, serum creatinine based estimated GFR (eGFR) will be   calculated using the Chronic Kidney Disease Epidemiology Collaboration   (CKD-EPI) equation.       Calcium   Date Value Ref Range Status   06/03/2022 7.5 (L) 8.5 - 10.1 mg/dL Final   04/18/2021 7.7 (L) 8.5 - 10.1 mg/dL Final     Bilirubin Total   Date Value Ref Range Status   06/03/2022 0.2 0.2 - 1.3 mg/dL Final   04/18/2021 0.4 0.2 - 1.3 mg/dL Final     Alkaline Phosphatase   Date Value Ref Range Status   06/03/2022 107 40 - 150 U/L Final   04/18/2021 131 40 - 150 U/L Final     ALT   Date Value Ref Range Status   06/03/2022 14 0 - 50 U/L Final   04/18/2021 9 0 - 50 U/L Final     AST   Date Value Ref Range Status   06/03/2022 11 0 - 35 U/L Final   04/18/2021 8 0 - 35 U/L Final     Lab Results   Component Value Date    WBC 5.6 06/03/2022    WBC 7.6 04/18/2021     Lab Results   Component Value Date    RBC 3.43 06/03/2022    RBC 3.57 04/18/2021     Lab Results   Component Value Date    HGB 9.4 06/03/2022    HGB 9.3 04/18/2021     Lab Results   Component Value Date    HCT 28.6 06/03/2022    HCT 31.1 04/18/2021     No components found for: MCT  Lab Results   Component Value Date    MCV 83 06/03/2022    MCV  87 04/18/2021     Lab Results   Component Value Date    MCH 27.4 06/03/2022    MCH 26.1 04/18/2021     Lab Results   Component Value Date    MCHC 32.9 06/03/2022    MCHC 29.9 04/18/2021     Lab Results   Component Value Date    RDW 13.6 06/03/2022    RDW 14.8 04/18/2021     Lab Results   Component Value Date     06/03/2022     04/18/2021

## 2022-06-03 NOTE — ED NOTES
[facility]   ED Nurse to Floor Handoff     Myriam Wylie is a 21 year old female who speaks English and lives with a spouse,  in a home  They arrived in the ED by ambulance from emergency room    ED Chief Complaint: Nausea & Vomiting    ED Dx;   Final diagnoses:   Diabetic ketoacidosis without coma associated with type 1 diabetes mellitus (H)         Needed?: No    Allergies: No Known Allergies.  Past Medical Hx:   Past Medical History:   Diagnosis Date     C. difficile colitis      Diabetes type 1, uncontrolled (H)      DKA (diabetic ketoacidosis) (H)      PID (acute pelvic inflammatory disease)       Baseline Mental status: WDL and mild dementia  Current Mental Status changes: at basesline    Infection present or suspected this encounter: no  Sepsis suspected: No  Isolation type: No active isolations  Patient tested for COVID 19 prior to admission: YES     Activity level - Baseline/Home:  Independent  Activity Level - Current:   Independent    Bariatric equipment needed?: No    In the ED these meds were given:   Medications   0.9% sodium chloride BOLUS (0 mLs Intravenous Stopped 6/2/22 2006)     Followed by   sodium chloride 0.9% infusion ( Intravenous New Bag 6/2/22 2007)   ondansetron (ZOFRAN) injection 4 mg (4 mg Intravenous Given 6/2/22 1758)   dextrose 5% and 0.45% NaCl infusion (has no administration in time range)   dextrose 50 % injection 25-50 mL (has no administration in time range)   insulin 1 unit/1 mL in NS (NovoLIN, HumuLIN Regular) drip -ED DKA algorithm ( Intravenous New Bag 6/2/22 2102)   scopolamine (TRANSDERM) 72 hr patch 1 patch (1 patch Transdermal Patch/Med Applied 6/2/22 2150)     And   scopolamine (TRANSDERM-SCOP) Patch in Place (has no administration in time range)   glucose gel 15-30 g (has no administration in time range)     Or   dextrose 50 % injection 25-50 mL (has no administration in time range)     Or   glucagon injection  1 mg (has no administration in time range)   insulin aspart (NovoLOG) injection (RAPID ACTING) (has no administration in time range)   HYDROmorphone (PF) (DILAUDID) injection 0.5 mg (0.5 mg Intravenous Given 6/2/22 3994)       Drips running?  Yes    Home pump  No    Current LDAs  Peripheral IV 06/02/22 Anterior;Right Upper forearm (Active)   Site Assessment [facility] 06/02/22 1618   Number of days: 0       Peripheral IV 06/02/22 Left Hand (Active)   Site Assessment [facility] 06/02/22 2021   Line Status Saline locked 06/02/22 2021   Dressing Intervention New dressing  06/02/22 2021   Number of days: 0       Labs results:   Labs Ordered and Resulted from Time of ED Arrival to Time of ED Departure   COMPREHENSIVE METABOLIC PANEL - Abnormal       Result Value    Sodium 137      Potassium 5.9 (*)     Chloride 105      Carbon Dioxide (CO2) 20      Anion Gap 12      Urea Nitrogen 15      Creatinine 0.73      Calcium 8.8      Glucose 414 (*)     Alkaline Phosphatase 146      AST        ALT 22      Protein Total 8.2      Albumin 3.3 (*)     Bilirubin Total 0.6      GFR Estimate >90     LACTIC ACID WHOLE BLOOD - Abnormal    Lactic Acid 2.5 (*)    ROUTINE UA WITH MICROSCOPIC REFLEX TO CULTURE - Abnormal    Color Urine Straw      Appearance Urine Clear      Glucose Urine >=1000 (*)     Bilirubin Urine Negative      Ketones Urine 80  (*)     Specific Gravity Urine 1.031      Blood Urine Negative      pH Urine 6.0      Protein Albumin Urine Negative      Urobilinogen Urine Normal      Nitrite Urine Negative      Leukocyte Esterase Urine Negative      Mucus Urine Present (*)     RBC Urine 1      WBC Urine <1      Squamous Epithelials Urine 3 (*)    KETONE BETA-HYDROXYBUTYRATE QUANTITATIVE, RAPID - Abnormal    Ketone (Beta-Hydroxybutyrate) Quantitative 3.5 (*)    CBC WITH PLATELETS AND DIFFERENTIAL - Abnormal    WBC Count 7.4      RBC Count 4.60      Hemoglobin 12.5      Hematocrit 38.8      MCV 84      MCH 27.2      MCHC 32.2      RDW 13.4       Platelet Count 216      % Neutrophils 88      % Lymphocytes 9      % Monocytes 3      % Eosinophils 0      % Basophils 0      % Immature Granulocytes 0      NRBCs per 100 WBC 0      Absolute Neutrophils 6.5      Absolute Lymphocytes 0.7 (*)     Absolute Monocytes 0.2      Absolute Eosinophils 0.0      Absolute Basophils 0.0      Absolute Immature Granulocytes 0.0      Absolute NRBCs 0.0     HEMOGLOBIN A1C - Abnormal    Hemoglobin A1C 9.8 (*)    GLUCOSE BY METER - Abnormal    GLUCOSE BY METER POCT 308 (*)    HCG QUALITATIVE URINE - Normal    hCG Urine Qualitative Negative     POTASSIUM - Normal    Potassium 4.1     COVID-19 VIRUS (CORONAVIRUS) BY PCR - Normal    SARS CoV2 PCR Negative     LACTIC ACID WHOLE BLOOD - Normal    Lactic Acid 1.5     GLUCOSE MONITOR NURSING POCT   CLOSTRIDIUM DIFFICILE TOXIN B   BLOOD CULTURE   BLOOD CULTURE   ENTERIC BACTERIA AND VIRUS PANEL BY MARIZA STOOL       Imaging Studies: No results found for this or any previous visit (from the past 24 hour(s)).    Recent vital signs:   /61   Pulse 89   Temp 98.1  F (36.7  C) (Oral)   Resp 18   LMP  (LMP Unknown)   SpO2 100%     [place] Coma Scale Score: 15 (06/02/22 2152)       Cardiac Rhythm: Normal Sinus  Pt needs tele? Yes  Skin/wound Issues: None    Code Status: Full Code    Pain control: good    Nausea control: good    Abnormal labs/tests/findings requiring intervention: ketones 3.5    Family present during ED course? Yes   Family Comments/Social Situation comments:     Tasks needing completion: Stool exam    Mukul Ortiz, RN  ascom --   [phone] [place] [facility]  1947 [facility] ED

## 2022-06-03 NOTE — DISCHARGE SUMMARY
Medicine Discharge Summary       Myriam Wylie MRN# [id number]   YOB: 2000 Age: 21 year old     Date of Admission:  6/2/2022  Date of Discharge:  6/3/2022  Admitting Physician:  Jodie Sellers MD  Discharge Physician:  Sweta Hernández  Discharging Service:  Hospital Medicine     Primary Provider: No Ref-Primary, Physician              Discharge Diagnosis:            Consultations:   Endocrinology         Hospital Course       Myriam Wylie is a 21 year old female admitted on 6/2/2022. She has a known h/o uncontrolled Type 1 DM, with 1 month H./o intermittent vomiting and diarrhea  She presents today with exacerbation of symptoms, with lab evidence of diabetic ketoacidosis  She is admitted for initiation of insulin drip while her symptoms are being treated   Individual problems and their management are outlined below     Type 1 DM, Uncontrolled, A1C at 9.8  Diabetic ketoacidosis  Lactic acidosis  Serum Ketones at 3.5, Lactic acid at 2.5  Unknown cause of this episode of DKA. No evidence of infection  Blood culture X 2 ordered  Intermittent abdominal pain and diarrhea for almost 1 month. C Diff ordered ( given h/o C Diff in the past)  S/P 1 lts bolus     Patient was on insulin drip for diabetes type 1 with hyperglycemia and starvation ketoacidosis.  Patient was having nausea.  States she had a flulike illness for several weeks.  She believes that she is very well educated on her diabetes management.  Seen by endocrinology and consultation.  She does not have established follow-up with her endocrinologist and states she can usually see her within a week or 2 following discharge from hospital.  Patient was reluctant to stay 1 more day.  She was able to tolerate regular diet very well without any vomiting or diarrhea.  And her calcium level was low and patient was given 2 g of calcium gluconate  She was put on Lantus.  Received Lantus 10 units at around noon.  She was advised to be back  on her regular insulin regimen for insulin doses to be adjusted as needed by her primary endocrinologist.       Intermittent nausea, resolved   This has been a long standing problem for over 1 month  For now, treat symptomatically with Zofran and scopolamine patch   Will probably need Op evaluation with Gastroenterology to evaluate for gastroparesis, cheyanne. With a history of Type 1 DM        Diarrhea:  Intermittent in nature  Given h/o C Diff in the past.  No any ongoing diarrhea.      On Exam ;   Alert and oriented . No acute distress  Vital signs:  Temp: 98.5  F (36.9  C) Temp src: Oral BP: 129/64 Pulse: 86   Resp: 16 SpO2: 100 % O2 Device: None (Room air)   Height: (P) 152.4 cm (5') Weight: (P) 57.8 kg (127 lb 6.4 oz)  Estimated body mass index is 24.88 kg/m  (pended) as calculated from the following:    Height as of this encounter: (P) 1.524 m (5').    Weight as of this encounter: (P) 57.8 kg (127 lb 6.4 oz).  Neck ; supple , no JVD  Chest ; equal BS bilaterally , no rales or rhonchi   CVS; RRR, no murmur /rubs or gallops  GI ; soft abdomen, non tender, BS positive  Ext ; no edema , no cynosis  Neuro ; CN 2 to 12 grossly intact , No Facial asymmetry noticed  .  Psych ; appropriate mood and effect  Skin ; no rash or purpura on exposed skin     ROUTINE IP LABS (Last four results)  BMPRecent Labs   Lab 06/03/22  1214 06/03/22  1058 06/03/22  1009 06/03/22  0859 06/03/22  0653 06/03/22  0652 06/02/22  2020 06/02/22  1849 06/02/22  1646   NA  --   --   --   --   --  142  --   --  137   POTASSIUM  --   --   --   --   --  3.4  --  4.1 5.9*   CHLORIDE  --   --   --   --   --  115*  --   --  105   LILIANA  --   --   --   --   --  7.5*  --   --  8.8   CO2  --   --   --   --   --  21  --   --  20   BUN  --   --   --   --   --  9  --   --  15   CR  --   --   --   --   --  0.65  --   --  0.73   * 111* 130* 147*   < > 138*   < >  --  414*    < > = values in this interval not displayed.     CBC  Recent Labs   Lab  06/03/22  0652 06/02/22  1646   WBC 5.6 7.4   RBC 3.43* 4.60   HGB 9.4* 12.5   HCT 28.6* 38.8   MCV 83 84   MCH 27.4 27.2   MCHC 32.9 32.2   RDW 13.6 13.4    216     INRNo lab results found in last 7 days.                 Discharge Medications:     Current Discharge Medication List      CONTINUE these medications which have NOT CHANGED    Details   !! insulin aspart (NOVOLOG PEN) 100 UNIT/ML pen 1 unit / 10 gms of carbs with meals and snacks  Qty: 15 mL    Associated Diagnoses: Type 1 diabetes mellitus with hyperglycemia (H)      !! insulin aspart (NOVOLOG PEN) 100 UNIT/ML pen Inject 1-5 Units Subcutaneous 4 times daily  Qty: 15 mL    Comments: ISF 50  1 per 50 > 100 - before meals      For  - 150 give 1 unit.   For - 200 give 2 units.   For - 250 give 3 units.   For  - 300 give 4 units.   For BG = or > 301 give 5 units.     Associated Diagnoses: Type 1 diabetes mellitus with hyperglycemia (H)      !! insulin aspart (NOVOLOG PEN) 100 UNIT/ML pen Inject 1-5 Units Subcutaneous At Bedtime  Qty: 15 mL, Refills: 0    Comments: ISF 50  1 per 50 > 120 - before bed  For  - 169 give 1 unit.   For  - 219 give 2 units.   For  - 269 give 3 units.   For  - 319 give 4 units.   For BG = or > 320 give 5 units.        Associated Diagnoses: Type 1 diabetes mellitus with hyperglycemia (H)      insulin glargine (BASAGLAR KWIKPEN) 100 UNIT/ML pen Inject 14 Units Subcutaneous daily At noon  Qty: 15 mL, Refills: 0    Comments: If Basaglar is not covered by insurance, may substitute Lantus at same dose and frequency.    Associated Diagnoses: Type 1 diabetes mellitus with hyperglycemia (H)      ondansetron (ZOFRAN) 4 MG tablet Take 1 tablet (4 mg) by mouth every 8 hours as needed for nausea  Qty: 30 tablet, Refills: 0    Associated Diagnoses: Nausea and vomiting, intractability of vomiting not specified, unspecified vomiting type      acetone, Urine, test STRP 1 strip by In Vitro route  as needed  Qty: 50 each, Refills: 1      blood glucose monitoring (ACCU-CHEK FASTCLIX) lancets Use to test blood sugar 6 times daily or as directed.  Qty: 2 Box, Refills: 6    Associated Diagnoses: Type 1 diabetes mellitus with hyperglycemia (H)      blood glucose monitoring (ACCU-CHEK HENRI SMARTVIEW) meter device kit Use to test blood sugar 6 times daily or as directed.  Qty: 2 kit, Refills: 6    Comments: 1 kit home and 1 kit school  Associated Diagnoses: Type 1 diabetes mellitus with hyperglycemia (H)      blood glucose monitoring (ACCU-CHEK SMARTVIEW) test strip Use to test blood sugar 6 times daily or as directed.  Qty: 200 each, Refills: 6    Associated Diagnoses: Type 1 diabetes mellitus with hyperglycemia (H)      Glucagon 0.5 MG/0.1ML SOSY Inject 1 ampule Subcutaneous daily as needed  Qty: 1 mL, Refills: 0    Associated Diagnoses: Type 1 diabetes mellitus with hyperglycemia (H)      insulin pen needle (BD HENRI U/F) 32G X 4 MM Use 6 pen needles daily or as directed.  Qty: 200 each, Refills: 6    Associated Diagnoses: Type 1 diabetes mellitus with hyperglycemia (H)      Prenatal Vit-Fe Fumarate-FA (PRENATAL MULTIVITAMIN W/IRON) 27-0.8 MG tablet Take 1 tablet by mouth daily  Qty: 90 tablet, Refills: 3    Associated Diagnoses: Hyperemesis gravidarum      pyridOXINE (VITAMIN B6) 25 MG tablet Take 1 tablet (25 mg) by mouth 3 times daily  Qty: 90 tablet, Refills: 0    Associated Diagnoses: Hyperemesis gravidarum       !! - Potential duplicate medications found. Please discuss with provider.      STOP taking these medications       famotidine (PEPCID) 20 MG tablet Comments:   Reason for Stopping:                    Discharge Instructions and Follow-Up:     Discharge Procedure Orders   Reason for your hospital stay   Order Comments: Diabetes type 1 with hyerglycemia and starvation ketoses   Hypocalcemia  Advised follow up with her primary endocrinologist in 1-2 weeks     Activity   Order Comments: Your activity upon  discharge: activity as tolerated     Order Specific Question Answer Comments   Is discharge order? Yes      Adult [facility] Follow-up and recommended labs and tests   Order Comments: Follow up with Dr. Padmini HERNANDEZ , her primary endocrinologist in 1-2 weeks  Appointments on [facility]/or [facility] (with [facility] provider or service). Call [phone] if you haven't heard regarding these appointments within 7 days of discharge.     Diet   Order Comments: Follow this diet upon discharge: Orders Placed This Encounter      Consistent Carbohydrate Diet Moderate Consistent Carb (60 g CHO per Meal) Diet     Order Specific Question Answer Comments   Is discharge order? Yes          Reason for your hospital stay    Diabetes type 1 with hyerglycemia and starvation ketoses   Hypocalcemia  Advised follow up with her primary endocrinologist in 1-2 weeks     Activity    Your activity upon discharge: activity as tolerated     Adult [facility] Follow-up and recommended labs and tests    Follow up with Dr. Padmini HERNANDEZ , her primary endocrinologist in 1-2 weeks  Appointments on [facility]/or [facility] (with [facility] or [facility] provider or service). Call [phone] if you haven't heard regarding these appointments within 7 days of discharge.     Diet    Follow this diet upon discharge: Orders Placed This Encounter      Consistent Carbohydrate Diet Moderate Consistent Carb (60 g CHO per Meal) Diet                Discharge Disposition:   32 minutes spent in discharge, including >50% in counseling and coordination of care, medication review and plan of care recommended on follow up. Questions were answered to satisfaction       Yana Oneill MD  Internal Medicine Hospitalist & Staff Physician  [facility]

## 2022-06-03 NOTE — CONSULTS
NEW INPATIENT DIABETES MANAGEMENT CONSULT  Myriam Wylie  Age: 21 year old  MRN # [id number]   YOB: 2000    Chief Complaint: DKA  Reason for Consult: DKA  Consulting Provider: Jodie Delgado MD    History of Present Illness: Myriam Wylie is a 21 year old female with a history of type 1 diabetes who presented to the ED with nausea and vomiting.  Patient states that she has had intermittent nausea and vomiting for the past month but it became more severe in the past 2 days. Serum ketones 3.5, lactic acid 2.5.  Anion gap 12, bicarb 20.  She was started on DKA IV insulin gtt protocol.  BG trend:        Labs this AM: serum ketones 0.4, GFR>90.  Anion gap 6.  WBC 5.6.  Hgb 9.4. C.diff negative.     Ms. Myriam Wylie is a 21 year old diagnosed with T1DM 3/28/2005 at approximately 2.5 years of age.  She is known to have hx of several (>10-15) DKA episodes per recollection.  Myriam is followed by Dr. Padmini Hamilton of  Pediatric Endocrinology and was last seen 11/18/2021, A1c 8.8%.  Prior to that visit was May 2020 via telehealth and prior in-person clinic appointment was 10/2019.  She was seen by IDS on 1/2/22-at that time she was 8 weeks pregnant presenting with nausea, vomiting.  She miscarried later in January 2022.       Clarice uses CDI and dexcom for diabetes control, she has tried an amb pump in past when she was much younger but this did not work and would become disconnected easily in her sleep resulting in DKA episodes.  Confirmed per chart review when she was seeing SAMANTHA Singh and DRAKE Rayo of [facility] she was using an amb pump - model not clearly indicated.      She prefers MDI/dexcom. She is not wearing Dexcom now, states she needs a new transmitter and sensors.  Has back-up glucometer.      This AM, she is feeling better.  Denies nausea, vomiting.  Diarrhea is better.  She does admit to not feeling well for about a month.  But then prior to admission she started  "feeling significantly worse.  She last took her Lantus at 12pm yesterday.  She states she is compliant with all of her insulin.  Thinks maybe this episode was exacerbated by food poisoning, but not sure.     Other Active Medical Problems: nausea, vomiting  Diabetes Mellitus Type: 1  Duration:  Diagnosed at age 2.5 years in March 2005  Diabetic Complications: no retinopathy, last dilated eye exam 3/2021, denies peripheral neuropathy, no nephropathy  Prior to Admission Diabetes Regimen:    Lantus 15 units daily  Novolog 1:10g CHO coverage  Novolog medium resistance sliding scale insulin AC, HS (1/50>150)      BG monitor: Dexcom CGM  Frequency of checks: 3-4 times daily  \"it varies, changes every day- I can't say what it has been\"---not always wearing CGM, but does have glucometer as back up  Diet: no restrictions  Bf: rarely eats  Lunch: snacks usually only - yogurt/chips, no big meals  Dinner: varies - prefers chicken/beef, few vegetables, noodles/rice  Snacks: will chew apples or grapes and spit them out, fruit snack packs/crackers, PB/J or cheese crackers  Beverages: water, sometimes juice  Usual BG control PTA:  uncontrolled, with A1c 9.8 on 6/2/22  History of DKA: yes, >10 episodes  Able to Detect Hypoglycemia: yes - uses Dexcom which helps ID lows especially at [facility]  Usual Diabetes Care Provider: Dr. Padmini Givens,  Peds Endocrinology  Primary Care Provider: Physician No Ref-Primary  Factors Impacting IP Glucose Control: nausea, vomiting  Current Diet: regular diet    10 point ROS completed with pertinent positives and negatives noted in the HPI  Past medical, family and social histories are reviewed and updated.    Social History    Tobacco: denies    Alcohol: denies    Illicit drugs: marijuana use    Marital Status: single    Place of Residence: [place], [place]    Occupation:  Works at [employer]    Family History   no family history of DM    Physical Exam   /64 (BP Location: Right arm)   Pulse 86   Temp " 98.5  F (36.9  C) (Oral)   Resp 16   LMP  (LMP Unknown)   SpO2 100%   General: pleasant, in no distress.  Sitting up in bed.   HEENT: normocephalic, atraumatic. Oral mucous membranes moist.   Lungs: unlabored respiration, no cough  ABD: rounded, nondistended  Skin: warm and dry, no obvious lesions  MSK:  moves all extremities  Lymp:  no LE edema   Mental status:  alert, oriented to self, place, time  Psych:   calm and appropriate interaction     Most Recent Laboratory Tests:  Recent Labs   Lab 06/03/22  0652   HGB 9.4*     Recent Labs   Lab 06/02/22  1646   A1C 9.8*     Recent Labs   Lab 06/03/22  0652   CR 0.65     Recent Labs   Lab 06/03/22  0653 06/03/22  0652 06/03/22  0555 06/03/22  0436 06/03/22  0339 06/03/22  0234   * 138* 157* 121* 154* 128*       Assessment:   1) Uncontrolled DM type 1 (A1C 9.8) presenting with non anion gap ketosis (starvation?due to nausea, vomiting)  2) nausea, vomiting    Plan:    -Lantus 10 units daily, give now, stop IV insulin gtt 2 hours after Lantus given (this will likely need to be increased once she is eating more)   -Novolog 1:15g CHO coverage cover all PO intake   -start Novolog medium resistance sliding scale AC, HS   -BG monitoring TID AC, HS, 0200   -hypoglycemia protocol   -recommend diet as tolerated with carb counting protocol   -pharmacy liaison consult for Dexcom transmitter and sensor coverage   -on discharge, will recommend outpatient follow up with Dr. Padmini Givens,  Peds Endocrinology    Discussed plan of care with patient, nursing, and primary team.  Thank you for this consult; Inpatient Diabetes will continue to follow.     To contact Endocrine Diabetes service:   From 8AM-4PM: page inpatient diabetes provider that is following the patient  For questions or updates from 4PM-8AM: page the diabetes job code for on call fellow: [phone]      80 minutes spent on the date of the encounter doing chart review, history and exam, documentation and further  activities per the note      Over 50% of my time on the unit was spent counseling the patient and/or coordinating care regarding acute hyperglycemia management.  See note for details.    RASHID Davis, CNP  Inpatient Diabetes Management Service  Pager [phone]

## 2022-06-03 NOTE — PROGRESS NOTES
PT BG trending down,  @ 18:32, Dr. López paged inquiring for any action advised, one time dose of 3 U of insulin prescribed.

## 2022-06-03 NOTE — PLAN OF CARE
Pt A&Ox4, VSS. On insulin dtt, [place] [address] was 238, blood sugar was previously at 2100 308. Insulin dtt was 4units, brought down now to 2.5 units/hr at 2210, now 1unit/hr at 2310 for BG of 163. Algorithm 1 in use. Denies pain, SOB, chest pain, dizziness, sweatiness, headache, n/t. Pt has some nausea still, scopolamine patch on.  Settled pt into bed, able to make needs known, independent in room. Stool sample needs to be collected, double collecting hats in toilet when pt goes next. Pt is carb count, no food eaten on shift. Continue POC.

## 2022-06-03 NOTE — H&P
[facility]    History and Physical - Hospitalist Service, GOLD TEAM        Date of Admission:  6/2/2022    Assessment & Plan      Myriam Wylie is a 21 year old female admitted on 6/2/2022. She has a known h/o uncontrolled Type 1 DM, with 1 month H./o intermittent vomiting and diarrhea  She presents today with exacerbation of symptoms, with lab evidence of diabetic ketoacidosis  She is admitted for initiation of insulin drip while her symptoms are being treated   Individual problems and their management are outlined below    Type 1 DM, Uncontrolled, A1C at 9.8  Diabetic ketoacidosis  Lactic acidosis  Serum Ketones at 3.5, Lactic acid at 2.5  Unknown cause of this episode of DKA. No evidence of infection  Blood culture X 2 ordered  Intermittent abdominal pain and diarrhea for almost 1 month. C Diff ordered ( given h/o C Diff in the past)  S/P 1 lts bolus   Patient has been initiated on insulin drip protocol. Will order carb coverage with 1 unit for every 10 gms in case she is able to eat.  Recheck labs in the morning   Endocrinology team to evaluate patient in the morning      Intermittent nausea   This has been a long standing problem for over 1 month  For now, treat symptomatically with Zofran and scopolamine patch   Will probably need Op evaluation with Gastroenterology to evaluate for gastroparesis, cheyanne. With a history of Type 1 DM      Diarrhea:  Intermittent in nature  Given h/o C Diff in the past, will re-test for C Diff and enteric panel  Again, if continues, may need OP gastroenterology evaluation               Diet:  Regular adult   DVT Prophylaxis: Pneumatic Compression Devices  Martines Catheter: Not present  Central Lines: None  Cardiac Monitoring: None  Code Status:  Full     Clinically Significant Risk Factors Present on Admission             # Hypoalbuminemia: Albumin = 3.3 g/dL (Ref range: 3.4 - 5.0 g/dL) on admission, will monitor as appropriate           Disposition Plan   Expected Discharge: 06/04/2022   Anticipated discharge location:  Awaiting care coordination huddle         The patient's care was discussed with the Bedside Nurse and Patient.    Marlo Sullivan MD  Hospitalist Service, [facility]  Securely message with the Vocera Web Console (learn more here)  Text page via [place] Paging/Directory   Please see signed in provider for up to date coverage information      ______________________________________________________________________    Chief Complaint   Nausea and abdominal pain     History is obtained from the patient and chart review     History of Present Illness   Myriam Wylie is a 21 year old female with a history of type 1 diabetes who presents with nausea and vomiting.  Patient states that she has had intermittent nausea and vomiting for the past month but it became more severe in the past 2 days.  She also notes diarrhea with this.  She has abdominal pain and has had very little p.o. intake.  She states this does feel similar to when she has had DKA in the past.  No fever chills or infectious symptoms.  States since that started her blood glucose has been running high.  No other symptoms noted.     She last took her insulin at 11 am this morning. Says that she is compliant with insulin use   denies chest pain or SOB  Says that she has a h/o C Diff diarrhea as well     Review of Systems    The 10 point Review of Systems is negative other than noted in the HPI or here.     Past Medical History    I have reviewed this patient's medical history and updated it with pertinent information if needed.   Past Medical History:   Diagnosis Date     C. difficile colitis      Diabetes type 1, uncontrolled (H)      DKA (diabetic ketoacidosis) (H)      PID (acute pelvic inflammatory disease)        Past Surgical History   I have reviewed this patient's surgical history and updated it  with pertinent information if needed.  Past Surgical History:   Procedure Laterality Date     COLONOSCOPY N/A 2019    Procedure: COLONOSCOPY;  Surgeon: Jeremi Banks MD;  Location: [facility]      ESOPHAGOSCOPY, GASTROSCOPY, DUODENOSCOPY (EGD), COMBINED N/A 2019    Procedure: Upper endoscopy and colonoscopy with biopsy;  Surgeon: Jeremi Banks MD;  Location: [facility]        Social History   I have reviewed this patient's social history and updated it with pertinent information if needed.  Social History     Tobacco Use     Smoking status: Never Smoker     Smokeless tobacco: Never Used   Substance Use Topics     Alcohol use: Not Currently     Drug use: No       Family History     Family history reviewed and non contributory     Prior to Admission Medications   Prior to Admission Medications   Prescriptions Last Dose Informant Patient Reported? Taking?   Glucagon 0.5 MG/0.1ML SOSY   No No   Sig: Inject 1 ampule Subcutaneous daily as needed   Prenatal Vit-Fe Fumarate-FA (PRENATAL MULTIVITAMIN W/IRON) 27-0.8 MG tablet   No No   Sig: Take 1 tablet by mouth daily   acetone, Urine, test STRP   No No   Si strip by In Vitro route as needed   blood glucose monitoring (ACCU-CHEK FASTCLIX) lancets   No No   Sig: Use to test blood sugar 6 times daily or as directed.   blood glucose monitoring (ACCU-CHEK HENRI SMARTVIEW) meter device kit   No No   Sig: Use to test blood sugar 6 times daily or as directed.   blood glucose monitoring (ACCU-CHEK SMARTVIEW) test strip   No No   Sig: Use to test blood sugar 6 times daily or as directed.   famotidine (PEPCID) 20 MG tablet   No No   Sig: Take 1 tablet (20 mg) by mouth 2 times daily   insulin aspart (NOVOLOG PEN) 100 UNIT/ML pen   No No   Si unit / 10 gms of carbs with meals and snacks   insulin aspart (NOVOLOG PEN) 100 UNIT/ML pen 2022 at Unknown time  No Yes   Sig: Inject 1-5 Units Subcutaneous 4 times daily   insulin aspart (NOVOLOG PEN) 100 UNIT/ML  pen   No No   Sig: Inject 1-5 Units Subcutaneous At Bedtime   insulin glargine (BASAGLAR KWIKPEN) 100 UNIT/ML pen   No No   Sig: Inject 14 Units Subcutaneous daily At noon   insulin pen needle (BD HERNI U/F) 32G X 4 MM   No No   Sig: Use 6 pen needles daily or as directed.   ondansetron (ZOFRAN) 4 MG tablet 6/2/2022 at Unknown time  No Yes   Sig: Take 1 tablet (4 mg) by mouth every 8 hours as needed for nausea   pyridOXINE (VITAMIN B6) 25 MG tablet   No No   Sig: Take 1 tablet (25 mg) by mouth 3 times daily      Facility-Administered Medications: None     Allergies   No Known Allergies    Physical Exam   Vital Signs: Temp: 98.1  F (36.7  C) Temp src: Oral BP: 112/61 Pulse: 89   Resp: 18 SpO2: 100 % O2 Device: None (Room air)    Weight: 0 lbs 0 oz    Constitutional: awake, alert, cooperative, no apparent distress, and appears stated age  Eyes: Lids and lashes normal, pupils equal, round and reactive to light, extra ocular muscles intact, sclera clear, conjunctiva normal  ENT: Normocephalic, without obvious abnormality, atraumatic, sinuses nontender on palpation, external ears without lesions, oral pharynx with moist mucous membranes  Respiratory: No increased work of breathing, good air exchange, clear to auscultation bilaterally, no crackles or wheezing  Cardiovascular: Normal apical impulse, regular rate and rhythm, normal S1 and S2, no S3 or S4, and no murmur noted  GI: Normal bowel sounds, soft, non-distended, discomfort on deep palpation, no masses palpated, no hepatosplenomegally  Skin: no bruising or bleeding  Musculoskeletal: no lower extremity pitting edema present  Neurologic: Awake, alert, oriented to name, place and time.  Cranial nerves II-XII are grossly intact.    Data   Data reviewed today: I reviewed all medications, new labs and imaging results over the last 24 hours.     Recent Labs   Lab 06/02/22 2212 06/02/22 2020 06/02/22  1849 06/02/22  1646   WBC  --   --   --  7.4   HGB  --   --   --  12.5    MCV  --   --   --  84   PLT  --   --   --  216   NA  --   --   --  137   POTASSIUM  --   --  4.1 5.9*   CHLORIDE  --   --   --  105   CO2  --   --   --  20   BUN  --   --   --  15   CR  --   --   --  0.73   ANIONGAP  --   --   --  12   LILIANA  --   --   --  8.8   * 308*  --  414*   ALBUMIN  --   --   --  3.3*   PROTTOTAL  --   --   --  8.2   BILITOTAL  --   --   --  0.6   ALKPHOS  --   --   --  146   ALT  --   --   --  22

## 2022-06-04 ENCOUNTER — PATIENT OUTREACH (OUTPATIENT)
Dept: CARE COORDINATION | Facility: CLINIC | Age: 22
End: 2022-06-04
Payer: COMMERCIAL

## 2022-06-04 DIAGNOSIS — Z71.89 OTHER SPECIFIED COUNSELING: ICD-10-CM

## 2022-06-04 NOTE — PLAN OF CARE
PT discharged home with belongings via car with SO. No new prescriptions, pt aware of follow-up with Endocrinologist within 1-2 weeks. AVS given, discharge education complete. IV access removed.

## 2022-06-04 NOTE — PROGRESS NOTES
Pt  @ 16:19, 5BG 524 @ 17:02, Dr Oneill paged for both critical values (See Flowsheet). Pt continued to trend down to 197. OK'ed for discharge by Dr. López.

## 2022-06-04 NOTE — PROGRESS NOTES
Clinic Care Coordination Contact  [facility]/Voicemail       Clinical Data: Care Coordinator Outreach  Outreach attempted x 1. Unable to leave message on patient's voicemail with call back information and requested return call.  Plan: Care Coordinator will try to reach patient again in 1-2 business days.        JR Fish  [phone]  [facility]

## 2022-06-05 NOTE — PROGRESS NOTES
Clinic Care Coordination Contact  [facility]/Voicemail       Clinical Data: Care Coordinator Outreach  Outreach attempted x 2. Unable to leave message on patient's voicemail with call back information and requested return call.  Plan: Care Coordinator will do no further outreaches at this time.        JR Fish  [phone]  [facility]

## 2022-06-06 LAB
ATRIAL RATE - MUSE: 87 BPM
DIASTOLIC BLOOD PRESSURE - MUSE: NORMAL MMHG
INTERPRETATION ECG - MUSE: NORMAL
P AXIS - MUSE: 47 DEGREES
PR INTERVAL - MUSE: 132 MS
QRS DURATION - MUSE: 68 MS
QT - MUSE: 390 MS
QTC - MUSE: 469 MS
R AXIS - MUSE: 52 DEGREES
SYSTOLIC BLOOD PRESSURE - MUSE: NORMAL MMHG
T AXIS - MUSE: 45 DEGREES
VENTRICULAR RATE- MUSE: 87 BPM

## 2022-06-08 LAB
BACTERIA BLD CULT: NO GROWTH
BACTERIA BLD CULT: NO GROWTH

## 2022-07-02 ENCOUNTER — HEALTH MAINTENANCE LETTER (OUTPATIENT)
Age: 22
End: 2022-07-02

## 2022-07-26 ENCOUNTER — HOSPITAL ENCOUNTER (INPATIENT)
Facility: CLINIC | Age: 22
LOS: 1 days | Discharge: HOME | DRG: 639 | End: 2022-07-27
Attending: EMERGENCY MEDICINE | Admitting: STUDENT IN AN ORGANIZED HEALTH CARE EDUCATION/TRAINING PROGRAM
Payer: COMMERCIAL

## 2022-07-26 ENCOUNTER — APPOINTMENT (OUTPATIENT)
Dept: GENERAL RADIOLOGY | Facility: CLINIC | Age: 22
DRG: 639 | End: 2022-07-26
Attending: EMERGENCY MEDICINE
Payer: COMMERCIAL

## 2022-07-26 DIAGNOSIS — R11.2 NAUSEA AND VOMITING, INTRACTABILITY OF VOMITING NOT SPECIFIED, UNSPECIFIED VOMITING TYPE: ICD-10-CM

## 2022-07-26 DIAGNOSIS — Z11.52 ENCOUNTER FOR SCREENING LABORATORY TESTING FOR SEVERE ACUTE RESPIRATORY SYNDROME CORONAVIRUS 2 (SARS-COV-2): ICD-10-CM

## 2022-07-26 DIAGNOSIS — E10.65 TYPE 1 DIABETES MELLITUS WITH HYPERGLYCEMIA (H): ICD-10-CM

## 2022-07-26 DIAGNOSIS — E10.10 DIABETIC KETOACIDOSIS WITHOUT COMA ASSOCIATED WITH TYPE 1 DIABETES MELLITUS (H): ICD-10-CM

## 2022-07-26 LAB
ALBUMIN SERPL BCG-MCNC: 3.8 G/DL (ref 3.5–5.2)
ALBUMIN UR-MCNC: 100 MG/DL
ALP SERPL-CCNC: 130 U/L (ref 35–104)
ALT SERPL W P-5'-P-CCNC: 17 U/L (ref 10–35)
ANION GAP SERPL CALCULATED.3IONS-SCNC: 17 MMOL/L (ref 7–15)
APPEARANCE UR: ABNORMAL
AST SERPL W P-5'-P-CCNC: 36 U/L (ref 10–35)
BASOPHILS # BLD AUTO: 0 10E3/UL (ref 0–0.2)
BASOPHILS NFR BLD AUTO: 0 %
BILIRUB SERPL-MCNC: 0.3 MG/DL
BILIRUB UR QL STRIP: NEGATIVE
BUN SERPL-MCNC: 10.1 MG/DL (ref 6–20)
CA-I BLD-MCNC: 4.6 MG/DL (ref 4.4–5.2)
CALCIUM SERPL-MCNC: 8.7 MG/DL (ref 8.6–10)
CHLORIDE SERPL-SCNC: 100 MMOL/L (ref 98–107)
COLOR UR AUTO: ABNORMAL
CPB POCT: NO
CREAT SERPL-MCNC: 0.79 MG/DL (ref 0.51–0.95)
DEPRECATED HCO3 PLAS-SCNC: 18 MMOL/L (ref 22–29)
EOSINOPHIL # BLD AUTO: 0 10E3/UL (ref 0–0.7)
EOSINOPHIL NFR BLD AUTO: 0 %
ERYTHROCYTE [DISTWIDTH] IN BLOOD BY AUTOMATED COUNT: 14.8 % (ref 10–15)
FLUAV RNA SPEC QL NAA+PROBE: NEGATIVE
FLUBV RNA RESP QL NAA+PROBE: NEGATIVE
GFR SERPL CREATININE-BSD FRML MDRD: >90 ML/MIN/1.73M2
GLUCOSE BLD-MCNC: 218 MG/DL (ref 70–99)
GLUCOSE SERPL-MCNC: 221 MG/DL (ref 70–99)
GLUCOSE UR STRIP-MCNC: 1000 MG/DL
HCG SERPL QL: NEGATIVE
HCO3 BLDV-SCNC: 18 MMOL/L (ref 21–28)
HCO3 BLDV-SCNC: 19 MMOL/L (ref 21–28)
HCT VFR BLD AUTO: 36.5 % (ref 35–47)
HCT VFR BLD CALC: 33 % (ref 35–47)
HGB BLD-MCNC: 11.2 G/DL (ref 11.7–15.7)
HGB BLD-MCNC: 11.2 G/DL (ref 11.7–15.7)
HGB UR QL STRIP: NEGATIVE
HOLD SPECIMEN: NORMAL
IMM GRANULOCYTES # BLD: 0 10E3/UL
IMM GRANULOCYTES NFR BLD: 1 %
KETONES BLD-SCNC: 4.4 MMOL/L (ref 0–0.6)
KETONES UR STRIP-MCNC: >150 MG/DL
LACTATE BLD-SCNC: 0.6 MMOL/L
LACTATE SERPL-SCNC: 0.9 MMOL/L (ref 0.7–2)
LEUKOCYTE ESTERASE UR QL STRIP: NEGATIVE
LIPASE SERPL-CCNC: 15 U/L (ref 13–60)
LYMPHOCYTES # BLD AUTO: 1 10E3/UL (ref 0.8–5.3)
LYMPHOCYTES NFR BLD AUTO: 25 %
MAGNESIUM SERPL-MCNC: 1.6 MG/DL (ref 1.7–2.3)
MCH RBC QN AUTO: 26.4 PG (ref 26.5–33)
MCHC RBC AUTO-ENTMCNC: 30.7 G/DL (ref 31.5–36.5)
MCV RBC AUTO: 86 FL (ref 78–100)
MONOCYTES # BLD AUTO: 0.4 10E3/UL (ref 0–1.3)
MONOCYTES NFR BLD AUTO: 11 %
MUCOUS THREADS #/AREA URNS LPF: PRESENT /LPF
NEUTROPHILS # BLD AUTO: 2.4 10E3/UL (ref 1.6–8.3)
NEUTROPHILS NFR BLD AUTO: 63 %
NITRATE UR QL: NEGATIVE
NRBC # BLD AUTO: 0 10E3/UL
NRBC BLD AUTO-RTO: 0 /100
PCO2 BLDV: 29 MM HG (ref 40–50)
PCO2 BLDV: 33 MM HG (ref 40–50)
PH BLDV: 7.36 [PH] (ref 7.32–7.43)
PH BLDV: 7.39 [PH] (ref 7.32–7.43)
PH UR STRIP: 6 [PH] (ref 5–7)
PLATELET # BLD AUTO: 203 10E3/UL (ref 150–450)
PO2 BLDV: 35 MM HG (ref 25–47)
PO2 BLDV: 43 MM HG (ref 25–47)
POTASSIUM BLD-SCNC: 3.9 MMOL/L (ref 3.4–5.3)
POTASSIUM SERPL-SCNC: 4 MMOL/L (ref 3.4–5.3)
PROT SERPL-MCNC: 7.1 G/DL (ref 6.4–8.3)
RBC # BLD AUTO: 4.24 10E6/UL (ref 3.8–5.2)
RBC URINE: 3 /HPF
RSV RNA SPEC NAA+PROBE: NEGATIVE
SAO2 % BLDV: 67 % (ref 94–100)
SAO2 % BLDV: 79 % (ref 94–100)
SARS-COV-2 RNA RESP QL NAA+PROBE: NEGATIVE
SODIUM BLD-SCNC: 136 MMOL/L (ref 133–144)
SODIUM SERPL-SCNC: 135 MMOL/L (ref 136–145)
SP GR UR STRIP: 1.02 (ref 1–1.03)
SQUAMOUS EPITHELIAL: 10 /HPF
TRANSITIONAL EPI: <1 /HPF
UROBILINOGEN UR STRIP-MCNC: NORMAL MG/DL
WBC # BLD AUTO: 3.8 10E3/UL (ref 4–11)
WBC URINE: 2 /HPF

## 2022-07-26 PROCEDURE — 93010 ELECTROCARDIOGRAM REPORT: CPT | Performed by: EMERGENCY MEDICINE

## 2022-07-26 PROCEDURE — 85025 COMPLETE CBC W/AUTO DIFF WBC: CPT | Performed by: EMERGENCY MEDICINE

## 2022-07-26 PROCEDURE — 36415 COLL VENOUS BLD VENIPUNCTURE: CPT | Performed by: EMERGENCY MEDICINE

## 2022-07-26 PROCEDURE — 83735 ASSAY OF MAGNESIUM: CPT | Performed by: EMERGENCY MEDICINE

## 2022-07-26 PROCEDURE — 84703 CHORIONIC GONADOTROPIN ASSAY: CPT | Performed by: EMERGENCY MEDICINE

## 2022-07-26 PROCEDURE — 250N000013 HC RX MED GY IP 250 OP 250 PS 637: Performed by: EMERGENCY MEDICINE

## 2022-07-26 PROCEDURE — 36415 COLL VENOUS BLD VENIPUNCTURE: CPT

## 2022-07-26 PROCEDURE — 83605 ASSAY OF LACTIC ACID: CPT | Performed by: EMERGENCY MEDICINE

## 2022-07-26 PROCEDURE — 83690 ASSAY OF LIPASE: CPT | Performed by: EMERGENCY MEDICINE

## 2022-07-26 PROCEDURE — 96360 HYDRATION IV INFUSION INIT: CPT

## 2022-07-26 PROCEDURE — 71046 X-RAY EXAM CHEST 2 VIEWS: CPT | Mod: 26 | Performed by: RADIOLOGY

## 2022-07-26 PROCEDURE — 258N000003 HC RX IP 258 OP 636: Performed by: EMERGENCY MEDICINE

## 2022-07-26 PROCEDURE — 120N000002 HC R&B MED SURG/OB UMMC

## 2022-07-26 PROCEDURE — 82803 BLOOD GASES ANY COMBINATION: CPT

## 2022-07-26 PROCEDURE — C9803 HOPD COVID-19 SPEC COLLECT: HCPCS

## 2022-07-26 PROCEDURE — 81001 URINALYSIS AUTO W/SCOPE: CPT | Performed by: EMERGENCY MEDICINE

## 2022-07-26 PROCEDURE — 82565 ASSAY OF CREATININE: CPT

## 2022-07-26 PROCEDURE — 82947 ASSAY GLUCOSE BLOOD QUANT: CPT | Performed by: STUDENT IN AN ORGANIZED HEALTH CARE EDUCATION/TRAINING PROGRAM

## 2022-07-26 PROCEDURE — 87040 BLOOD CULTURE FOR BACTERIA: CPT | Performed by: EMERGENCY MEDICINE

## 2022-07-26 PROCEDURE — 82010 KETONE BODYS QUAN: CPT

## 2022-07-26 PROCEDURE — 87637 SARSCOV2&INF A&B&RSV AMP PRB: CPT | Performed by: EMERGENCY MEDICINE

## 2022-07-26 PROCEDURE — 93005 ELECTROCARDIOGRAM TRACING: CPT

## 2022-07-26 PROCEDURE — 71046 X-RAY EXAM CHEST 2 VIEWS: CPT

## 2022-07-26 PROCEDURE — 96361 HYDRATE IV INFUSION ADD-ON: CPT

## 2022-07-26 PROCEDURE — 82010 KETONE BODYS QUAN: CPT | Performed by: EMERGENCY MEDICINE

## 2022-07-26 PROCEDURE — 99222 1ST HOSP IP/OBS MODERATE 55: CPT | Mod: AI | Performed by: STUDENT IN AN ORGANIZED HEALTH CARE EDUCATION/TRAINING PROGRAM

## 2022-07-26 PROCEDURE — 99285 EMERGENCY DEPT VISIT HI MDM: CPT | Mod: 25

## 2022-07-26 PROCEDURE — 83930 ASSAY OF BLOOD OSMOLALITY: CPT

## 2022-07-26 PROCEDURE — 84100 ASSAY OF PHOSPHORUS: CPT

## 2022-07-26 PROCEDURE — 99285 EMERGENCY DEPT VISIT HI MDM: CPT | Mod: 25 | Performed by: EMERGENCY MEDICINE

## 2022-07-26 PROCEDURE — 82947 ASSAY GLUCOSE BLOOD QUANT: CPT | Performed by: EMERGENCY MEDICINE

## 2022-07-26 RX ORDER — DEXTROSE MONOHYDRATE 25 G/50ML
25-50 INJECTION, SOLUTION INTRAVENOUS
Status: DISCONTINUED | OUTPATIENT
Start: 2022-07-26 | End: 2022-07-27 | Stop reason: HOSPADM

## 2022-07-26 RX ORDER — ENOXAPARIN SODIUM 100 MG/ML
40 INJECTION SUBCUTANEOUS EVERY 24 HOURS
Status: DISCONTINUED | OUTPATIENT
Start: 2022-07-26 | End: 2022-07-27 | Stop reason: HOSPADM

## 2022-07-26 RX ORDER — NICOTINE POLACRILEX 4 MG
15-30 LOZENGE BUCCAL
Status: DISCONTINUED | OUTPATIENT
Start: 2022-07-26 | End: 2022-07-27 | Stop reason: HOSPADM

## 2022-07-26 RX ORDER — ACETAMINOPHEN 500 MG
1000 TABLET ORAL ONCE
Status: COMPLETED | OUTPATIENT
Start: 2022-07-26 | End: 2022-07-26

## 2022-07-26 RX ORDER — DEXTROSE MONOHYDRATE, SODIUM CHLORIDE, AND POTASSIUM CHLORIDE 50; 2.24; 4.5 G/1000ML; G/1000ML; G/1000ML
INJECTION, SOLUTION INTRAVENOUS CONTINUOUS
Status: DISCONTINUED | OUTPATIENT
Start: 2022-07-26 | End: 2022-07-27 | Stop reason: HOSPADM

## 2022-07-26 RX ORDER — ONDANSETRON 2 MG/ML
4 INJECTION INTRAMUSCULAR; INTRAVENOUS EVERY 30 MIN PRN
Status: DISCONTINUED | OUTPATIENT
Start: 2022-07-26 | End: 2022-07-27 | Stop reason: HOSPADM

## 2022-07-26 RX ORDER — SODIUM CHLORIDE 9 MG/ML
INJECTION, SOLUTION INTRAVENOUS CONTINUOUS
Status: DISCONTINUED | OUTPATIENT
Start: 2022-07-26 | End: 2022-07-26

## 2022-07-26 RX ADMIN — SODIUM CHLORIDE 1000 ML: 9 INJECTION, SOLUTION INTRAVENOUS at 21:01

## 2022-07-26 RX ADMIN — SODIUM CHLORIDE 1000 ML: 9 INJECTION, SOLUTION INTRAVENOUS at 20:00

## 2022-07-26 RX ADMIN — ACETAMINOPHEN 1000 MG: 500 TABLET ORAL at 20:01

## 2022-07-26 RX ADMIN — SODIUM CHLORIDE: 9 INJECTION, SOLUTION INTRAVENOUS at 22:27

## 2022-07-26 ASSESSMENT — ENCOUNTER SYMPTOMS
CONFUSION: 0
NAUSEA: 1
EYE PAIN: 0
BACK PAIN: 0
CHEST TIGHTNESS: 1
FREQUENCY: 0
DIFFICULTY URINATING: 0
VOMITING: 1
FATIGUE: 0
CONSTIPATION: 0
ABDOMINAL DISTENTION: 0
SHORTNESS OF BREATH: 1
FEVER: 0
HEADACHES: 0
MYALGIAS: 1
DIZZINESS: 0
COLOR CHANGE: 0
NECK PAIN: 0
DIARRHEA: 0
COUGH: 0
DYSURIA: 0
WEAKNESS: 0
SORE THROAT: 0
ARTHRALGIAS: 0
PALPITATIONS: 0
CHILLS: 0
ABDOMINAL PAIN: 0

## 2022-07-26 ASSESSMENT — ACTIVITIES OF DAILY LIVING (ADL)
ADLS_ACUITY_SCORE: 35
ADLS_ACUITY_SCORE: 35

## 2022-07-26 NOTE — ED PROVIDER NOTES
[employer] EMERGENCY DEPARTMENT ([facility])  22  History     Chief Complaint   Patient presents with     Vomiting     The history is provided by the patient and medical records.     Myriam Wylie is a 22 year old female with a past medical history significant for T1DM with DKA who presents to the Emergency Department for evaluation of nausea and vomiting. Patient reports that in the past 48 hours she has begun feeling generally ill with body aches, chest heaviness, shortness of breath, and vomiting. She has vomited 10x today. She reports frequent urination. No diarrhea. Shortness of breath and chest heaviness are somewhat improved today. She states that she believes that she has gone back into DKA as this feels similar to prior episodes. She states that it has been happening more frequently since she has been exposed to more viral infections while working at a . Her sugars have been normal, except when she is ill. She has a Dexcom and uses test strips. She follows with Endocrinology and there have been no changes to her insulin regimen. She denies any recent missed doses. No rash, rhinorrhea, or sore throat.     Per chart review, patient was admitted to [facility]  for DKA.  Lab work demonstrated ketones of 3.5 and lactic acid of 2.5.  Glucose was 414 and potassium of 4.1.  Patient was given IV fluids, Tessalon, and hydromorphone.  She was seen by endocrinology and consultation and advised to be back on her regular insulin regimen and to follow-up with her primary endocrinologist for insulin doses to be adjusted as needed.  Patient was seen by endocrinology yesterday () and reported diabetes control is worsening since her Dexcom transmitter .     Past Medical History  Past Medical History:   Diagnosis Date     C. difficile colitis      Diabetes type 1, uncontrolled (H)      DKA (diabetic ketoacidosis) (H)      PID (acute pelvic inflammatory disease)      Past Surgical History:    Procedure Laterality Date     COLONOSCOPY N/A 9/18/2019    Procedure: COLONOSCOPY;  Surgeon: Jeremi Banks MD;  Location: [facility]      ESOPHAGOSCOPY, GASTROSCOPY, DUODENOSCOPY (EGD), COMBINED N/A 9/18/2019    Procedure: Upper endoscopy and colonoscopy with biopsy;  Surgeon: Jeremi Banks MD;  Location: [facility]      acetone, Urine, test STRP  blood glucose monitoring (ACCU-CHEK FASTCLIX) lancets  blood glucose monitoring (ACCU-CHEK HENRI SMARTVIEW) meter device kit  blood glucose monitoring (ACCU-CHEK SMARTVIEW) test strip  Glucagon 0.5 MG/0.1ML SOSY  insulin aspart (NOVOLOG PEN) 100 UNIT/ML pen  insulin aspart (NOVOLOG PEN) 100 UNIT/ML pen  insulin aspart (NOVOLOG PEN) 100 UNIT/ML pen  insulin glargine (BASAGLAR KWIKPEN) 100 UNIT/ML pen  insulin pen needle (BD HENRI U/F) 32G X 4 MM  ondansetron (ZOFRAN) 4 MG tablet  Prenatal Vit-Fe Fumarate-FA (PRENATAL MULTIVITAMIN W/IRON) 27-0.8 MG tablet  pyridOXINE (VITAMIN B6) 25 MG tablet      No Known Allergies  Family History  Family History   Problem Relation Age of Onset     Diabetes No family hx of         type 1 diabetes or autoimmunity     Social History   Social History     Tobacco Use     Smoking status: Never Smoker     Smokeless tobacco: Never Used   Substance Use Topics     Alcohol use: Yes     Drug use: Yes     Types: Marijuana      Past medical history, past surgical history, medications, allergies, family history, and social history were reviewed with the patient. No additional pertinent items.     I have reviewed the Medications, Allergies, Past Medical and Surgical History, and Social History in the Epic system.    Review of Systems   Constitutional: Negative for chills, fatigue and fever.   HENT: Negative for congestion and sore throat.    Eyes: Negative for pain and visual disturbance.   Respiratory: Positive for chest tightness and shortness of breath. Negative for cough.    Cardiovascular: Negative for chest pain and palpitations.  "  Gastrointestinal: Positive for nausea and vomiting. Negative for abdominal distention, abdominal pain, constipation and diarrhea.   Genitourinary: Negative for difficulty urinating, dysuria, frequency and urgency.   Musculoskeletal: Positive for myalgias. Negative for arthralgias, back pain and neck pain.   Skin: Negative for color change and rash.   Neurological: Negative for dizziness, weakness and headaches.   Psychiatric/Behavioral: Negative for confusion.     A complete review of systems was performed with pertinent positives and negatives noted in the HPI, and all other systems negative.    Physical Exam   BP: 109/67  Pulse: 94  Temp: (!) 100.9  F (38.3  C)  Resp: 16  Height: 149.9 cm (4' 11\")  Weight: 57.6 kg (127 lb)  SpO2: 100 %      Physical Exam  Vitals and nursing note reviewed.   Constitutional:       General: She is not in acute distress.     Appearance: Normal appearance. She is ill-appearing. She is not toxic-appearing or diaphoretic.   HENT:      Head: Normocephalic and atraumatic.      Nose: Nose normal.      Mouth/Throat:      Mouth: Mucous membranes are moist.   Eyes:      Pupils: Pupils are equal, round, and reactive to light.   Cardiovascular:      Rate and Rhythm: Tachycardia present.      Pulses: Normal pulses.      Heart sounds: Normal heart sounds.   Pulmonary:      Effort: Pulmonary effort is normal. No respiratory distress.      Breath sounds: Normal breath sounds. No wheezing.   Abdominal:      General: Abdomen is flat. There is no distension.   Musculoskeletal:         General: No swelling or deformity. Normal range of motion.      Cervical back: Normal range of motion. No rigidity.   Skin:     General: Skin is warm.      Capillary Refill: Capillary refill takes less than 2 seconds.   Neurological:      Mental Status: She is alert and oriented to person, place, and time.   Psychiatric:         Mood and Affect: Mood normal.         ED Course       ED Course as of 07/27/22 0031   Tue " Jul 26, 2022 2001 Patient presents to ED with concern that she is in DKA.  She describes diffuse myalgias, generalized weakness, abdominal pain, vomiting for the past 48 hours.   2002 Differential diagnosis includes DKA, sepsis, gastroenteritis, colitis, pancreatitis, cholecystitis, UTI, pyelonephritis, electrolyte abnormality, dehydration   2002 On arrival, patient is mildly tachycardic at 9200.  She is febrile at 100.9.  Blood pressure normal at 109/67.  Her physical exam is overall benign.  No obvious source of infection.  Abdomen soft and nondistended         Results for orders placed or performed during the hospital encounter of 07/26/22 (from the past 24 hour(s))   CBC with platelets differential    Narrative    The following orders were created for panel order CBC with platelets differential.  Procedure                               Abnormality         Status                     ---------                               -----------         ------                     CBC with platelets and d...[[id number]]  Abnormal            Final result                 Please view results for these tests on the individual orders.   Comprehensive metabolic panel   Result Value Ref Range    Sodium 135 (L) 136 - 145 mmol/L    Potassium 4.0 3.4 - 5.3 mmol/L    Creatinine 0.79 0.51 - 0.95 mg/dL    Urea Nitrogen 10.1 6.0 - 20.0 mg/dL    Chloride 100 98 - 107 mmol/L    Carbon Dioxide (CO2) 18 (L) 22 - 29 mmol/L    Anion Gap 17 (H) 7 - 15 mmol/L    Glucose 221 (H) 70 - 99 mg/dL    Calcium 8.7 8.6 - 10.0 mg/dL    Protein Total 7.1 6.4 - 8.3 g/dL    Albumin 3.8 3.5 - 5.2 g/dL    Bilirubin Total 0.3 <=1.2 mg/dL    Alkaline Phosphatase 130 (H) 35 - 104 U/L    AST 36 (H) 10 - 35 U/L    ALT 17 10 - 35 U/L    GFR Estimate >90 >60 mL/min/1.73m2   Lipase   Result Value Ref Range    Lipase 15 13 - 60 U/L   HCG qualitative Blood   Result Value Ref Range    hCG Serum Qualitative Negative Negative   CBC with platelets and differential   Result  Value Ref Range    WBC Count 3.8 (L) 4.0 - 11.0 10e3/uL    RBC Count 4.24 3.80 - 5.20 10e6/uL    Hemoglobin 11.2 (L) 11.7 - 15.7 g/dL    Hematocrit 36.5 35.0 - 47.0 %    MCV 86 78 - 100 fL    MCH 26.4 (L) 26.5 - 33.0 pg    MCHC 30.7 (L) 31.5 - 36.5 g/dL    RDW 14.8 10.0 - 15.0 %    Platelet Count 203 150 - 450 10e3/uL    % Neutrophils 63 %    % Lymphocytes 25 %    % Monocytes 11 %    % Eosinophils 0 %    % Basophils 0 %    % Immature Granulocytes 1 %    NRBCs per 100 WBC 0 <1 /100    Absolute Neutrophils 2.4 1.6 - 8.3 10e3/uL    Absolute Lymphocytes 1.0 0.8 - 5.3 10e3/uL    Absolute Monocytes 0.4 0.0 - 1.3 10e3/uL    Absolute Eosinophils 0.0 0.0 - 0.7 10e3/uL    Absolute Basophils 0.0 0.0 - 0.2 10e3/uL    Absolute Immature Granulocytes 0.0 <=0.4 10e3/uL    Absolute NRBCs 0.0 10e3/uL   Magnesium   Result Value Ref Range    Magnesium 1.6 (L) 1.7 - 2.3 mg/dL   iStat Gases Electrolytes ICA Glucose Venous, POCT   Result Value Ref Range    CPB Applied No     Hematocrit POCT 33 (L) 35 - 47 %    Calcium, Ionized Whole Blood POCT 4.6 4.4 - 5.2 mg/dL    Glucose Whole Blood POCT 218 (H) 70 - 99 mg/dL    Bicarbonate Venous POCT 19 (L) 21 - 28 mmol/L    Hemoglobin POCT 11.2 (L) 11.7 - 15.7 g/dL    Potassium POCT 3.9 3.4 - 5.3 mmol/L    Sodium POCT 136 133 - 144 mmol/L    pCO2 Venous POCT 33 (L) 40 - 50 mm Hg    pO2 Venous POCT 35 25 - 47 mm Hg    pH Venous POCT 7.36 7.32 - 7.43    O2 Sat, Venous POCT 67 (L) 94 - 100 %   Lactic acid whole blood   Result Value Ref Range    Lactic Acid 0.9 0.7 - 2.0 mmol/L   Ketone Beta-Hydroxybutyrate Quantitative   Result Value Ref Range    Ketone (Beta-Hydroxybutyrate) Quantitative 4.4 (HH) 0.0 - 0.6 mmol/L   iStat Gases (lactate) venous, POCT   Result Value Ref Range    Lactic Acid POCT 0.6 <=2.0 mmol/L    Bicarbonate Venous POCT 18 (L) 21 - 28 mmol/L    O2 Sat, Venous POCT 79 (L) 94 - 100 %    pCO2V Venous POCT 29 (L) 40 - 50 mm Hg    pH Venous POCT 7.39 7.32 - 7.43    pO2 Venous POCT 43 25 -  47 mm Hg   UA with Microscopic reflex to Culture    Specimen: Urine, Clean Catch   Result Value Ref Range    Color Urine Light Yellow Colorless, Straw, Light Yellow, Yellow    Appearance Urine Slightly Cloudy (A) Clear    Glucose Urine 1000  (A) Negative mg/dL    Bilirubin Urine Negative Negative    Ketones Urine >150 (A) Negative mg/dL    Specific Gravity Urine 1.021 1.003 - 1.035    Blood Urine Negative Negative    pH Urine 6.0 5.0 - 7.0    Protein Albumin Urine 100  (A) Negative mg/dL    Urobilinogen Urine Normal Normal, 2.0 mg/dL    Nitrite Urine Negative Negative    Leukocyte Esterase Urine Negative Negative    Mucus Urine Present (A) None Seen /LPF    RBC Urine 3 (H) <=2 /HPF    WBC Urine 2 <=5 /HPF    Squamous Epithelials Urine 10 (H) <=1 /HPF    Transitional Epithelials Urine <1 <=1 /HPF    Narrative    Urine Culture not indicated   Symptomatic; Unknown Influenza A/B & SARS-CoV2 (COVID-19) Virus PCR Multiplex Nose    Specimen: Nose; Swab   Result Value Ref Range    Influenza A PCR Negative Negative    Influenza B PCR Negative Negative    RSV PCR Negative Negative    SARS CoV2 PCR Negative Negative, Testing sent to reference lab. Results will be returned via unsolicited result    Narrative    Testing was performed using the Xpert Xpress CoV2/Flu/RSV Assay on the [employer] GeneXpert Instrument. This test should be ordered for the detection of SARS-CoV-2 and influenza viruses in individuals who meet clinical and/or epidemiological criteria. Test performance is unknown in asymptomatic patients. This test is for in vitro diagnostic use under the FDA EUA for laboratories certified under CLIA to perform high or moderate complexity testing. This test has not been FDA cleared or approved. A negative result does not rule out the presence of PCR inhibitors in the specimen or target RNA in concentration below the limit of detection for the assay. If only one viral target is positive but coinfection with multiple targets  is suspected, the sample should be re-tested with another FDA cleared, approved, or authorized test, if coinfection would change clinical management. This test was validated by the [facility] [employer]. These laboratories are certified under the Clinical  Laboratory Improvement Amendments of 1988 (CLIA-88) as qualified to perform high complexity laboratory testing.   XR Chest 2 Views    Narrative    EXAM: XR CHEST 2 VIEWS 7/26/2022 9:18 PM    HISTORY: fever, dka.    COMPARISON: Radiograph of the chest dated 9/19/2019.    TECHNIQUE: Upright frontal and lateral views of the chest.    FINDINGS: Trachea is midline. Cardiac and mediastinal silhouette is  within normal limits. No focal pulmonary opacities. No pleural  effusions. No pneumothoraces. No acute osseous abnormalities.      Impression    IMPRESSION: No focal pulmonary opacities.    I have personally reviewed the examination and initial interpretation  and I agree with the findings.    DAVID HALL MD         SYSTEM ID:  [id number]   Extra Tube    Narrative    The following orders were created for panel order Extra Tube.  Procedure                               Abnormality         Status                     ---------                               -----------         ------                     Extra Red Top Tube[[id number]]                               Final result               Extra Green Top (Lithium...[[id number]]                      Final result               Extra Purple Top Tube[[id number]]                            Final result                 Please view results for these tests on the individual orders.   Extra Red Top Tube   Result Value Ref Range    Hold Specimen JIC    Extra Green Top (Lithium Heparin) Tube   Result Value Ref Range    Hold Specimen JIC    Extra Purple Top Tube   Result Value Ref Range    Hold Specimen JIC    Extra Tube    Narrative    The following orders were created for panel order Extra Tube.  Procedure                                Abnormality         Status                     ---------                               -----------         ------                     Extra Blue Top Tube[[id number]]                              Final result                 Please view results for these tests on the individual orders.   Extra Blue Top Tube   Result Value Ref Range    Hold Specimen JIC    EKG 12-lead, tracing only - DKA   Result Value Ref Range    Systolic Blood Pressure  mmHg    Diastolic Blood Pressure  mmHg    Ventricular Rate 93 BPM    Atrial Rate 93 BPM    SD Interval 144 ms    QRS Duration 74 ms     ms    QTc 467 ms    P Axis 36 degrees    R AXIS 41 degrees    T Axis 38 degrees    Interpretation ECG       Sinus rhythm with sinus arrhythmia  Possible Left atrial enlargement  Borderline ECG       Medications   ondansetron (ZOFRAN) injection 4 mg (has no administration in time range)   insulin 1 unit/1mL in saline (NovoLIN, HumuLIN Regular) drip - DKA algorithm (has no administration in time range)   glucose gel 15-30 g (has no administration in time range)     Or   dextrose 50 % injection 25-50 mL (has no administration in time range)     Or   glucagon injection 1 mg (has no administration in time range)   enoxaparin ANTICOAGULANT (LOVENOX) injection 40 mg (has no administration in time range)   dextrose 5% and 0.45% NaCl + KCl 30 mEq/L infusion (has no administration in time range)   NaCl 0.45 % 1,000 mL with potassium chloride 30 mEq/L infusion (has no administration in time range)   0.9% sodium chloride BOLUS (0 mLs Intravenous Stopped 7/26/22 2224)   acetaminophen (TYLENOL) tablet 1,000 mg (1,000 mg Oral Given 7/26/22 2001)   0.9% sodium chloride BOLUS (0 mLs Intravenous Stopped 7/26/22 2225)             Assessments & Plan (with Medical Decision Making)   Labs notable for sodium 135, potassium 4.0, creatinine 0.79, anion gap 17, bicarbonate 18, glucose 221, white blood cell count 3.8, hemoglobin 11.2.  Lactic acid is normal  at 0.9.  Low suspicion for septic shock.  Ketones significantly elevated at 4.4, consistent with diagnosis of DKA.  Patient was started on IV fluids.  Was given 40 mEq oral potassium.  Was given 1000 mg of Tylenol for fever and defervesced appropriately.  Chest x-ray without any obvious source of infection.  Urinalysis does not appear infected.  Influenza negative.  COVID-negative.    The exact reason the patient went into DKA is not entirely clear.  No obvious source of infection on my exam.  Case discussed with the medicine team who will admit to their service for further work-up/care.    I have reviewed the nursing notes.    I have reviewed the findings, diagnosis, plan and need for follow up with the patient.    New Prescriptions    No medications on file       Final diagnoses:   Diabetic ketoacidosis without coma associated with type 1 diabetes mellitus (H)   I, Morena Allen, am serving as a trained medical scribe to document services personally performed by Marcus Hugo DO, based on the provider's statements to me.     IMarcus DO, was physically present and have reviewed and verified the accuracy of this note documented by Morena Allen.        7/26/2022   [facility] EMERGENCY DEPARTMENT     Marcus Hugo DO  07/27/22 0033

## 2022-07-26 NOTE — LETTER
Myriam Wylie was seen and treated in our facility. She may return to work on 7/29/22.     If you have any questions or concerns, please don't hesitate to call.      Taylor Morales MD

## 2022-07-26 NOTE — ED TRIAGE NOTES
Patient arrives via [place] with c/o DKA. Patient reports recent hospitalization, states that she has been having high blood sugar readings. 200's for EMS.      Triage Assessment     Row Name 07/26/22 1532       Triage Assessment (Adult)    Airway WDL WDL       Respiratory WDL    Respiratory WDL WDL       Skin Circulation/Temperature WDL    Skin Circulation/Temperature WDL WDL       Cardiac WDL    Cardiac WDL WDL       Peripheral/Neurovascular WDL    Peripheral Neurovascular WDL WDL       Cognitive/Neuro/Behavioral WDL    Cognitive/Neuro/Behavioral WDL WDL

## 2022-07-27 VITALS
HEIGHT: 59 IN | WEIGHT: 130.2 LBS | DIASTOLIC BLOOD PRESSURE: 77 MMHG | OXYGEN SATURATION: 100 % | HEART RATE: 72 BPM | TEMPERATURE: 98.2 F | BODY MASS INDEX: 26.25 KG/M2 | SYSTOLIC BLOOD PRESSURE: 126 MMHG | RESPIRATION RATE: 20 BRPM

## 2022-07-27 LAB
ANION GAP SERPL CALCULATED.3IONS-SCNC: 10 MMOL/L (ref 7–15)
ANION GAP SERPL CALCULATED.3IONS-SCNC: 11 MMOL/L (ref 7–15)
ANION GAP SERPL CALCULATED.3IONS-SCNC: 15 MMOL/L (ref 7–15)
ATRIAL RATE - MUSE: 93 BPM
BASE EXCESS BLDV CALC-SCNC: -6.8 MMOL/L (ref -7.7–1.9)
BUN SERPL-MCNC: 5.9 MG/DL (ref 6–20)
BUN SERPL-MCNC: 7.9 MG/DL (ref 6–20)
BUN SERPL-MCNC: 9.5 MG/DL (ref 6–20)
CALCIUM SERPL-MCNC: 7.8 MG/DL (ref 8.6–10)
CALCIUM SERPL-MCNC: 8.1 MG/DL (ref 8.6–10)
CALCIUM SERPL-MCNC: 8.4 MG/DL (ref 8.6–10)
CHLORIDE SERPL-SCNC: 103 MMOL/L (ref 98–107)
CHLORIDE SERPL-SCNC: 104 MMOL/L (ref 98–107)
CHLORIDE SERPL-SCNC: 106 MMOL/L (ref 98–107)
CREAT SERPL-MCNC: 0.63 MG/DL (ref 0.51–0.95)
CREAT SERPL-MCNC: 0.67 MG/DL (ref 0.51–0.95)
CREAT SERPL-MCNC: 0.73 MG/DL (ref 0.51–0.95)
CREAT SERPL-MCNC: 0.73 MG/DL (ref 0.51–0.95)
DEPRECATED HCO3 PLAS-SCNC: 17 MMOL/L (ref 22–29)
DEPRECATED HCO3 PLAS-SCNC: 18 MMOL/L (ref 22–29)
DEPRECATED HCO3 PLAS-SCNC: 20 MMOL/L (ref 22–29)
DIASTOLIC BLOOD PRESSURE - MUSE: NORMAL MMHG
GFR SERPL CREATININE-BSD FRML MDRD: >90 ML/MIN/1.73M2
GLUCOSE BLDC GLUCOMTR-MCNC: 101 MG/DL (ref 70–99)
GLUCOSE BLDC GLUCOMTR-MCNC: 123 MG/DL (ref 70–99)
GLUCOSE BLDC GLUCOMTR-MCNC: 129 MG/DL (ref 70–99)
GLUCOSE BLDC GLUCOMTR-MCNC: 149 MG/DL (ref 70–99)
GLUCOSE BLDC GLUCOMTR-MCNC: 153 MG/DL (ref 70–99)
GLUCOSE BLDC GLUCOMTR-MCNC: 159 MG/DL (ref 70–99)
GLUCOSE BLDC GLUCOMTR-MCNC: 169 MG/DL (ref 70–99)
GLUCOSE BLDC GLUCOMTR-MCNC: 193 MG/DL (ref 70–99)
GLUCOSE BLDC GLUCOMTR-MCNC: 93 MG/DL (ref 70–99)
GLUCOSE SERPL-MCNC: 159 MG/DL (ref 70–99)
GLUCOSE SERPL-MCNC: 198 MG/DL (ref 70–99)
GLUCOSE SERPL-MCNC: 99 MG/DL (ref 70–99)
HBA1C MFR BLD: 10.9 %
HCO3 BLDV-SCNC: 19 MMOL/L (ref 21–28)
HOLD SPECIMEN: NORMAL
HOLD SPECIMEN: NORMAL
INTERPRETATION ECG - MUSE: NORMAL
KETONES BLD-SCNC: 0.8 MMOL/L (ref 0–0.6)
KETONES BLD-SCNC: 1.1 MMOL/L (ref 0–0.6)
KETONES BLD-SCNC: 1.5 MMOL/L (ref 0–0.6)
KETONES BLD-SCNC: 1.9 MMOL/L (ref 0–0.6)
KETONES BLD-SCNC: 2 MMOL/L (ref 0–0.6)
KETONES BLD-SCNC: 4.1 MMOL/L (ref 0–0.6)
O2/TOTAL GAS SETTING VFR VENT: 21 %
OSMOLALITY SERPL: 291 MMOL/KG (ref 275–295)
P AXIS - MUSE: 36 DEGREES
PCO2 BLDV: 39 MM HG (ref 40–50)
PH BLDV: 7.3 [PH] (ref 7.32–7.43)
PHOSPHATE SERPL-MCNC: 3.3 MG/DL (ref 2.5–4.5)
PO2 BLDV: 44 MM HG (ref 25–47)
POTASSIUM SERPL-SCNC: 3.7 MMOL/L (ref 3.4–5.3)
POTASSIUM SERPL-SCNC: 3.9 MMOL/L (ref 3.4–5.3)
POTASSIUM SERPL-SCNC: 4.1 MMOL/L (ref 3.4–5.3)
PR INTERVAL - MUSE: 144 MS
QRS DURATION - MUSE: 74 MS
QT - MUSE: 376 MS
QTC - MUSE: 467 MS
R AXIS - MUSE: 41 DEGREES
SODIUM SERPL-SCNC: 134 MMOL/L (ref 136–145)
SODIUM SERPL-SCNC: 135 MMOL/L (ref 136–145)
SODIUM SERPL-SCNC: 135 MMOL/L (ref 136–145)
SYSTOLIC BLOOD PRESSURE - MUSE: NORMAL MMHG
T AXIS - MUSE: 38 DEGREES
VENTRICULAR RATE- MUSE: 93 BPM

## 2022-07-27 PROCEDURE — 99239 HOSP IP/OBS DSCHRG MGMT >30: CPT | Performed by: STUDENT IN AN ORGANIZED HEALTH CARE EDUCATION/TRAINING PROGRAM

## 2022-07-27 PROCEDURE — 36415 COLL VENOUS BLD VENIPUNCTURE: CPT

## 2022-07-27 PROCEDURE — 36415 COLL VENOUS BLD VENIPUNCTURE: CPT | Performed by: STUDENT IN AN ORGANIZED HEALTH CARE EDUCATION/TRAINING PROGRAM

## 2022-07-27 PROCEDURE — 258N000003 HC RX IP 258 OP 636

## 2022-07-27 PROCEDURE — 83036 HEMOGLOBIN GLYCOSYLATED A1C: CPT | Performed by: STUDENT IN AN ORGANIZED HEALTH CARE EDUCATION/TRAINING PROGRAM

## 2022-07-27 PROCEDURE — 82010 KETONE BODYS QUAN: CPT

## 2022-07-27 PROCEDURE — 82803 BLOOD GASES ANY COMBINATION: CPT | Performed by: STUDENT IN AN ORGANIZED HEALTH CARE EDUCATION/TRAINING PROGRAM

## 2022-07-27 PROCEDURE — 258N000003 HC RX IP 258 OP 636: Performed by: STUDENT IN AN ORGANIZED HEALTH CARE EDUCATION/TRAINING PROGRAM

## 2022-07-27 PROCEDURE — 82310 ASSAY OF CALCIUM: CPT | Performed by: STUDENT IN AN ORGANIZED HEALTH CARE EDUCATION/TRAINING PROGRAM

## 2022-07-27 PROCEDURE — 250N000012 HC RX MED GY IP 250 OP 636 PS 637: Performed by: STUDENT IN AN ORGANIZED HEALTH CARE EDUCATION/TRAINING PROGRAM

## 2022-07-27 RX ORDER — SODIUM CHLORIDE, SODIUM LACTATE, POTASSIUM CHLORIDE, CALCIUM CHLORIDE 600; 310; 30; 20 MG/100ML; MG/100ML; MG/100ML; MG/100ML
INJECTION, SOLUTION INTRAVENOUS CONTINUOUS
Status: DISCONTINUED | OUTPATIENT
Start: 2022-07-27 | End: 2022-07-27

## 2022-07-27 RX ORDER — ONDANSETRON 4 MG/1
4 TABLET, FILM COATED ORAL EVERY 8 HOURS PRN
Qty: 30 TABLET | Refills: 0 | Status: SHIPPED | OUTPATIENT
Start: 2022-07-27 | End: 2022-08-06

## 2022-07-27 RX ADMIN — POTASSIUM CHLORIDE, DEXTROSE MONOHYDRATE AND SODIUM CHLORIDE: 224; 5; 450 INJECTION, SOLUTION INTRAVENOUS at 02:56

## 2022-07-27 RX ADMIN — SODIUM CHLORIDE, POTASSIUM CHLORIDE, SODIUM LACTATE AND CALCIUM CHLORIDE: 600; 310; 30; 20 INJECTION, SOLUTION INTRAVENOUS at 07:59

## 2022-07-27 RX ADMIN — INSULIN GLARGINE 15 UNITS: 100 INJECTION, SOLUTION SUBCUTANEOUS at 09:29

## 2022-07-27 ASSESSMENT — ACTIVITIES OF DAILY LIVING (ADL)
ADLS_ACUITY_SCORE: 20

## 2022-07-27 NOTE — SUMMARY OF CARE
Reason for admission: DKA  Admitted from: ED  Report received from: Carmen So RN skin assessment completed by:   Bed Algorithm reevaluated: yes  Was Pulsate ordered?: no  Care plan (primary problem) and Education initiated: yes   Flu shot ordered (October-April only):   Detailed Belongings:        RN Decompensation Assessment (Repeat at 12 hours)    (Additional guidance for RRT)      Mentation:  Exam is notable for normal (baseline) mental status    Respiratory mechanics and oxygenation:  Exam is notable for stable oxygen requirements    Cardiovascular/perfusion:   Exam is notable for normal/unchanged cardiovascular/perfusion assessment    Overall estimation of the patient s condition/stability (1 = stable patient, 7 = appears very sick)? 1 - Patient is stable without signs of deterioration

## 2022-07-27 NOTE — PROVIDER NOTIFICATION
Provider notified (name):Ailyn García MD    Reason for notification:Ketone 1.9 critical level  Recommendation/request given to provider:  Response from provider:no change .  Call before starting insulin drip.

## 2022-07-27 NOTE — PLAN OF CARE
Goal Outcome Evaluation:    Plan of Care Reviewed With: patient     Overall Patient Progress: no change    Outcome Evaluation: blood sugar 159 and 123

## 2022-07-27 NOTE — DISCHARGE SUMMARY
[facility]  Discharge Summary - Medicine & Pediatrics       Date of Admission:  7/26/2022  Date of Discharge:  7/27/2022  Discharging Provider: Perla La  Discharge Service: Medicine Service, TITI TEAM 3    Discharge Diagnoses   Mild DKA  T1DM     Follow-ups Needed After Discharge       Unresulted Labs Ordered in the Past 30 Days of this Admission     Date and Time Order Name Status Description    7/27/2022 10:02 AM Ketone Beta-Hydroxybutyrate Quantitative In process     7/26/2022  9:10 PM Blood Culture Hand, Right Preliminary     7/26/2022  9:10 PM Blood Culture Arm, Right Preliminary       These results will be followed up by Inpatient Team.    Discharge Disposition   Discharged to home  Condition at discharge: Stable    Hospital Course   Myriam Wylie with a history of T1DM was admitted with mild DKA. Trigger unclear, although she has not been eating well and had vomiting prior to admission. No obvious infectious source and patient reported compliance with insulin. Corrected with rehydration and did not require insulin gtt. She was restarted on her PTA insulin (15 units of glargine, aspart 1 for every 10 carbs, and correction of 1 for every 50 over 150). She tolerated a regular diet and was safe for discharge home.   - Patient to follow up with her [facility] Endocrinologist within 2 weeks    Consultations This Hospital Stay   None    Code Status   Full Code     The patient was discussed with Dr. Perla La.    [facility]  ______________________________________________________________________    Physical Exam   Vital Signs: Temp: 98.2  F (36.8  C) Temp src: Oral BP: 126/77 Pulse: 72   Resp: 20 SpO2: 100 % O2 Device: None (Room air)    Weight: 130 lbs 3.2 oz     General: well-appearing, in no acute distress.  HEENT: NC/AT, EOMI, PERRL, MMM   CVS:RRR. No murmurs, rubs, or gallops.  Resp: CTAB, no  wheezes or crackles   Abd: Normal active bowel sounds. Soft/non-distended, non-tender to palpation. No rebound or guarding.    Musc: no gross joint abnormalities  Neuro: alert and oriented x4, no gross neurological deficits      Primary Care Physician   Physician No Ref-Primary    Discharge Orders      Reason for your hospital stay    You were admitted with mild DKA and improved quickly with fluids.     Activity    Your activity upon discharge: activity as tolerated     Follow Up and recommended labs and tests    Follow up with your Endocrinologist through [facility], within 2 weeks for hospital follow up.     Diet    Follow this diet upon discharge: Resume previous diet     Discharge Medications   Current Discharge Medication List      CONTINUE these medications which have CHANGED    Details   !! insulin aspart (NOVOLOG PEN) 100 UNIT/ML pen Inject 1-5 Units Subcutaneous 4 times daily (with meals and nightly)  Qty: 15 mL, Refills: 1    Associated Diagnoses: Type 1 diabetes mellitus with hyperglycemia (H)      ondansetron (ZOFRAN) 4 MG tablet Take 1 tablet (4 mg) by mouth every 8 hours as needed for nausea  Qty: 30 tablet, Refills: 0    Associated Diagnoses: Nausea and vomiting, intractability of vomiting not specified, unspecified vomiting type       !! - Potential duplicate medications found. Please discuss with provider.      CONTINUE these medications which have NOT CHANGED    Details   acetone, Urine, test STRP 1 strip by In Vitro route as needed  Qty: 50 each, Refills: 1      blood glucose monitoring (ACCU-CHEK FASTCLIX) lancets Use to test blood sugar 6 times daily or as directed.  Qty: 2 Box, Refills: 6    Associated Diagnoses: Type 1 diabetes mellitus with hyperglycemia (H)      blood glucose monitoring (ACCU-CHEK HENRI SMARTVIEW) meter device kit Use to test blood sugar 6 times daily or as directed.  Qty: 2 kit, Refills: 6    Comments: 1 kit home and 1 kit school  Associated Diagnoses: Type 1 diabetes  mellitus with hyperglycemia (H)      blood glucose monitoring (ACCU-CHEK SMARTVIEW) test strip Use to test blood sugar 6 times daily or as directed.  Qty: 200 each, Refills: 6    Associated Diagnoses: Type 1 diabetes mellitus with hyperglycemia (H)      Glucagon 0.5 MG/0.1ML SOSY Inject 1 ampule Subcutaneous daily as needed  Qty: 1 mL, Refills: 0    Associated Diagnoses: Type 1 diabetes mellitus with hyperglycemia (H)      !! insulin aspart (NOVOLOG PEN) 100 UNIT/ML pen Inject 1-5 Units Subcutaneous At Bedtime  Qty: 15 mL, Refills: 0    Comments: ISF 50  1 per 50 > 120 - before bed  For  - 169 give 1 unit.   For  - 219 give 2 units.   For  - 269 give 3 units.   For  - 319 give 4 units.   For BG = or > 320 give 5 units.        Associated Diagnoses: Type 1 diabetes mellitus with hyperglycemia (H)      insulin glargine (BASAGLAR KWIKPEN) 100 UNIT/ML pen Inject 14 Units Subcutaneous daily At noon  Qty: 15 mL, Refills: 0    Comments: If Basaglar is not covered by insurance, may substitute Lantus at same dose and frequency.    Associated Diagnoses: Type 1 diabetes mellitus with hyperglycemia (H)      insulin pen needle (BD HENRI U/F) 32G X 4 MM Use 6 pen needles daily or as directed.  Qty: 200 each, Refills: 6    Associated Diagnoses: Type 1 diabetes mellitus with hyperglycemia (H)      Prenatal Vit-Fe Fumarate-FA (PRENATAL MULTIVITAMIN W/IRON) 27-0.8 MG tablet Take 1 tablet by mouth daily  Qty: 90 tablet, Refills: 3    Associated Diagnoses: Hyperemesis gravidarum      pyridOXINE (VITAMIN B6) 25 MG tablet Take 1 tablet (25 mg) by mouth 3 times daily  Qty: 90 tablet, Refills: 0    Associated Diagnoses: Hyperemesis gravidarum       !! - Potential duplicate medications found. Please discuss with provider.        Allergies   No Known Allergies

## 2022-07-27 NOTE — PROVIDER NOTIFICATION
Provider notified (name):  ([id number])  Reason for notification: Critical lab value- keytones  Response from provider: Provider aware of result and okay with discharge home this evening.

## 2022-07-27 NOTE — PLAN OF CARE
Goal Outcome Evaluation:    Plan of Care Reviewed With: patient     Overall Patient Progress: improving    Outcome Evaluation: hourly blood sugars.  A:  blood sugars remain below 200.  MD updated and insulin drip on hold.  Will check am BMP and start diet and home insulin if levels ok. Tele running NSR.  R:   continue to monitor and treat per plan of care.

## 2022-07-27 NOTE — SUMMARY OF CARE
Patient arrived with following items:    2 Cell Phones and   Septum Piercing  Blue Purse  Black T-shirt  Black Shorts  Red Bonnet  White Socks  Black slippers  Plastic bag of food and drinks

## 2022-07-27 NOTE — PLAN OF CARE
Discharged to: Home  Transportation: Private  Time: 1730  Prescriptions: to  from pts pharmacy  Belongings: all packed and sent with pt  PIV/Access: removed  Care Plan and Education discontinued: Y  Paperwork: Reviewed with pt including follow up appointments    Plan of Care Reviewed With: patient     Overall Patient Progress: improving    Outcome Evaluation: Dischage home

## 2022-07-27 NOTE — H&P
[facility]    History and Physical - Medicine Service, MAROON TEAM        Date of Admission:  7/26/2022    Assessment & Plan      Myriam Wylie is a 22 year old female admitted on 7/26/2022. She has a history of T1DM and is admitted for diabetic ketoacidosis.    #Diabetic Ketoacidosis  #T1DM  Patient has had multiple episodes of DKA in the past (10-15 per her recollection). She presented with symptoms similar to previous DKA episodes and was found to have >150 urine ketones and elevated beta-hydroxybutyrate. Glucose is not overwhelmingly impressive however will start on DKA management protocols and IV insulin. Unclear why patient presented with DKA. She had been taking insulin as prescribed prior to admission. States she has been around many kids with viral illnesses recently, possible she contracted one as well. She is not vaccinated against COVID and does not wish to be.  -1/2 NS + 30 mEq/L KCl @125mL/hr  -Insulin drip DKA algorithm  -1/2 NS w/ D5 + 30 mEq/L KCl @125mL/hr if glucose drops below 200  -Trend beta hydroxybutyrate  -NPO until AG closes  -Follow up COVID/ Influenza        Diet:  NPO  DVT Prophylaxis: Enoxaparin (Lovenox) SQ  Martines Catheter: Not present  Fluids: NS @ 250mL/hr  Central Lines: None  Cardiac Monitoring: None  Code Status:   Full    Disposition Plan      Expected Discharge Date: 07/28/2022, 12:00 PM              The patient's care was discussed with the Attending Physician, Dr. Mathis.    Sherry Higginbotham MD  Medicine Service, MAROON TEAM   [facility]  Securely message with the Vocera Web Console (learn more here)  Text page via [id number] Paging/Directory   Please see signed in provider for up to date coverage information    ______________________________________________________________________    Chief Complaint   Nausea/ vomiting    History is obtained from the patient    History of Present  Illness   Myriam Wylie is a 22 year old female who has a history of T1DM and is admitted for DKA. Patient states that beginning 2 days ago she started to feel body aches, chest heaviness, shortness of breath and vomiting. She had 10 episodes nonbloody, nonbilious vomiting today. Also reported increased urinary frequency. She states these symptoms feel similar to previous episodes of DKA she has had in the past. She states this has been occurring more frequently with increased exposure to viral infections at her job working at . She follows with Endocrinology as outpatient and has had more difficulty controlling sugars lately On exam, patient states she still has some weakness but denies any additional shortness of breath, nausea, vomiting. No fevers, chills, chest pain, diarrhea. She has no recent sick contacts. She is not vaccinated against COVID and is not interested in getting vaccinated at this time. She has not had COVID in the past.     In ED, patient afebrile, VSS. Labs notable for K 4.0, anion gap 17, glucose 218, Beta-hydroxybutarate 4.4, lactic acid 0.9, pH 7.35. She was given 2L NS and started on NS @125mL/hr and admitted to medicine for further workup.     Review of Systems    The 10 point Review of Systems is negative other than noted in the HPI.    Past Medical History    I have reviewed this patient's medical history and updated it with pertinent information if needed.   Past Medical History:   Diagnosis Date     C. difficile colitis      Diabetes type 1, uncontrolled (H)      DKA (diabetic ketoacidosis) (H)      PID (acute pelvic inflammatory disease)         Past Surgical History   I have reviewed this patient's surgical history and updated it with pertinent information if needed.  Past Surgical History:   Procedure Laterality Date     COLONOSCOPY N/A 9/18/2019    Procedure: COLONOSCOPY;  Surgeon: Jeremi Banks MD;  Location:  PEDS SEDATION      ESOPHAGOSCOPY, GASTROSCOPY, DUODENOSCOPY  (EGD), COMBINED N/A 2019    Procedure: Upper endoscopy and colonoscopy with biopsy;  Surgeon: Jeremi Banks MD;  Location:  PEDS SEDATION         Social History   I have reviewed this patient's social history and updated it with pertinent information if needed. Myriam Wylie  reports that she has never smoked. She has never used smokeless tobacco. She reports current alcohol use. She reports current drug use. Drug: Marijuana.    Prior to Admission Medications   Prior to Admission Medications   Prescriptions Last Dose Informant Patient Reported? Taking?   Glucagon 0.5 MG/0.1ML SOSY Unknown at Unknown time Self No Yes   Sig: Inject 1 ampule Subcutaneous daily as needed   Prenatal Vit-Fe Fumarate-FA (PRENATAL MULTIVITAMIN W/IRON) 27-0.8 MG tablet Unknown at Unknown time Self No Yes   Sig: Take 1 tablet by mouth daily   acetone, Urine, test STRP Unknown at Unknown time Self No Yes   Si strip by In Vitro route as needed   blood glucose monitoring (ACCU-CHEK FASTCLIX) lancets Unknown at Unknown time Self No Yes   Sig: Use to test blood sugar 6 times daily or as directed.   blood glucose monitoring (ACCU-CHEK HENRI SMARTVIEW) meter device kit Unknown at Unknown time Self No Yes   Sig: Use to test blood sugar 6 times daily or as directed.   blood glucose monitoring (ACCU-CHEK SMARTVIEW) test strip Unknown at Unknown time Self No Yes   Sig: Use to test blood sugar 6 times daily or as directed.   insulin aspart (NOVOLOG PEN) 100 UNIT/ML pen Unknown at Unknown time Self No Yes   Si unit / 10 gms of carbs with meals and snacks   insulin aspart (NOVOLOG PEN) 100 UNIT/ML pen Unknown at Unknown time Self No Yes   Sig: Inject 1-5 Units Subcutaneous 4 times daily   insulin aspart (NOVOLOG PEN) 100 UNIT/ML pen Unknown at Unknown time Self No Yes   Sig: Inject 1-5 Units Subcutaneous At Bedtime   insulin glargine (BASAGLAR KWIKPEN) 100 UNIT/ML pen Unknown at Unknown time Self No Yes   Sig: Inject 14 Units Subcutaneous  daily At noon   Patient taking differently: Inject 15 Units Subcutaneous daily At noon   insulin pen needle (BD HENRI U/F) 32G X 4 MM Unknown at Unknown time Self No Yes   Sig: Use 6 pen needles daily or as directed.   ondansetron (ZOFRAN) 4 MG tablet Unknown at Unknown time Self No Yes   Sig: Take 1 tablet (4 mg) by mouth every 8 hours as needed for nausea   pyridOXINE (VITAMIN B6) 25 MG tablet Unknown at Unknown time Self No Yes   Sig: Take 1 tablet (25 mg) by mouth 3 times daily      Facility-Administered Medications: None     Allergies   No Known Allergies    Physical Exam   Vital Signs: Temp: 99.6  F (37.6  C) Temp src: Oral BP: 122/72 Pulse: 103   Resp: 16 SpO2: 94 %      Weight: 127 lbs 0 oz    Physical Exam  Constitutional:       General: She is not in acute distress.  Cardiovascular:      Rate and Rhythm: Normal rate and regular rhythm.   Pulmonary:      Effort: Pulmonary effort is normal.      Breath sounds: Normal breath sounds.   Abdominal:      General: There is no distension.      Palpations: Abdomen is soft.      Tenderness: There is no abdominal tenderness.   Skin:     General: Skin is warm and dry.   Neurological:      General: No focal deficit present.      Mental Status: She is alert.       Data   Data reviewed today: I reviewed all medications, new labs and imaging results over the last 24 hours. I personally reviewed no images or EKG's today.    Recent Labs   Lab 07/26/22 1956 07/26/22 1955   WBC  --  3.8*   HGB 11.2* 11.2*   MCV  --  86   PLT  --  203    135*   POTASSIUM 3.9 4.0   CHLORIDE  --  100   CO2  --  18*   BUN  --  10.1   CR  --  0.79   ANIONGAP  --  17*   LILIANA  --  8.7   * 221*   ALBUMIN  --  3.8   PROTTOTAL  --  7.1   BILITOTAL  --  0.3   ALKPHOS  --  130*   ALT  --  17   AST  --  36*   LIPASE  --  15     Lab Results   Component Value Date    URINEKETONE >150 07/26/2022    URINEKETONE 100 04/17/2021

## 2022-07-27 NOTE — PLAN OF CARE
Goal Outcome Evaluation:    Plan of Care Reviewed With: patient     Overall Patient Progress: no change    Outcome Evaluation: Pt to d/c today. LR discontinued. No appetite. Pt states no nausea but a feeling of fullness as she has not been able to eat over the last 2 weeks. VSS on RA. Up independently. Tele NSR. Voiding adequately. Denies pain. BG WNL.

## 2022-07-28 ENCOUNTER — PATIENT OUTREACH (OUTPATIENT)
Dept: CARE COORDINATION | Facility: CLINIC | Age: 22
End: 2022-07-28

## 2022-07-28 DIAGNOSIS — Z71.89 OTHER SPECIFIED COUNSELING: ICD-10-CM

## 2022-07-28 NOTE — PROGRESS NOTES
Clinic Care Coordination Contact  [facility]       Clinical Data: Care Coordinator Outreach  Outreach attempted x 2.  Left message on patient's voicemail with call back information and requested return call.  Plan: Care Coordinator will try to reach patient again in 1-2 business days.    DARRIAN Wilcox  [phone]  [facility]        Clinic Care Coordination Contact  [facility]       Clinical Data: Care Coordinator Outreach  Outreach attempted x 1.  Left message on patient's voicemail with call back information and requested return call.  Plan: Care Coordinator will try to reach patient again in 1-2 business days.    DARRIAN Wilcox  [phone]  [facility]

## 2022-07-31 LAB
BACTERIA BLD CULT: NO GROWTH
BACTERIA BLD CULT: NO GROWTH

## 2022-09-13 ENCOUNTER — APPOINTMENT (OUTPATIENT)
Dept: CT IMAGING | Facility: CLINIC | Age: 22
End: 2022-09-13
Attending: EMERGENCY MEDICINE
Payer: COMMERCIAL

## 2022-09-13 ENCOUNTER — HOSPITAL ENCOUNTER (EMERGENCY)
Facility: CLINIC | Age: 22
Discharge: HOME | End: 2022-09-13
Attending: EMERGENCY MEDICINE | Admitting: EMERGENCY MEDICINE
Payer: COMMERCIAL

## 2022-09-13 VITALS
RESPIRATION RATE: 12 BRPM | HEIGHT: 59 IN | OXYGEN SATURATION: 100 % | TEMPERATURE: 97.9 F | SYSTOLIC BLOOD PRESSURE: 135 MMHG | WEIGHT: 125 LBS | BODY MASS INDEX: 25.2 KG/M2 | DIASTOLIC BLOOD PRESSURE: 87 MMHG | HEART RATE: 69 BPM

## 2022-09-13 DIAGNOSIS — E10.65 TYPE 1 DIABETES MELLITUS WITH HYPERGLYCEMIA (H): ICD-10-CM

## 2022-09-13 DIAGNOSIS — N73.9 PID (PELVIC INFLAMMATORY DISEASE): ICD-10-CM

## 2022-09-13 LAB
ALBUMIN SERPL BCG-MCNC: 3.8 G/DL (ref 3.5–5.2)
ALBUMIN UR-MCNC: NEGATIVE MG/DL
ALP SERPL-CCNC: 145 U/L (ref 35–104)
ALT SERPL W P-5'-P-CCNC: 14 U/L (ref 10–35)
ANION GAP SERPL CALCULATED.3IONS-SCNC: 14 MMOL/L (ref 7–15)
APPEARANCE UR: CLEAR
AST SERPL W P-5'-P-CCNC: 23 U/L (ref 10–35)
BASOPHILS # BLD AUTO: 0 10E3/UL (ref 0–0.2)
BASOPHILS NFR BLD AUTO: 1 %
BILIRUB SERPL-MCNC: 0.3 MG/DL
BILIRUB UR QL STRIP: NEGATIVE
BUN SERPL-MCNC: 6.9 MG/DL (ref 6–20)
CALCIUM SERPL-MCNC: 9 MG/DL (ref 8.6–10)
CHLORIDE SERPL-SCNC: 99 MMOL/L (ref 98–107)
CLUE CELLS: PRESENT
COLOR UR AUTO: ABNORMAL
CREAT SERPL-MCNC: 0.65 MG/DL (ref 0.51–0.95)
DEPRECATED HCO3 PLAS-SCNC: 20 MMOL/L (ref 22–29)
EOSINOPHIL # BLD AUTO: 0.1 10E3/UL (ref 0–0.7)
EOSINOPHIL NFR BLD AUTO: 2 %
ERYTHROCYTE [DISTWIDTH] IN BLOOD BY AUTOMATED COUNT: 14.6 % (ref 10–15)
GFR SERPL CREATININE-BSD FRML MDRD: >90 ML/MIN/1.73M2
GLUCOSE BLDC GLUCOMTR-MCNC: 179 MG/DL (ref 70–99)
GLUCOSE BLDC GLUCOMTR-MCNC: 308 MG/DL (ref 70–99)
GLUCOSE SERPL-MCNC: 336 MG/DL (ref 70–99)
GLUCOSE UR STRIP-MCNC: >=1000 MG/DL
HCG UR QL: NEGATIVE
HCT VFR BLD AUTO: 37.6 % (ref 35–47)
HGB BLD-MCNC: 11.5 G/DL (ref 11.7–15.7)
HGB UR QL STRIP: NEGATIVE
HOLD SPECIMEN: NORMAL
IMM GRANULOCYTES # BLD: 0 10E3/UL
IMM GRANULOCYTES NFR BLD: 0 %
KETONES BLD-SCNC: 0 MMOL/L (ref 0–0.6)
KETONES UR STRIP-MCNC: NEGATIVE MG/DL
LEUKOCYTE ESTERASE UR QL STRIP: NEGATIVE
LIPASE SERPL-CCNC: 23 U/L (ref 13–60)
LYMPHOCYTES # BLD AUTO: 2.5 10E3/UL (ref 0.8–5.3)
LYMPHOCYTES NFR BLD AUTO: 55 %
MCH RBC QN AUTO: 25.8 PG (ref 26.5–33)
MCHC RBC AUTO-ENTMCNC: 30.6 G/DL (ref 31.5–36.5)
MCV RBC AUTO: 85 FL (ref 78–100)
MONOCYTES # BLD AUTO: 0.3 10E3/UL (ref 0–1.3)
MONOCYTES NFR BLD AUTO: 7 %
NEUTROPHILS # BLD AUTO: 1.5 10E3/UL (ref 1.6–8.3)
NEUTROPHILS NFR BLD AUTO: 35 %
NITRATE UR QL: NEGATIVE
NRBC # BLD AUTO: 0 10E3/UL
NRBC BLD AUTO-RTO: 0 /100
PH UR STRIP: 6.5 [PH] (ref 5–7)
PLATELET # BLD AUTO: 282 10E3/UL (ref 150–450)
POTASSIUM SERPL-SCNC: 4.3 MMOL/L (ref 3.4–5.3)
PROT SERPL-MCNC: 7.1 G/DL (ref 6.4–8.3)
RBC # BLD AUTO: 4.45 10E6/UL (ref 3.8–5.2)
RBC URINE: 3 /HPF
SODIUM SERPL-SCNC: 133 MMOL/L (ref 136–145)
SP GR UR STRIP: 1.03 (ref 1–1.03)
SQUAMOUS EPITHELIAL: 3 /HPF
TRICHOMONAS, WET PREP: ABNORMAL
UROBILINOGEN UR STRIP-MCNC: NORMAL MG/DL
WBC # BLD AUTO: 4.4 10E3/UL (ref 4–11)
WBC URINE: 29 /HPF
WBC'S/HIGH POWER FIELD, WET PREP: ABNORMAL
YEAST, WET PREP: ABNORMAL

## 2022-09-13 PROCEDURE — 85025 COMPLETE CBC W/AUTO DIFF WBC: CPT | Performed by: EMERGENCY MEDICINE

## 2022-09-13 PROCEDURE — 36415 COLL VENOUS BLD VENIPUNCTURE: CPT | Performed by: EMERGENCY MEDICINE

## 2022-09-13 PROCEDURE — 99285 EMERGENCY DEPT VISIT HI MDM: CPT | Mod: 25 | Performed by: EMERGENCY MEDICINE

## 2022-09-13 PROCEDURE — 99285 EMERGENCY DEPT VISIT HI MDM: CPT | Performed by: EMERGENCY MEDICINE

## 2022-09-13 PROCEDURE — 96365 THER/PROPH/DIAG IV INF INIT: CPT | Mod: 59 | Performed by: EMERGENCY MEDICINE

## 2022-09-13 PROCEDURE — 250N000011 HC RX IP 250 OP 636: Performed by: STUDENT IN AN ORGANIZED HEALTH CARE EDUCATION/TRAINING PROGRAM

## 2022-09-13 PROCEDURE — 87491 CHLMYD TRACH DNA AMP PROBE: CPT | Performed by: EMERGENCY MEDICINE

## 2022-09-13 PROCEDURE — 87591 N.GONORRHOEAE DNA AMP PROB: CPT | Performed by: EMERGENCY MEDICINE

## 2022-09-13 PROCEDURE — 87086 URINE CULTURE/COLONY COUNT: CPT | Performed by: EMERGENCY MEDICINE

## 2022-09-13 PROCEDURE — 258N000003 HC RX IP 258 OP 636: Performed by: EMERGENCY MEDICINE

## 2022-09-13 PROCEDURE — 82010 KETONE BODYS QUAN: CPT | Performed by: EMERGENCY MEDICINE

## 2022-09-13 PROCEDURE — 250N000011 HC RX IP 250 OP 636: Performed by: EMERGENCY MEDICINE

## 2022-09-13 PROCEDURE — 81001 URINALYSIS AUTO W/SCOPE: CPT | Performed by: EMERGENCY MEDICINE

## 2022-09-13 PROCEDURE — 81025 URINE PREGNANCY TEST: CPT | Performed by: EMERGENCY MEDICINE

## 2022-09-13 PROCEDURE — 82040 ASSAY OF SERUM ALBUMIN: CPT | Performed by: EMERGENCY MEDICINE

## 2022-09-13 PROCEDURE — 74177 CT ABD & PELVIS W/CONTRAST: CPT | Mod: 26 | Performed by: RADIOLOGY

## 2022-09-13 PROCEDURE — 74177 CT ABD & PELVIS W/CONTRAST: CPT

## 2022-09-13 PROCEDURE — 96361 HYDRATE IV INFUSION ADD-ON: CPT | Performed by: EMERGENCY MEDICINE

## 2022-09-13 PROCEDURE — 83690 ASSAY OF LIPASE: CPT | Performed by: EMERGENCY MEDICINE

## 2022-09-13 PROCEDURE — 87210 SMEAR WET MOUNT SALINE/INK: CPT | Performed by: EMERGENCY MEDICINE

## 2022-09-13 PROCEDURE — 80053 COMPREHEN METABOLIC PANEL: CPT | Performed by: EMERGENCY MEDICINE

## 2022-09-13 RX ORDER — METRONIDAZOLE 500 MG/1
500 TABLET ORAL 2 TIMES DAILY
Qty: 28 TABLET | Refills: 0 | Status: SHIPPED | OUTPATIENT
Start: 2022-09-13 | End: 2022-09-27

## 2022-09-13 RX ORDER — CEFTRIAXONE 2 G/1
2 INJECTION, POWDER, FOR SOLUTION INTRAMUSCULAR; INTRAVENOUS ONCE
Status: COMPLETED | OUTPATIENT
Start: 2022-09-13 | End: 2022-09-13

## 2022-09-13 RX ORDER — ONDANSETRON 4 MG/1
4 TABLET, ORALLY DISINTEGRATING ORAL EVERY 8 HOURS PRN
Qty: 10 TABLET | Refills: 0 | Status: SHIPPED | OUTPATIENT
Start: 2022-09-13 | End: 2022-09-17

## 2022-09-13 RX ORDER — DOXYCYCLINE 100 MG/1
100 CAPSULE ORAL 2 TIMES DAILY
Qty: 28 CAPSULE | Refills: 0 | Status: SHIPPED | OUTPATIENT
Start: 2022-09-13 | End: 2022-09-27

## 2022-09-13 RX ORDER — IOPAMIDOL 755 MG/ML
77 INJECTION, SOLUTION INTRAVASCULAR ONCE
Status: COMPLETED | OUTPATIENT
Start: 2022-09-13 | End: 2022-09-13

## 2022-09-13 RX ADMIN — CEFTRIAXONE SODIUM 2 G: 2 INJECTION, POWDER, FOR SOLUTION INTRAMUSCULAR; INTRAVENOUS at 12:50

## 2022-09-13 RX ADMIN — IOPAMIDOL 77 ML: 755 INJECTION, SOLUTION INTRAVENOUS at 13:24

## 2022-09-13 RX ADMIN — SODIUM CHLORIDE 1000 ML: 9 INJECTION, SOLUTION INTRAVENOUS at 12:49

## 2022-09-13 RX ADMIN — SODIUM CHLORIDE 1000 ML: 9 INJECTION, SOLUTION INTRAVENOUS at 10:04

## 2022-09-13 ASSESSMENT — ENCOUNTER SYMPTOMS
VOMITING: 1
FEVER: 0
SLEEP DISTURBANCE: 0
SHORTNESS OF BREATH: 0
NAUSEA: 1
DIFFICULTY URINATING: 0
EYE REDNESS: 0
BACK PAIN: 0
HEADACHES: 0
ABDOMINAL PAIN: 1
SORE THROAT: 0
COUGH: 0
NECK PAIN: 0
DYSURIA: 0

## 2022-09-13 ASSESSMENT — ACTIVITIES OF DAILY LIVING (ADL)
ADLS_ACUITY_SCORE: 35

## 2022-09-13 NOTE — ED TRIAGE NOTES
Pt has had abdominal pain with nausea and vomiting for 9 days. [place] [facility] [place] but left after having labs drawn due to wait times. She has DM1, takes lantus and novalog. She states her BG has run between , but today her BG was 484. Last vomiting episode was last night. Abdominal pain is generalized, crampy, 5/10 now, has not taken pain meds, but heat does help her pain. She states she had blood ketone level of 2.0 this week at [facility], but no increased ketones or glucose in urine. No GI/ issues. She also has body aches. She states she had increased urine ketones in prior pregnancy, but not increased BG. She denies thinking she is pregnant, had negative urine pregnancy test last week.  Decreased food intake, but feeling very thirsty after drinking lots of water past few days. Not taking steroids. Never had abdominal surgeries.     Triage Assessment     Row Name 09/13/22 0914       Triage Assessment (Adult)    Airway WDL WDL       Respiratory WDL    Respiratory WDL WDL       Skin Circulation/Temperature WDL    Skin Circulation/Temperature WDL WDL       Cardiac WDL    Cardiac WDL WDL       Peripheral/Neurovascular WDL    Peripheral Neurovascular WDL WDL       Cognitive/Neuro/Behavioral WDL    Cognitive/Neuro/Behavioral WDL WDL

## 2022-09-13 NOTE — LETTER
September 13, 2022      To Whom It May Concern:      Myriam Wylie was seen in our Emergency Department today, 09/13/22.  She may return to work/school tomorrow.    Sincerely,        ZACHARIAH OCHOA MD, MD

## 2022-09-13 NOTE — DISCHARGE INSTRUCTIONS
Take complete course of doxycycline and metronidazole.  Take ondansetron as needed for nausea.  Drink plenty of fluids.  Do not have sexual intercourse until your symptoms are completely resolved and you complete your antibiotics.    Follow-up with your gynecologist or the gynecology clinic.    Please make an appointment to follow up with OB/Gyn - [facility] (phone: [phone]).    Return to the emergency department if fever, vomiting, worsening symptoms, or other concerns.

## 2022-09-13 NOTE — ED PROVIDER NOTES
ED Provider Note  [facility]      History     Chief Complaint   Patient presents with     Abdominal Pain     Hyperglycemia     HPI  Myriam Wylie is a 22 year old female with a history of insulin-dependent diabetes, DKA, C. difficile colitis, PID, pyelonephritis who presents to the emergency department with concern for high blood sugars, diffuse abdominal pain, and nausea with vomiting.  Patient states for the past 2 weeks, she has had episodes of blood sugars into the 3 and 400s for reasons that she cannot identify.  She has had diffuse abdominal pain that she describes as cramping.  She has had nausea and did vomit once in the past few days.  Her emesis consisted of gastric contents.  She had a normal bowel movement this morning.  She denies any dysuria, urgency, or frequency.  Her last menstrual period was 1 month ago.  She denies any vaginal bleeding, discharge, or pelvic pain.  She denies any fever, cough, or dyspnea.  She denies any chest pain.  She checks her blood sugars with fingersticks and injects insulin.  She has a Dexcom which she is not using.    Past Medical History  Past Medical History:   Diagnosis Date     C. difficile colitis      Diabetes type 1, uncontrolled (H)      DKA (diabetic ketoacidosis) (H)      PID (acute pelvic inflammatory disease)      Past Surgical History:   Procedure Laterality Date     COLONOSCOPY N/A 9/18/2019    Procedure: COLONOSCOPY;  Surgeon: Jeremi Banks MD;  Location: [facility]      ESOPHAGOSCOPY, GASTROSCOPY, DUODENOSCOPY (EGD), COMBINED N/A 9/18/2019    Procedure: Upper endoscopy and colonoscopy with biopsy;  Surgeon: Jeremi Banks MD;  Location: [facility]      doxycycline hyclate (VIBRAMYCIN) 100 MG capsule  metroNIDAZOLE (FLAGYL) 500 MG tablet  ondansetron (ZOFRAN ODT) 4 MG ODT tab  acetone, Urine, test STRP  blood glucose monitoring (ACCU-CHEK FASTCLIX) lancets  blood glucose monitoring (ACCU-CHEK HENRI SMARTVIEW) meter  "device kit  blood glucose monitoring (ACCU-CHEK SMARTVIEW) test strip  Glucagon 0.5 MG/0.1ML SOSY  insulin aspart (NOVOLOG PEN) 100 UNIT/ML pen  insulin aspart (NOVOLOG PEN) 100 UNIT/ML pen  insulin glargine (BASAGLAR KWIKPEN) 100 UNIT/ML pen  insulin pen needle (BD HENRI U/F) 32G X 4 MM  Prenatal Vit-Fe Fumarate-FA (PRENATAL MULTIVITAMIN W/IRON) 27-0.8 MG tablet  pyridOXINE (VITAMIN B6) 25 MG tablet      No Known Allergies  Family History  Family History   Problem Relation Age of Onset     Diabetes No family hx of         type 1 diabetes or autoimmunity     Social History   Social History     Tobacco Use     Smoking status: Never Smoker     Smokeless tobacco: Never Used   Substance Use Topics     Alcohol use: Yes     Drug use: Yes     Types: Marijuana      Past medical history, past surgical history, medications, allergies, family history, and social history were reviewed with the patient. No additional pertinent items.       Review of Systems   Constitutional: Negative for fever.   HENT: Negative for sore throat.    Eyes: Negative for redness.   Respiratory: Negative for cough and shortness of breath.    Cardiovascular: Negative for chest pain.   Gastrointestinal: Positive for abdominal pain, nausea and vomiting.   Genitourinary: Negative for difficulty urinating and dysuria.   Musculoskeletal: Negative for back pain and neck pain.   Skin: Negative for rash.   Neurological: Negative for headaches.   Psychiatric/Behavioral: Negative for sleep disturbance.   All other systems reviewed and are negative.    A complete review of systems was performed with pertinent positives and negatives noted in the HPI, and all other systems negative.    Physical Exam   BP: 120/83  Pulse: 69  Temp: 97.9  F (36.6  C)  Resp: 12  Height: 149.9 cm (4' 11\")  Weight: 56.7 kg (125 lb)  SpO2: 99 %  Physical Exam  Vitals and nursing note reviewed.   Constitutional:       General: She is not in acute distress.     Appearance: She is " well-developed. She is not diaphoretic.   HENT:      Head: Normocephalic and atraumatic.      Mouth/Throat:      Pharynx: No oropharyngeal exudate.   Eyes:      General: No scleral icterus.     Pupils: Pupils are equal, round, and reactive to light.   Cardiovascular:      Heart sounds: Normal heart sounds.   Pulmonary:      Effort: No respiratory distress.      Breath sounds: Normal breath sounds.   Abdominal:      General: Bowel sounds are normal.      Palpations: Abdomen is soft.      Tenderness: There is no abdominal tenderness.   Genitourinary:     Vagina: Vaginal discharge (white) present.      Cervix: No cervical motion tenderness.      Uterus: Normal.       Adnexa: Right adnexa normal and left adnexa normal.   Musculoskeletal:         General: No tenderness.   Skin:     General: Skin is warm and dry.      Findings: No rash.   Neurological:      General: No focal deficit present.      Mental Status: She is alert.      Cranial Nerves: No cranial nerve deficit.         ED Course      Procedures       The medical record was reviewed and interpreted.  Current labs reviewed and interpreted.  Previous labs reviewed and interpreted.  Current images reviewed and interpreted: Inflammatory change in pelvis.  Possible endometriosis..              Results for orders placed or performed during the hospital encounter of 09/13/22   CT Abdomen Pelvis w Contrast     Status: None    Narrative    EXAMINATION: CT ABDOMEN PELVIS W CONTRAST, 9/13/2022 1:37 PM    INDICATION: abdominal pain- eval for appy    COMPARISON STUDY: CT abdomen 4/17/2021, 9/17/2019, 12/9/2018    TECHNIQUE: CT scan of the abdomen and pelvis was performed on  multidetector CT scanner using volumetric acquisition technique and  images were reconstructed in multiple planes with variable thickness  and reviewed on dedicated workstations.     CONTRAST: 77ml Isovue 370 injected IV    CT scan radiation dose is optimized to minimum requisite dose using  automated  dose modulation techniques.    FINDINGS:    Lower thorax: Normal.    Liver: No mass. No intrahepatic biliary ductal dilation.    Biliary System: Mild gallbladder wall thickening. No calcified  gallstones or pericholecystic inflammatory changes. No extrahepatic  biliary ductal dilation.    Pancreas: No mass or pancreatic ductal dilation.    Adrenal glands: No mass or nodules    Spleen: Normal.    Kidneys: No suspicious mass, obstructing calculus or hydronephrosis.   Focal cortical volume loss in the posterior upper pole of the left  kidney at the site of prior pyelonephritis seen on 9/17/2019.    Gastrointestinal tract :Normal appendix. Normal caliber small bowel.    Mesentery/peritoneum/retroperitoneum: Mesenteric heterogeneity in the  left anterior abdomen increased in the conspicuity compared to prior  (series 3 image 126 - 300). Small volume free fluid in the right  paracolic gutter in the right abdomen and pelvis. No mass.     Lymph nodes: Stable prominent lymph nodes in the periaortic, right  mesenteric and right iliac chain region. No significant  lymphadenopathy.    Vasculature: Patent major abdominal vasculature.    Pelvis: Urinary bladder is normal. Nonspecific presacral fat  stranding. The uterus is within normal limits. 2.1 cm dominant right  ovarian follicle vs. endometrioma.  Left ovary is within normal  limits.    Osseous structures: No aggressive or acute osseous lesion.      Soft tissues: Within normal limits.      Impression    IMPRESSION:   1. Free fluid in the right paracolic gutter and pelvis (greater than  expected for physiologic fluid) with nonspecific fat stranding in the  lower pelvis, which can be seen in the setting of pelvic inflammatory  disease. Similar but less conspicuous findings were seen on 4/17/2021  as well as other prior studies dating back to 2018. Recommend clinical  correlation.  2. Increased conspicuity of mesenteric heterogeneity in the left  anterior abdomen raises the  question of endometriosis, though an  infectious/inflammatory or, less likely, neoplastic process could  result in similar findings.  3. 2.1 cm cystic lesion within the right ovary, likely dominant right  ovarian follicle, however endometrioma cannot be excluded given  question of endometriosis. Consider further evaluation with  ultrasound.  4. Normal appendix.    I have personally reviewed the examination and initial interpretation  and I agree with the findings.    MARTIN CASILLAS,          SYSTEM ID:  [id number]   UA with Microscopic reflex to Culture     Status: Abnormal    Specimen: Urine, Clean Catch   Result Value Ref Range    Color Urine Straw Colorless, Straw, Light Yellow, Yellow    Appearance Urine Clear Clear    Glucose Urine >=1000 (A) Negative mg/dL    Bilirubin Urine Negative Negative    Ketones Urine Negative Negative mg/dL    Specific Gravity Urine 1.035 1.003 - 1.035    Blood Urine Negative Negative    pH Urine 6.5 5.0 - 7.0    Protein Albumin Urine Negative Negative mg/dL    Urobilinogen Urine Normal Normal, 2.0 mg/dL    Nitrite Urine Negative Negative    Leukocyte Esterase Urine Negative Negative    RBC Urine 3 (H) <=2 /HPF    WBC Urine 29 (H) <=5 /HPF    Squamous Epithelials Urine 3 (H) <=1 /HPF    Narrative    Urine Culture ordered based on laboratory criteria   Bivalve Draw     Status: None    Narrative    The following orders were created for panel order Bivalve Draw.  Procedure                               Abnormality         Status                     ---------                               -----------         ------                     Extra Blue Top Tube[[id number]]                              Final result               Extra Red Top Tube[[id number]]                               Final result               Extra Green Top (Lithium...[[id number]]                      Final result               Extra Purple Top Tube[[id number]]                            Final result                 Please  view results for these tests on the individual orders.   Comprehensive metabolic panel     Status: Abnormal   Result Value Ref Range    Sodium 133 (L) 136 - 145 mmol/L    Potassium 4.3 3.4 - 5.3 mmol/L    Chloride 99 98 - 107 mmol/L    Carbon Dioxide (CO2) 20 (L) 22 - 29 mmol/L    Anion Gap 14 7 - 15 mmol/L    Urea Nitrogen 6.9 6.0 - 20.0 mg/dL    Creatinine 0.65 0.51 - 0.95 mg/dL    Calcium 9.0 8.6 - 10.0 mg/dL    Glucose 336 (H) 70 - 99 mg/dL    Alkaline Phosphatase 145 (H) 35 - 104 U/L    AST 23 10 - 35 U/L    ALT 14 10 - 35 U/L    Protein Total 7.1 6.4 - 8.3 g/dL    Albumin 3.8 3.5 - 5.2 g/dL    Bilirubin Total 0.3 <=1.2 mg/dL    GFR Estimate >90 >60 mL/min/1.73m2   Extra Blue Top Tube     Status: None   Result Value Ref Range    Hold Specimen JIC    Extra Red Top Tube     Status: None   Result Value Ref Range    Hold Specimen JIC    Extra Green Top (Lithium Heparin) Tube     Status: None   Result Value Ref Range    Hold Specimen JIC    Extra Purple Top Tube     Status: None   Result Value Ref Range    Hold Specimen JIC    CBC with platelets and differential     Status: Abnormal   Result Value Ref Range    WBC Count 4.4 4.0 - 11.0 10e3/uL    RBC Count 4.45 3.80 - 5.20 10e6/uL    Hemoglobin 11.5 (L) 11.7 - 15.7 g/dL    Hematocrit 37.6 35.0 - 47.0 %    MCV 85 78 - 100 fL    MCH 25.8 (L) 26.5 - 33.0 pg    MCHC 30.6 (L) 31.5 - 36.5 g/dL    RDW 14.6 10.0 - 15.0 %    Platelet Count 282 150 - 450 10e3/uL    % Neutrophils 35 %    % Lymphocytes 55 %    % Monocytes 7 %    % Eosinophils 2 %    % Basophils 1 %    % Immature Granulocytes 0 %    NRBCs per 100 WBC 0 <1 /100    Absolute Neutrophils 1.5 (L) 1.6 - 8.3 10e3/uL    Absolute Lymphocytes 2.5 0.8 - 5.3 10e3/uL    Absolute Monocytes 0.3 0.0 - 1.3 10e3/uL    Absolute Eosinophils 0.1 0.0 - 0.7 10e3/uL    Absolute Basophils 0.0 0.0 - 0.2 10e3/uL    Absolute Immature Granulocytes 0.0 <=0.4 10e3/uL    Absolute NRBCs 0.0 10e3/uL   Glucose by meter     Status: Abnormal    Result Value Ref Range    GLUCOSE BY METER POCT 308 (H) 70 - 99 mg/dL   Lipase     Status: Normal   Result Value Ref Range    Lipase 23 13 - 60 U/L   HCG qualitative urine     Status: Normal   Result Value Ref Range    hCG Urine Qualitative Negative Negative   Ketone Beta-Hydroxybutyrate Quantitative     Status: Normal   Result Value Ref Range    Ketone (Beta-Hydroxybutyrate) Quantitative 0.0 0.0 - 0.6 mmol/L   Glucose by meter     Status: Abnormal   Result Value Ref Range    GLUCOSE BY METER POCT 179 (H) 70 - 99 mg/dL   Wet prep     Status: Abnormal    Specimen: Vagina; Swab   Result Value Ref Range    Trichomonas Absent Absent    Yeast Absent Absent    Clue Cells Present (A) Absent    WBCs/high power field 1+ (A) None   CBC with platelets differential     Status: Abnormal    Narrative    The following orders were created for panel order CBC with platelets differential.  Procedure                               Abnormality         Status                     ---------                               -----------         ------                     CBC with platelets and d...[[id number]]  Abnormal            Final result                 Please view results for these tests on the individual orders.     Medications   0.9% sodium chloride BOLUS (0 mLs Intravenous Stopped 9/13/22 1206)   0.9% sodium chloride BOLUS (0 mLs Intravenous Stopped 9/13/22 1440)   cefTRIAXone (ROCEPHIN) 2 g vial to attach to  ml bag for ADULTS or NS 50 ml bag for PEDS (0 g Intravenous Stopped 9/13/22 1335)   sodium chloride (PF) 0.9% PF flush 69 mL (69 mLs Intravenous Given 9/13/22 1324)   iopamidol (ISOVUE-370) solution 77 mL (77 mLs Intravenous Given 9/13/22 1324)        Assessments & Plan (with Medical Decision Making)   22 year old female with history of insulin-dependent diabetes to the emergency department with diffuse abdominal pain, nausea, and hyperglycemia.  She has had ketones intermittently over the past 2 weeks with intermittent  high blood sugars of unknown cause.  She denies dietary indiscretion or insulin noncompliance.  In the emergency department, patient's vital signs are normal.  She complains of abdominal pain but has no focal tenderness on exam.  Laboratory evaluation is essentially unremarkable.  She has no ketones.  No electrolyte abnormality.  No leukocytosis.  She does have some pyuria but minimal to no urinary tract infection symptoms.  She does report a history of urinary tract infection including cystitis and pyelonephritis with out significant classic UTI symptoms in the past.  As such, the patient was given 2 g of ceftriaxone.  Abdominal CT also obtained to evaluate for intra-abdominal pathology.  There are some inflammatory change in the pelvis that may represent PID.  Her pelvic examination is not overly convincing for PID but with CT findings, will treat with metronidazole and doxycycline.  Pelvic and urinary cultures are pending.  The patient does not have any significant abdominal pain.  She does not have adnexal tenderness on physical examination so do not feel that pelvic ultrasound would be informative.  Her CT scan does raise question of endometriosis.  With the inflammatory change in the pelvis concerning for PID and the findings of possible endometriosis, I feel that gynecology follow-up is warranted.  Patient was asked to follow-up with either her gynecologist or the [place] gynecologist in the next 1 to 2 weeks.  Return precautions provided.  Patient appears clinically well and appropriate for outpatient management.    I have reviewed the nursing notes. I have reviewed the findings, diagnosis, plan and need for follow up with the patient.    Discharge Medication List as of 9/13/2022  3:30 PM      START taking these medications    Details   doxycycline hyclate (VIBRAMYCIN) 100 MG capsule Take 1 capsule (100 mg) by mouth 2 times daily for 14 days, Disp-28 capsule, R-0, E-Prescribe      metroNIDAZOLE (FLAGYL)  500 MG tablet Take 1 tablet (500 mg) by mouth 2 times daily for 14 days, Disp-28 tablet, R-0, E-PrescribeEat yogurt or cottage cheese daily to prevent diarrhea that can be caused by taking this medication.      ondansetron (ZOFRAN ODT) 4 MG ODT tab Take 1 tablet (4 mg) by mouth every 8 hours as needed for nausea, Disp-10 tablet, R-0, E-Prescribe             Final diagnoses:   Type 1 diabetes mellitus with hyperglycemia (H)   PID (pelvic inflammatory disease)     Chart documentation was completed with Dragon voice-recognition software. Even though reviewed, this chart may still contain some grammatical, spelling, and word errors.     --  Pepito Yeh Md  [facility] EMERGENCY DEPARTMENT  9/13/2022     Pepito Yeh MD  09/13/22 1912

## 2022-09-14 ENCOUNTER — TELEPHONE (OUTPATIENT)
Dept: EMERGENCY MEDICINE | Facility: CLINIC | Age: 22
End: 2022-09-14

## 2022-09-14 DIAGNOSIS — A49.1 GBS (GROUP B STREPTOCOCCUS) INFECTION: ICD-10-CM

## 2022-09-14 LAB
C TRACH DNA SPEC QL NAA+PROBE: POSITIVE
N GONORRHOEA DNA SPEC QL NAA+PROBE: NEGATIVE

## 2022-09-14 NOTE — TELEPHONE ENCOUNTER
[facility] Emergency Department/Urgent Care Lab result notification:    Reason for call  Notify of lab results, assess symptoms,  review ED providers recommendations (if necessary) and advise per ED lab result f/u protocol.    Lab result  Final Chlamydia trachomatis PCR on 9/14/22 is POSITIVE for C. trachomatis rRNA by transcription mediated amplification.   Antibiotic's administered while Patient in the [facility] Emergency Dept [if applicable]:  Rocephin 2 g x 1 IV  [facility] ED discharge antibiotic[if applicable]: Doxycycline 100 mg PO tablet, 1 tablet (100 mg) by mouth 2 times daily for 14 days   Recommendations in treatment per [facility] ED Lab result Chlamydia trachomatis protocol.   4:39p  Unable to leave voicemail message requesting a call back to [phone] between 9 a.m. and 5:30 p.m. for patient's ED/UC lab results.  Sent Desti information, patient viewed results.      Iris Lynn RN  [facility] Result RN  [facility] Emergency Dept Lab Result RN  Ph# [phone]

## 2022-09-15 PROBLEM — A49.1 GBS (GROUP B STREPTOCOCCUS) INFECTION: Status: ACTIVE | Noted: 2022-09-15

## 2022-09-15 LAB
BACTERIA UR CULT: ABNORMAL
BACTERIA UR CULT: ABNORMAL

## 2022-09-15 NOTE — TELEPHONE ENCOUNTER
Former[facility] Emergency Department Lab result notification    [place] ED lab result protocol used  Urine culture & chlamydia protocols    Reason for call  Notify of lab results, assess symptoms,  review ED providers recommendations/discharge instructions (if necessary) and advise per ED lab result f/u protocol    Lab Result (including Rx patient on, if applicable)  Final Urine Culture Report on 9/15/22  [facility] Emergency Dept discharge antibiotic prescribed: Doxycycline 100 mg PO tablet, 1 tablet (100 mg) by mouth 2 times daily for 14 days and Flagyl  #1. Bacteria, >100,000 CFU/ML Streptococcus agalactiae B,  is [NOT TESTED] to antibiotic.   Recommendations in treatment per [facility] ED lab result Urine Culture protocol.    Information table from Emergency Dept Provider visit on 9/13/22  Symptoms reported at ED visit (Chief complaint, HPI) Chief Complaint   Patient presents with     Abdominal Pain     Hyperglycemia      HPI  Myriam Wlyie is a 22 year old female with a history of insulin-dependent diabetes, DKA, C. difficile colitis, PID, pyelonephritis who presents to the emergency department with concern for high blood sugars, diffuse abdominal pain, and nausea with vomiting.  Patient states for the past 2 weeks, she has had episodes of blood sugars into the 3 and 400s for reasons that she cannot identify.  She has had diffuse abdominal pain that she describes as cramping.  She has had nausea and did vomit once in the past few days.  Her emesis consisted of gastric contents.  She had a normal bowel movement this morning.  She denies any dysuria, urgency, or frequency.  Her last menstrual period was 1 month ago.  She denies any vaginal bleeding, discharge, or pelvic pain.  She denies any fever, cough, or dyspnea.  She denies any chest pain.  She checks her blood sugars with fingersticks and injects insulin.  She has a Dexcom which she is not using.     Significant Medical hx, if  applicable (i.e. CKD, diabetes) DMI, hx c.diff, hx PID, Hx pyelonephritis   Allergies No Known Allergies   Weight, if applicable Wt Readings from Last 2 Encounters:   09/13/22 56.7 kg (125 lb)   07/27/22 59.1 kg (130 lb 3.2 oz)      Coumadin/Warfarin [Yes /No] NO   Creatinine Level (mg/dl) Creatinine   Date Value Ref Range Status   09/13/2022 0.65 0.51 - 0.95 mg/dL Final   04/18/2021 0.75 0.50 - 1.00 mg/dL Final      Creatinine clearance (ml/min), if applicable Serum creatinine: 0.65 mg/dL 09/13/22 0929  Estimated creatinine clearance: 123.6 mL/min   Pregnant (Yes/No/NA) Negative HCG on 9/13/22   Breastfeeding (Yes/No/NA) Unknown - positive pregnancy 1/1/22   ED providers Impression and Plan (applicable information) Assessments & Plan (with Medical Decision Making)   22 year old female with history of insulin-dependent diabetes to the emergency department with diffuse abdominal pain, nausea, and hyperglycemia.  She has had ketones intermittently over the past 2 weeks with intermittent high blood sugars of unknown cause.  She denies dietary indiscretion or insulin noncompliance.  In the emergency department, patient's vital signs are normal.  She complains of abdominal pain but has no focal tenderness on exam.  Laboratory evaluation is essentially unremarkable.  She has no ketones.  No electrolyte abnormality.  No leukocytosis.  She does have some pyuria but minimal to no urinary tract infection symptoms.  She does report a history of urinary tract infection including cystitis and pyelonephritis with out significant classic UTI symptoms in the past.  As such, the patient was given 2 g of ceftriaxone.  Abdominal CT also obtained to evaluate for intra-abdominal pathology.  There are some inflammatory change in the pelvis that may represent PID.  Her pelvic examination is not overly convincing for PID but with CT findings, will treat with metronidazole and doxycycline.  Pelvic and urinary cultures are pending.  The  patient does not have any significant abdominal pain.  She does not have adnexal tenderness on physical examination so do not feel that pelvic ultrasound would be informative.  Her CT scan does raise question of endometriosis.  With the inflammatory change in the pelvis concerning for PID and the findings of possible endometriosis, I feel that gynecology follow-up is warranted.  Patient was asked to follow-up with either her gynecologist or the [facility] gynecologist in the next 1 to 2 weeks.  Return precautions provided.  Patient appears clinically well and appropriate for outpatient management.     I have reviewed the nursing notes. I have reviewed the findings, diagnosis, plan and need for follow up with the patient.         Discharge Medication List as of 9/13/2022  3:30 PM           START taking these medications     Details   doxycycline hyclate (VIBRAMYCIN) 100 MG capsule Take 1 capsule (100 mg) by mouth 2 times daily for 14 days, Disp-28 capsule, R-0, E-Prescribe       metroNIDAZOLE (FLAGYL) 500 MG tablet Take 1 tablet (500 mg) by mouth 2 times daily for 14 days, Disp-28 tablet, R-0, E-PrescribeEat yogurt or cottage cheese daily to prevent diarrhea that can be caused by taking this medication.       ondansetron (ZOFRAN ODT) 4 MG ODT tab Take 1 tablet (4 mg) by mouth every 8 hours as needed for nausea, Disp-10 tablet, R-0, E-Prescribe                 Final diagnoses:   Type 1 diabetes mellitus with hyperglycemia (H)   PID (pelvic inflammatory disease)      Chart documentation was completed with Dragon voice-recognition software. Even though reviewed, this chart may still contain some grammatical, spelling, and word errors.     --  Pepito Yeh Md  formerly [facility] EMERGENCY DEPARTMENT  9/13/2022     Pepito Yeh MD  09/13/22 1912     ED diagnosis  Type 1 diabetes mellitus with hyperglycemia (H)  PID (pelvic inflammatory disease)   ED provider  Pepito Yeh MD           RN Assessment (Patient s current  Symptoms), include time called.    11:07 AM - call attempt to patient unable to leave voice message- with mom no consent but Left voicemail message requesting a call back to [facility] ED Lab Result RN at [phone].  RN is available every day between 9 a.m. and 5:30 p.m.  See Telephone encounter.    As these are STI results included with our message we will ask to speak with patient directly      Guerita Silver RN  [facility]  Emergency Dept Lab Result RN  Ph# [phone]     Copy of Lab result   Urine Culture  Order: [id number] - Reflex for Order [id number]   Status: Final result     Visible to patient: Yes (seen)    Specimen Information: Urine, Clean Catch         2 Result Notes    Culture >100,000 CFU/mL Streptococcus agalactiae (Group B Streptococcus) Abnormal     This organism is susceptible to ampicillin, penicillin, vancomycin and the cephalosporins. If treatment is required AND your patient is allergic to penicillin, contact the Microbiology Lab within 5 days to request susceptibility testing.   This organism may be significant in OB patients, however, it is part of the normal urogenital raquel.   10,000-50,000 CFU/mL Mixture of urogenital raquel            Resulting Agency: [facility]           Specimen Collected: 09/13/22  9:31 AM Last Resulted: 09/15/22  8:23 AM

## 2022-09-15 NOTE — TELEPHONE ENCOUNTER
[facility] Emergency Department/Urgent Care Lab result notification     Patient/parent Name  Myriam    RN Assessment (Patient s current Symptoms), include time called.  [Insert Left message here if message left]  Denies any Uti sx's (frequency with urination, urgency, pain or burning with urination)  Abdominal pain is a little bit better  Mouth is dry  A little bit nauseated but improved since ED visit  HCG qual negative on 9/13/22    Lab result (if applicable):  Final Urine Culture Report on 9/15/22  [facility] Emergency Dept discharge antibiotic prescribed: Doxycycline 100 mg PO tablet, 1 tablet (100 mg) by mouth 2 times daily for 14 days and Flagyl  #1. Bacteria, >100,000 CFU/ML Streptococcus agalactiae B,  is [NOT TESTED] to antibiotic.   Recommendations in treatment per [facility] ED lab result Urine Culture protocol.    Final Chlamydia trachomatis PCR on 9/14/22 is POSITIVE for C. trachomatis rRNA by transcription mediated amplification.   Antibiotic's administered while Patient in the [facility] Emergency Dept [if applicable]:  Rocephin 2 g x 1 IV  [facility] ED discharge antibiotic[if applicable]: Doxycycline 100 mg PO tablet, 1 tablet (100 mg) by mouth 2 times daily for 14 days   Recommendations in treatment per [facility] ED Lab result Chlamydia trachomatis protocol.     RN Recommendations/Instructions per [place] ED lab result protocol  Patient notified of lab result and treatment recommendations.  No treatment   She was notified of the positive Chlamydia result.  Information regarding Chlamydia was sent via [employer] on 9/14/22 and she has read this information.    Please Contact your PCP clinic or return to the Emergency department if your:    Symptoms do not improve after 3 days on antibiotic.    Symptoms do not resolve after completing antibiotic.    Symptoms worsen or other concerning symptom's.    PCP follow-up Questions asked: NO    Олег Kaminski RN    [facility]  Emergency Dept Lab Result RN  Ph# [phone]

## 2022-09-15 NOTE — RESULT ENCOUNTER NOTE
Notified of results.  Denies any UTI sx's. No change in treatment  Confirmed she is taking the Doxycycline and Metrnidazole

## 2022-09-19 ENCOUNTER — HOSPITAL ENCOUNTER (EMERGENCY)
Facility: CLINIC | Age: 22
Discharge: HOME | End: 2022-09-19
Attending: EMERGENCY MEDICINE | Admitting: EMERGENCY MEDICINE
Payer: COMMERCIAL

## 2022-09-19 VITALS
WEIGHT: 125 LBS | TEMPERATURE: 98.2 F | HEART RATE: 85 BPM | BODY MASS INDEX: 25.25 KG/M2 | SYSTOLIC BLOOD PRESSURE: 138 MMHG | RESPIRATION RATE: 16 BRPM | OXYGEN SATURATION: 100 % | DIASTOLIC BLOOD PRESSURE: 95 MMHG

## 2022-09-19 DIAGNOSIS — E10.65 TYPE 1 DIABETES MELLITUS WITH HYPERGLYCEMIA (H): ICD-10-CM

## 2022-09-19 DIAGNOSIS — R11.0 NAUSEA: ICD-10-CM

## 2022-09-19 LAB
ALBUMIN SERPL BCG-MCNC: 4.1 G/DL (ref 3.5–5.2)
ALBUMIN UR-MCNC: NEGATIVE MG/DL
ALP SERPL-CCNC: 133 U/L (ref 35–104)
ALT SERPL W P-5'-P-CCNC: 12 U/L (ref 10–35)
ANION GAP SERPL CALCULATED.3IONS-SCNC: 13 MMOL/L (ref 7–15)
APPEARANCE UR: CLEAR
AST SERPL W P-5'-P-CCNC: 21 U/L (ref 10–35)
BASOPHILS # BLD AUTO: 0 10E3/UL (ref 0–0.2)
BASOPHILS NFR BLD AUTO: 0 %
BILIRUB SERPL-MCNC: 0.5 MG/DL
BILIRUB UR QL STRIP: NEGATIVE
BUN SERPL-MCNC: 8.5 MG/DL (ref 6–20)
CALCIUM SERPL-MCNC: 9 MG/DL (ref 8.6–10)
CHLORIDE SERPL-SCNC: 100 MMOL/L (ref 98–107)
COLOR UR AUTO: ABNORMAL
CREAT SERPL-MCNC: 0.68 MG/DL (ref 0.51–0.95)
DEPRECATED HCO3 PLAS-SCNC: 22 MMOL/L (ref 22–29)
EOSINOPHIL # BLD AUTO: 0.1 10E3/UL (ref 0–0.7)
EOSINOPHIL NFR BLD AUTO: 1 %
ERYTHROCYTE [DISTWIDTH] IN BLOOD BY AUTOMATED COUNT: 14.7 % (ref 10–15)
GFR SERPL CREATININE-BSD FRML MDRD: >90 ML/MIN/1.73M2
GLUCOSE BLDC GLUCOMTR-MCNC: 383 MG/DL (ref 70–99)
GLUCOSE SERPL-MCNC: 448 MG/DL (ref 70–99)
GLUCOSE UR STRIP-MCNC: >=1000 MG/DL
HCG SERPL QL: NEGATIVE
HCT VFR BLD AUTO: 39.3 % (ref 35–47)
HGB BLD-MCNC: 12.1 G/DL (ref 11.7–15.7)
HGB UR QL STRIP: NEGATIVE
IMM GRANULOCYTES # BLD: 0 10E3/UL
IMM GRANULOCYTES NFR BLD: 0 %
KETONES BLD-SCNC: 0.2 MMOL/L (ref 0–0.6)
KETONES UR STRIP-MCNC: 10 MG/DL
LEUKOCYTE ESTERASE UR QL STRIP: ABNORMAL
LIPASE SERPL-CCNC: 18 U/L (ref 13–60)
LYMPHOCYTES # BLD AUTO: 2.3 10E3/UL (ref 0.8–5.3)
LYMPHOCYTES NFR BLD AUTO: 43 %
MCH RBC QN AUTO: 25.8 PG (ref 26.5–33)
MCHC RBC AUTO-ENTMCNC: 30.8 G/DL (ref 31.5–36.5)
MCV RBC AUTO: 84 FL (ref 78–100)
MONOCYTES # BLD AUTO: 0.2 10E3/UL (ref 0–1.3)
MONOCYTES NFR BLD AUTO: 4 %
NEUTROPHILS # BLD AUTO: 2.8 10E3/UL (ref 1.6–8.3)
NEUTROPHILS NFR BLD AUTO: 52 %
NITRATE UR QL: NEGATIVE
NRBC # BLD AUTO: 0 10E3/UL
NRBC BLD AUTO-RTO: 0 /100
PH UR STRIP: 6.5 [PH] (ref 5–7)
PLATELET # BLD AUTO: 254 10E3/UL (ref 150–450)
POTASSIUM SERPL-SCNC: 4 MMOL/L (ref 3.4–5.3)
PROT SERPL-MCNC: 7.7 G/DL (ref 6.4–8.3)
RBC # BLD AUTO: 4.69 10E6/UL (ref 3.8–5.2)
RBC URINE: 1 /HPF
SODIUM SERPL-SCNC: 135 MMOL/L (ref 136–145)
SP GR UR STRIP: 1.03 (ref 1–1.03)
SQUAMOUS EPITHELIAL: <1 /HPF
UROBILINOGEN UR STRIP-MCNC: NORMAL MG/DL
WBC # BLD AUTO: 5.4 10E3/UL (ref 4–11)
WBC URINE: 1 /HPF

## 2022-09-19 PROCEDURE — 84703 CHORIONIC GONADOTROPIN ASSAY: CPT | Performed by: EMERGENCY MEDICINE

## 2022-09-19 PROCEDURE — 81001 URINALYSIS AUTO W/SCOPE: CPT | Performed by: EMERGENCY MEDICINE

## 2022-09-19 PROCEDURE — 83690 ASSAY OF LIPASE: CPT | Performed by: EMERGENCY MEDICINE

## 2022-09-19 PROCEDURE — 82010 KETONE BODYS QUAN: CPT | Performed by: EMERGENCY MEDICINE

## 2022-09-19 PROCEDURE — 80053 COMPREHEN METABOLIC PANEL: CPT | Performed by: EMERGENCY MEDICINE

## 2022-09-19 PROCEDURE — 99283 EMERGENCY DEPT VISIT LOW MDM: CPT | Performed by: EMERGENCY MEDICINE

## 2022-09-19 PROCEDURE — 258N000003 HC RX IP 258 OP 636: Performed by: EMERGENCY MEDICINE

## 2022-09-19 PROCEDURE — 85025 COMPLETE CBC W/AUTO DIFF WBC: CPT | Performed by: EMERGENCY MEDICINE

## 2022-09-19 PROCEDURE — 36415 COLL VENOUS BLD VENIPUNCTURE: CPT | Performed by: EMERGENCY MEDICINE

## 2022-09-19 PROCEDURE — 99283 EMERGENCY DEPT VISIT LOW MDM: CPT | Mod: 25 | Performed by: EMERGENCY MEDICINE

## 2022-09-19 PROCEDURE — 96360 HYDRATION IV INFUSION INIT: CPT | Performed by: EMERGENCY MEDICINE

## 2022-09-19 RX ADMIN — SODIUM CHLORIDE 1000 ML: 9 INJECTION, SOLUTION INTRAVENOUS at 17:54

## 2022-09-19 ASSESSMENT — ACTIVITIES OF DAILY LIVING (ADL): ADLS_ACUITY_SCORE: 33

## 2022-09-19 NOTE — ED PROVIDER NOTES
[facility] EMERGENCY DEPARTMENT ([facility])  9/19/22    History     Chief Complaint   Patient presents with     Hyperglycemia     HPI  Myriam Wylie is a 22 year old female with PMH significant for insulin-dependent diabetes, DKA, and PID who presents to the ED not feeling well.  She was seen here on the 13th of this month with some concern for possible DKA.  [place] she did not have DKA but she was diagnosed with PID.  She was treated for this with doxycycline, metronidazole-and chlamydia did come back positive.  She states that since starting the medications her sugars have been pretty normal, but she continues to feel thirsty a lot, drinking a lot, urinating a lot.  She states she is also having some intermittent mild diarrhea, some nausea.  She is having to use Zofran at times.  She states that she just wanted to make sure everything was okay given that the symptoms have not really improved.  No fever.  No cough or shortness of breath.  No dysuria.  She does note that her menses is a little bit late, but that she recently had a negative pregnancy test.    This part of the medical record was transcribed by Cristi Meredith, Medical Scribe, from a dictation done by Melida Riggins MD.     Past Medical History  Past Medical History:   Diagnosis Date     C. difficile colitis      Diabetes type 1, uncontrolled (H)      DKA (diabetic ketoacidosis) (H)      PID (acute pelvic inflammatory disease)      Past Surgical History:   Procedure Laterality Date     COLONOSCOPY N/A 9/18/2019    Procedure: COLONOSCOPY;  Surgeon: Jeremi Banks MD;  Location: [facility]      ESOPHAGOSCOPY, GASTROSCOPY, DUODENOSCOPY (EGD), COMBINED N/A 9/18/2019    Procedure: Upper endoscopy and colonoscopy with biopsy;  Surgeon: Jeremi Banks MD;  Location: [facility]      acetone, Urine, test STRP  blood glucose monitoring (ACCU-CHEK FASTCLIX) lancets  blood glucose monitoring (ACCU-CHEK HENRI SMARTVIEW) meter device  kit  blood glucose monitoring (ACCU-CHEK SMARTVIEW) test strip  doxycycline hyclate (VIBRAMYCIN) 100 MG capsule  Glucagon 0.5 MG/0.1ML SOSY  insulin aspart (NOVOLOG PEN) 100 UNIT/ML pen  insulin aspart (NOVOLOG PEN) 100 UNIT/ML pen  insulin glargine (BASAGLAR KWIKPEN) 100 UNIT/ML pen  insulin pen needle (BD HENRI U/F) 32G X 4 MM  metroNIDAZOLE (FLAGYL) 500 MG tablet  Prenatal Vit-Fe Fumarate-FA (PRENATAL MULTIVITAMIN W/IRON) 27-0.8 MG tablet  pyridOXINE (VITAMIN B6) 25 MG tablet      No Known Allergies  Family History  Family History   Problem Relation Age of Onset     Diabetes No family hx of         type 1 diabetes or autoimmunity     Social History   Social History     Tobacco Use     Smoking status: Never Smoker     Smokeless tobacco: Never Used   Substance Use Topics     Alcohol use: Yes     Drug use: Yes     Types: Marijuana      Past medical history, past surgical history, medications, allergies, family history, and social history were reviewed with the patient. No additional pertinent items.       Review of Systems  A complete review of systems was performed with pertinent positives and negatives noted in the HPI, and all other systems negative.    Physical Exam   BP: 117/77  Pulse: 80  Temp: 98.2  F (36.8  C)  Resp: 16  Weight: 56.7 kg (125 lb)  SpO2: 100 %  Physical Exam  Constitutional:       General: She is not in acute distress.     Appearance: She is not diaphoretic.   HENT:      Head: Atraumatic.   Eyes:      General: No scleral icterus.  Cardiovascular:      Heart sounds: Normal heart sounds.   Pulmonary:      Effort: No respiratory distress.      Breath sounds: Normal breath sounds.   Abdominal:      Palpations: Abdomen is soft.      Tenderness: There is no abdominal tenderness.   Musculoskeletal:         General: No tenderness.   Skin:     General: Skin is warm.         ED Course      Procedures                     Results for orders placed or performed during the hospital encounter of 09/19/22    Ketone Beta-Hydroxybutyrate Quantitative     Status: Normal   Result Value Ref Range    Ketone (Beta-Hydroxybutyrate) Quantitative 0.2 0.0 - 0.6 mmol/L   Comprehensive metabolic panel     Status: Abnormal   Result Value Ref Range    Sodium 135 (L) 136 - 145 mmol/L    Potassium 4.0 3.4 - 5.3 mmol/L    Chloride 100 98 - 107 mmol/L    Carbon Dioxide (CO2) 22 22 - 29 mmol/L    Anion Gap 13 7 - 15 mmol/L    Urea Nitrogen 8.5 6.0 - 20.0 mg/dL    Creatinine 0.68 0.51 - 0.95 mg/dL    Calcium 9.0 8.6 - 10.0 mg/dL    Glucose 448 (H) 70 - 99 mg/dL    Alkaline Phosphatase 133 (H) 35 - 104 U/L    AST 21 10 - 35 U/L    ALT 12 10 - 35 U/L    Protein Total 7.7 6.4 - 8.3 g/dL    Albumin 4.1 3.5 - 5.2 g/dL    Bilirubin Total 0.5 <=1.2 mg/dL    GFR Estimate >90 >60 mL/min/1.73m2   Lipase     Status: Normal   Result Value Ref Range    Lipase 18 13 - 60 U/L   UA with Microscopic reflex to Culture     Status: Abnormal    Specimen: Urine, Midstream   Result Value Ref Range    Color Urine Light Yellow Colorless, Straw, Light Yellow, Yellow    Appearance Urine Clear Clear    Glucose Urine >=1000 (A) Negative mg/dL    Bilirubin Urine Negative Negative    Ketones Urine 10  (A) Negative mg/dL    Specific Gravity Urine 1.029 1.003 - 1.035    Blood Urine Negative Negative    pH Urine 6.5 5.0 - 7.0    Protein Albumin Urine Negative Negative mg/dL    Urobilinogen Urine Normal Normal, 2.0 mg/dL    Nitrite Urine Negative Negative    Leukocyte Esterase Urine Trace (A) Negative    RBC Urine 1 <=2 /HPF    WBC Urine 1 <=5 /HPF    Squamous Epithelials Urine <1 <=1 /HPF    Narrative    Urine Culture not indicated   HCG qualitative pregnancy (blood)     Status: Normal   Result Value Ref Range    hCG Serum Qualitative Negative Negative   CBC with platelets and differential     Status: Abnormal   Result Value Ref Range    WBC Count 5.4 4.0 - 11.0 10e3/uL    RBC Count 4.69 3.80 - 5.20 10e6/uL    Hemoglobin 12.1 11.7 - 15.7 g/dL    Hematocrit 39.3 35.0 -  47.0 %    MCV 84 78 - 100 fL    MCH 25.8 (L) 26.5 - 33.0 pg    MCHC 30.8 (L) 31.5 - 36.5 g/dL    RDW 14.7 10.0 - 15.0 %    Platelet Count 254 150 - 450 10e3/uL    % Neutrophils 52 %    % Lymphocytes 43 %    % Monocytes 4 %    % Eosinophils 1 %    % Basophils 0 %    % Immature Granulocytes 0 %    NRBCs per 100 WBC 0 <1 /100    Absolute Neutrophils 2.8 1.6 - 8.3 10e3/uL    Absolute Lymphocytes 2.3 0.8 - 5.3 10e3/uL    Absolute Monocytes 0.2 0.0 - 1.3 10e3/uL    Absolute Eosinophils 0.1 0.0 - 0.7 10e3/uL    Absolute Basophils 0.0 0.0 - 0.2 10e3/uL    Absolute Immature Granulocytes 0.0 <=0.4 10e3/uL    Absolute NRBCs 0.0 10e3/uL   Glucose by meter     Status: Abnormal   Result Value Ref Range    GLUCOSE BY METER POCT 383 (H) 70 - 99 mg/dL   CBC with platelets differential     Status: Abnormal    Narrative    The following orders were created for panel order CBC with platelets differential.  Procedure                               Abnormality         Status                     ---------                               -----------         ------                     CBC with platelets and d...[[id number]]  Abnormal            Final result                 Please view results for these tests on the individual orders.     Medications   0.9% sodium chloride BOLUS (1,000 mLs Intravenous New Bag 9/19/22 2612)        Assessments & Plan (with Medical Decision Making)   She denies any abnormal vaginal discharge, pelvic pain.  Mainly she is concerned because she feels that she is still thirsty, drinking a lot, urinating a lot, and states that she has ongoing nausea.  She does admit that her sugars tend to run high, upwards of high 200s at baseline.  She does state they have been a little bit better lately though.  Sugar is high here at 383.  She states that she is already corrected for this here.  I do suspect that her nausea quite possibly is related to her doxycycline.  I did offer her observation admission, but she feels comfortable  discharging and trying to eat more with taking her doxycycline dose.  She strongly encouraged to monitor her sugars closely, return with any worsening or concerns.  She does not have evidence of DKA at this time.  She verbalizes understanding and is agreeable to the plan.  She states she does have a week left of antibiotics, is encouraged to take all of her doses.    Dictation Disclaimer: Some of this Note has been completed with voice-recognition dictation software. Although errors are generally corrected real-time, there is the potential for a rare error to be present in the completed chart.      I have reviewed the nursing notes. I have reviewed the findings, diagnosis, plan and need for follow up with the patient.    New Prescriptions    No medications on file       Final diagnoses:   Nausea   Type 1 diabetes mellitus with hyperglycemia (H)     I, Cristi Meredith, am serving as a trained medical scribe to document services personally performed by Melida Riggins MD, based on the provider's statements to me.     I, Melida Riggins MD, was physically present and have reviewed and verified the accuracy of this note documented by Cristi Meredith.    --  Melida Riggins MD  [facility] EMERGENCY DEPARTMENT  9/19/2022     Melida Riggins MD  09/19/22 9799

## 2022-09-19 NOTE — LETTER
September 19, 2022      To Whom It May Concern:      Myriam Wylie was seen in our Emergency Department today, 09/19/22.  I expect her condition to improve over the next 1 day.  She may return to work/school when improved.    Sincerely,        Melida Riggins MD

## 2022-09-19 NOTE — ED TRIAGE NOTES
Pt arrives ambulatory to triage w/ concern for DKA. Pt states symptoms of DKA including: n/v/d, dehydration, polydipsia, polyuria, muscle cramps. Also requesting serum hcg test in triage. BG, urine ketones @ home negative.      Triage Assessment     Row Name 09/19/22 7745       Triage Assessment (Adult)    Airway WDL WDL       Respiratory WDL    Respiratory WDL WDL       Skin Circulation/Temperature WDL    Skin Circulation/Temperature WDL WDL       Cardiac WDL    Cardiac WDL WDL       Peripheral/Neurovascular WDL    Peripheral Neurovascular WDL WDL       Cognitive/Neuro/Behavioral WDL    Cognitive/Neuro/Behavioral WDL WDL

## 2022-09-19 NOTE — DISCHARGE INSTRUCTIONS
Your pregnancy test is negative. Try to eat more prior to taking your antibiotic. Monitor your sugars closely. Return with any worsening or concerns.

## 2022-09-21 ENCOUNTER — NURSE TRIAGE (OUTPATIENT)
Dept: NURSING | Facility: CLINIC | Age: 22
End: 2022-09-21

## 2022-09-21 NOTE — TELEPHONE ENCOUNTER
"Nurse Triage SBAR    Is this a 2nd Level Triage? YES, LICENSED PRACTITIONER REVIEW IS REQUIRED    Situation: Nausea    Background: Patient states she was seen is the ED a couple of days ago and last week. She states that she is a Type 1 diabetic and her blood sugar this morning is 230 and states she has already corrected with insulin.     Patient also states that \"I feel in my soul that I am pregnant and I think the baby is in my fallopian tube.\". The pregnancy test done in the ED was negative but she is wanting to have an ultrasound done. She states that she thinks she is dehydrated but states that she is peeing ok. States that zofran is not helping her nausea and that she has a headache. She denies vomiting this morning. Denies fever, denies abdominal pain.     Assessment: Nausea    Protocol Recommended Disposition:   See in Office Today or Tomorrow    Recommendation:     Patient is not established with a primary care physician. Patient advised to go to urgent care this morning. Patient stated she would probably just go back to the ED.     Writer also encouraged patient to establish with a primary care doctor and she was transferred to scheduling to do this.      NO     LINDSEY ENCINAS RN      Does the patient meet one of the following criteria for ADS visit consideration? No    Reason for Disposition    Patient wants to be seen    Additional Information    Negative: Shock suspected (e.g., cold/pale/clammy skin, too weak to stand, low BP, rapid pulse)    Negative: Sounds like a life-threatening emergency to the triager    Negative: Nausea or vomiting and pregnancy < 20 weeks    Negative: Menstrual Period - Missed or Late (i.e., pregnancy suspected)    Negative: Heat exhaustion suspected (i.e., dehydration from heat exposure)    Negative: Anxiety or stress suspected (i.e., nausea with anxiety attacks or stressful situations)    Negative: Traumatic Brain Injury (TBI) suspected    Negative: Vomiting occurs    " Negative: Other symptom is present, see that guideline.  (e.g., chest pain, headache, dizziness, abdominal pain, colds, sore throat, etc.).    Negative: Unable to walk, or can only walk with assistance (e.g., requires support)    Negative: Difficulty breathing    Negative: Insulin-dependent diabetes (Type I) and glucose > 400 mg/dL (22 mmol/L)    Negative: Drinking very little and dehydration suspected (e.g., no urine > 12 hours, very dry mouth, very lightheaded)    Negative: Patient sounds very sick or weak to the triager    Negative: Fever > 104 F  (40 C)    Negative: Fever > 101 F  (38.3 C) and over 60 years of age    Negative: Fever > 100.0 F  (37.8 C) and bedridden (e.g., nursing home patient, CVA, chronic illness, recovering from surgery)    Negative: Fever > 100.0 F  (37.8 C) and diabetes mellitus or weak immune system (e.g., HIV positive, cancer chemo, splenectomy, chronic steroids)    Negative: Taking any of the following medications: digoxin (Lanoxin), lithium, theophylline, phenytoin (Dilantin)    Negative: Yellowish color of the skin or white of the eye (i.e., jaundice)    Negative: Fever present > 3 days (72 hours)    Protocols used: NAUSEA-A-OH

## 2022-10-31 ENCOUNTER — APPOINTMENT (OUTPATIENT)
Dept: ULTRASOUND IMAGING | Facility: CLINIC | Age: 22
End: 2022-10-31
Attending: EMERGENCY MEDICINE
Payer: COMMERCIAL

## 2022-10-31 ENCOUNTER — HOSPITAL ENCOUNTER (EMERGENCY)
Facility: CLINIC | Age: 22
Discharge: HOME | End: 2022-10-31
Attending: EMERGENCY MEDICINE | Admitting: EMERGENCY MEDICINE
Payer: COMMERCIAL

## 2022-10-31 ENCOUNTER — APPOINTMENT (OUTPATIENT)
Dept: CT IMAGING | Facility: CLINIC | Age: 22
End: 2022-10-31
Attending: NURSE PRACTITIONER
Payer: COMMERCIAL

## 2022-10-31 VITALS
DIASTOLIC BLOOD PRESSURE: 90 MMHG | TEMPERATURE: 98.4 F | SYSTOLIC BLOOD PRESSURE: 150 MMHG | HEART RATE: 90 BPM | OXYGEN SATURATION: 99 % | RESPIRATION RATE: 20 BRPM

## 2022-10-31 DIAGNOSIS — N73.0 PID (ACUTE PELVIC INFLAMMATORY DISEASE): ICD-10-CM

## 2022-10-31 DIAGNOSIS — N83.201 CYST OF RIGHT OVARY: ICD-10-CM

## 2022-10-31 LAB
ALBUMIN SERPL BCG-MCNC: 3.9 G/DL (ref 3.5–5.2)
ALBUMIN UR-MCNC: NEGATIVE MG/DL
ALP SERPL-CCNC: 167 U/L (ref 35–104)
ALT SERPL W P-5'-P-CCNC: <5 U/L (ref 10–35)
ANION GAP SERPL CALCULATED.3IONS-SCNC: 14 MMOL/L (ref 7–15)
APPEARANCE UR: CLEAR
AST SERPL W P-5'-P-CCNC: 18 U/L (ref 10–35)
BASOPHILS # BLD AUTO: 0 10E3/UL (ref 0–0.2)
BASOPHILS NFR BLD AUTO: 0 %
BILIRUB SERPL-MCNC: 0.5 MG/DL
BILIRUB UR QL STRIP: NEGATIVE
BUN SERPL-MCNC: 7.4 MG/DL (ref 6–20)
CALCIUM SERPL-MCNC: 8.9 MG/DL (ref 8.6–10)
CHLORIDE SERPL-SCNC: 103 MMOL/L (ref 98–107)
CLUE CELLS: ABNORMAL
COLOR UR AUTO: ABNORMAL
CREAT BLD-MCNC: 0.5 MG/DL (ref 0.5–1)
CREAT SERPL-MCNC: 0.65 MG/DL (ref 0.51–0.95)
DEPRECATED HCO3 PLAS-SCNC: 19 MMOL/L (ref 22–29)
EOSINOPHIL # BLD AUTO: 0 10E3/UL (ref 0–0.7)
EOSINOPHIL NFR BLD AUTO: 0 %
ERYTHROCYTE [DISTWIDTH] IN BLOOD BY AUTOMATED COUNT: 15.8 % (ref 10–15)
GFR SERPL CREATININE-BSD FRML MDRD: >60 ML/MIN/1.73M2
GFR SERPL CREATININE-BSD FRML MDRD: >90 ML/MIN/1.73M2
GLUCOSE BLDC GLUCOMTR-MCNC: 123 MG/DL (ref 70–99)
GLUCOSE BLDC GLUCOMTR-MCNC: 399 MG/DL (ref 70–99)
GLUCOSE SERPL-MCNC: 123 MG/DL (ref 70–99)
GLUCOSE UR STRIP-MCNC: NEGATIVE MG/DL
HCG SERPL QL: NEGATIVE
HCT VFR BLD AUTO: 37.7 % (ref 35–47)
HGB BLD-MCNC: 11.8 G/DL (ref 11.7–15.7)
HGB UR QL STRIP: NEGATIVE
IMM GRANULOCYTES # BLD: 0 10E3/UL
IMM GRANULOCYTES NFR BLD: 0 %
KETONES UR STRIP-MCNC: 40 MG/DL
LEUKOCYTE ESTERASE UR QL STRIP: NEGATIVE
LIPASE SERPL-CCNC: 11 U/L (ref 13–60)
LYMPHOCYTES # BLD AUTO: 2 10E3/UL (ref 0.8–5.3)
LYMPHOCYTES NFR BLD AUTO: 18 %
MCH RBC QN AUTO: 26.3 PG (ref 26.5–33)
MCHC RBC AUTO-ENTMCNC: 31.3 G/DL (ref 31.5–36.5)
MCV RBC AUTO: 84 FL (ref 78–100)
MONOCYTES # BLD AUTO: 0.6 10E3/UL (ref 0–1.3)
MONOCYTES NFR BLD AUTO: 5 %
NEUTROPHILS # BLD AUTO: 8.4 10E3/UL (ref 1.6–8.3)
NEUTROPHILS NFR BLD AUTO: 77 %
NITRATE UR QL: NEGATIVE
NRBC # BLD AUTO: 0 10E3/UL
NRBC BLD AUTO-RTO: 0 /100
PH UR STRIP: 6 [PH] (ref 5–7)
PLATELET # BLD AUTO: 215 10E3/UL (ref 150–450)
POTASSIUM SERPL-SCNC: 4.4 MMOL/L (ref 3.4–5.3)
PROT SERPL-MCNC: 7.5 G/DL (ref 6.4–8.3)
RADIOLOGIST FLAGS: NORMAL
RBC # BLD AUTO: 4.48 10E6/UL (ref 3.8–5.2)
RBC URINE: <1 /HPF
SODIUM SERPL-SCNC: 136 MMOL/L (ref 136–145)
SP GR UR STRIP: 1.01 (ref 1–1.03)
SQUAMOUS EPITHELIAL: 5 /HPF
TRANSITIONAL EPI: <1 /HPF
TRICHOMONAS, WET PREP: ABNORMAL
UROBILINOGEN UR STRIP-MCNC: NORMAL MG/DL
WBC # BLD AUTO: 11.1 10E3/UL (ref 4–11)
WBC URINE: 1 /HPF
WBC'S/HIGH POWER FIELD, WET PREP: ABNORMAL
YEAST, WET PREP: ABNORMAL

## 2022-10-31 PROCEDURE — 87210 SMEAR WET MOUNT SALINE/INK: CPT | Performed by: NURSE PRACTITIONER

## 2022-10-31 PROCEDURE — 82565 ASSAY OF CREATININE: CPT

## 2022-10-31 PROCEDURE — 36415 COLL VENOUS BLD VENIPUNCTURE: CPT | Performed by: EMERGENCY MEDICINE

## 2022-10-31 PROCEDURE — 250N000013 HC RX MED GY IP 250 OP 250 PS 637: Performed by: NURSE PRACTITIONER

## 2022-10-31 PROCEDURE — 250N000013 HC RX MED GY IP 250 OP 250 PS 637: Performed by: EMERGENCY MEDICINE

## 2022-10-31 PROCEDURE — 96372 THER/PROPH/DIAG INJ SC/IM: CPT | Mod: 59 | Performed by: EMERGENCY MEDICINE

## 2022-10-31 PROCEDURE — 99284 EMERGENCY DEPT VISIT MOD MDM: CPT | Performed by: EMERGENCY MEDICINE

## 2022-10-31 PROCEDURE — 76856 US EXAM PELVIC COMPLETE: CPT | Mod: 26 | Performed by: STUDENT IN AN ORGANIZED HEALTH CARE EDUCATION/TRAINING PROGRAM

## 2022-10-31 PROCEDURE — 76830 TRANSVAGINAL US NON-OB: CPT | Mod: 26 | Performed by: STUDENT IN AN ORGANIZED HEALTH CARE EDUCATION/TRAINING PROGRAM

## 2022-10-31 PROCEDURE — 81001 URINALYSIS AUTO W/SCOPE: CPT | Performed by: EMERGENCY MEDICINE

## 2022-10-31 PROCEDURE — 84703 CHORIONIC GONADOTROPIN ASSAY: CPT | Performed by: EMERGENCY MEDICINE

## 2022-10-31 PROCEDURE — 76830 TRANSVAGINAL US NON-OB: CPT

## 2022-10-31 PROCEDURE — 74177 CT ABD & PELVIS W/CONTRAST: CPT | Mod: 26 | Performed by: STUDENT IN AN ORGANIZED HEALTH CARE EDUCATION/TRAINING PROGRAM

## 2022-10-31 PROCEDURE — 85025 COMPLETE CBC W/AUTO DIFF WBC: CPT | Performed by: EMERGENCY MEDICINE

## 2022-10-31 PROCEDURE — 250N000011 HC RX IP 250 OP 636: Performed by: EMERGENCY MEDICINE

## 2022-10-31 PROCEDURE — 83690 ASSAY OF LIPASE: CPT | Performed by: EMERGENCY MEDICINE

## 2022-10-31 PROCEDURE — 99285 EMERGENCY DEPT VISIT HI MDM: CPT | Mod: 25

## 2022-10-31 PROCEDURE — 80053 COMPREHEN METABOLIC PANEL: CPT | Performed by: EMERGENCY MEDICINE

## 2022-10-31 PROCEDURE — 96374 THER/PROPH/DIAG INJ IV PUSH: CPT | Mod: 59

## 2022-10-31 PROCEDURE — 87591 N.GONORRHOEAE DNA AMP PROB: CPT | Performed by: NURSE PRACTITIONER

## 2022-10-31 PROCEDURE — 74177 CT ABD & PELVIS W/CONTRAST: CPT

## 2022-10-31 PROCEDURE — 82040 ASSAY OF SERUM ALBUMIN: CPT | Performed by: EMERGENCY MEDICINE

## 2022-10-31 RX ORDER — ACETAMINOPHEN 325 MG/1
975 TABLET ORAL ONCE
Status: COMPLETED | OUTPATIENT
Start: 2022-10-31 | End: 2022-10-31

## 2022-10-31 RX ORDER — DOXYCYCLINE 100 MG/1
100 CAPSULE ORAL 2 TIMES DAILY
Qty: 28 CAPSULE | Refills: 0 | Status: SHIPPED | OUTPATIENT
Start: 2022-10-31 | End: 2022-11-14

## 2022-10-31 RX ORDER — DOXYCYCLINE 100 MG/1
100 CAPSULE ORAL ONCE
Status: COMPLETED | OUTPATIENT
Start: 2022-10-31 | End: 2022-10-31

## 2022-10-31 RX ORDER — ONDANSETRON 2 MG/ML
4 INJECTION INTRAMUSCULAR; INTRAVENOUS ONCE
Status: COMPLETED | OUTPATIENT
Start: 2022-10-31 | End: 2022-10-31

## 2022-10-31 RX ORDER — METRONIDAZOLE 500 MG/1
500 TABLET ORAL 2 TIMES DAILY
Qty: 28 TABLET | Refills: 0 | Status: SHIPPED | OUTPATIENT
Start: 2022-10-31 | End: 2022-11-14

## 2022-10-31 RX ORDER — METRONIDAZOLE 500 MG/1
500 TABLET ORAL ONCE
Status: COMPLETED | OUTPATIENT
Start: 2022-10-31 | End: 2022-10-31

## 2022-10-31 RX ORDER — CEFTRIAXONE 1 G/1
1 INJECTION, POWDER, FOR SOLUTION INTRAMUSCULAR; INTRAVENOUS ONCE
Status: DISCONTINUED | OUTPATIENT
Start: 2022-10-31 | End: 2022-10-31

## 2022-10-31 RX ORDER — IOPAMIDOL 755 MG/ML
70 INJECTION, SOLUTION INTRAVASCULAR ONCE
Status: COMPLETED | OUTPATIENT
Start: 2022-10-31 | End: 2022-10-31

## 2022-10-31 RX ORDER — CEFTRIAXONE SODIUM 1 G
1 VIAL (EA) INJECTION ONCE
Status: COMPLETED | OUTPATIENT
Start: 2022-10-31 | End: 2022-10-31

## 2022-10-31 RX ADMIN — CEFTRIAXONE SODIUM 1 G: 1 INJECTION, POWDER, FOR SOLUTION INTRAMUSCULAR; INTRAVENOUS at 19:25

## 2022-10-31 RX ADMIN — IOPAMIDOL 70 ML: 755 INJECTION, SOLUTION INTRAVENOUS at 17:12

## 2022-10-31 RX ADMIN — ONDANSETRON 4 MG: 2 INJECTION INTRAMUSCULAR; INTRAVENOUS at 13:25

## 2022-10-31 RX ADMIN — DOXYCYCLINE HYCLATE 100 MG: 100 CAPSULE ORAL at 19:25

## 2022-10-31 RX ADMIN — METRONIDAZOLE 500 MG: 500 TABLET ORAL at 19:25

## 2022-10-31 RX ADMIN — ACETAMINOPHEN 975 MG: 325 TABLET, FILM COATED ORAL at 13:25

## 2022-10-31 ASSESSMENT — ENCOUNTER SYMPTOMS
NAUSEA: 1
COUGH: 0
DYSURIA: 0
FLANK PAIN: 0
DIARRHEA: 0
SHORTNESS OF BREATH: 0
PALPITATIONS: 0
ABDOMINAL PAIN: 1
SORE THROAT: 0
CHILLS: 0
FEVER: 0

## 2022-10-31 ASSESSMENT — ACTIVITIES OF DAILY LIVING (ADL)
ADLS_ACUITY_SCORE: 35

## 2022-10-31 NOTE — ED NOTES
ED Triage Provider Note  [facility]  Encounter Date: Oct 31, 2022    History:  Chief Complaint   Patient presents with     Abdominal Pain     Myriam Wylie is a 22 year old female who presents to the ED with diffuse, sharp, constant lower abdominal pain.  Patient reports that this has been ongoing and worsening over the past several days. she has a few days late for her.  She reports that she was recently treated for a chlamydial infection, completed her course of antibiotics.  Is unsure if she has had vaginal discharge.  No prior abdominal surgeries.  Does not use marijuana daily.  Denies any nausea or vomiting.  Of note, does report that she has a history of ovarian cysts.    Review of Systems:  Positive lower abdominal pain, no nausea or vomiting    Exam:  /89   Pulse 94   Temp 98.2  F (36.8  C) (Oral)   Resp 16   LMP 09/29/2022 (Approximate)   SpO2 100%   General: + acute distress. Appears stated age.   Cardio: Regular rate, extremities well perfused  Resp: Normal work of breathing, grossly normal respiratory rate  Abdomen: Tender to palpation throughout her lower abdomen.  Neuro: Alert. CN II-XII grossly intact. Grossly intact strength.       Medical Decision Making:  Patient arriving to the ED with problem as above. A medical screening exam was performed.  Labs, imaging, and medication orders initiated from Triage. The patient is appropriate to wait in triage.       TRACEY MANCILLA MD on 10/31/2022 at 1:39 PM       Tracey Mancilla MD  10/31/22 1342

## 2022-10-31 NOTE — DISCHARGE INSTRUCTIONS
TODAY'S VISIT:  - If you had any labs or imaging/radiology tests performed today, you should also discuss these tests with your usual provider.     - Please follow up about the cyst on the right ovary with GYN. It as recommended you follow up with another ultrasound in in 6-8 weeks to assess for stability/resolution.    - I placed the referral with GYN, please call them to schedule an appointment.    - Emergency Department testing is focused on the potential causes of your symptoms that are the most dangerous possibilities and cannot cover every possibility. Based on the evaluation, it was deemed sufficiently safe to discharge and continue management through the clinics. Thus, follow-up is very important to assess for improvement/worsening, potential further testing, and potential treatment adjustments.     FOLLOW-UP:  Please make an appointment to follow up with:  - Your Primary Care Provider  - And GYN referral.  - Have your provider review the results from today's visit with you again to make sure no further follow-up or additional testing is needed based on those results.     PRESCRIPTIONS / MEDICATIONS:  - Flagyl 500 mg twice daily for two weeks  - Doxycycline 100 mg twice daily for two weeks  - No sexual activity for 2 weeks;      RETURN TO THE EMERGENCY DEPARTMENT  Return to the Emergency Department at any time for any new or worsening symptoms or any concerns.

## 2022-10-31 NOTE — ED TRIAGE NOTES
Pt arrives ambulatory to triage w/ c/o lower abd pain, diarrhea, nausea, labile blood sugars, weakness. States ongoing ~1 week. Has stated somewhat similar symptoms w/ pas PID flairs. Is unsure of pregnancy stating it's unlikely, 2 days overdue for menstrual period.

## 2022-10-31 NOTE — ED PROVIDER NOTES
ED Provider Note  [facility]      History     Chief Complaint   Patient presents with     Abdominal Pain     HPI  Myriam Wylie is a 22 year old female who presents to the ED with diffuse, sharp, lower abdominal pain.  She also reports nausea and resolved diarrhea. She reports that this has been ongoing and worsening over the last week. Denies fevers, or chills, She denies any dysuria, urgency, or frequency.  Her last menstrual period was 1 month ago.  She denies any vaginal bleeding, discharge, or pelvic pain     denies any previous abdominal surgeries.     Past Medical History  Past Medical History:   Diagnosis Date     C. difficile colitis      Diabetes type 1, uncontrolled (H)      DKA (diabetic ketoacidosis) (H)      PID (acute pelvic inflammatory disease)      Past Surgical History:   Procedure Laterality Date     COLONOSCOPY N/A 9/18/2019    Procedure: COLONOSCOPY;  Surgeon: Jeremi Banks MD;  Location: [facility]      ESOPHAGOSCOPY, GASTROSCOPY, DUODENOSCOPY (EGD), COMBINED N/A 9/18/2019    Procedure: Upper endoscopy and colonoscopy with biopsy;  Surgeon: Jeremi Banks MD;  Location: [facility]      doxycycline hyclate (VIBRAMYCIN) 100 MG capsule  metroNIDAZOLE (FLAGYL) 500 MG tablet  acetone, Urine, test STRP  blood glucose monitoring (ACCU-CHEK FASTCLIX) lancets  blood glucose monitoring (ACCU-CHEK HENRI SMARTVIEW) meter device kit  blood glucose monitoring (ACCU-CHEK SMARTVIEW) test strip  Glucagon 0.5 MG/0.1ML SOSY  insulin aspart (NOVOLOG PEN) 100 UNIT/ML pen  insulin aspart (NOVOLOG PEN) 100 UNIT/ML pen  insulin glargine (BASAGLAR KWIKPEN) 100 UNIT/ML pen  insulin pen needle (BD HENRI U/F) 32G X 4 MM  Prenatal Vit-Fe Fumarate-FA (PRENATAL MULTIVITAMIN W/IRON) 27-0.8 MG tablet  pyridOXINE (VITAMIN B6) 25 MG tablet      No Known Allergies  Family History  Family History   Problem Relation Age of Onset     Diabetes No family hx of         type 1 diabetes or  autoimmunity     Social History   Social History     Tobacco Use     Smoking status: Never     Smokeless tobacco: Never   Substance Use Topics     Alcohol use: Yes     Drug use: Yes     Types: Marijuana      Past medical history, past surgical history, medications, allergies, family history, and social history were reviewed with the patient. No additional pertinent items.       Review of Systems   Constitutional: Negative for chills and fever.   HENT: Negative for sore throat.    Respiratory: Negative for cough and shortness of breath.    Cardiovascular: Negative for chest pain and palpitations.   Gastrointestinal: Positive for abdominal pain and nausea. Negative for diarrhea.   Genitourinary: Negative for dysuria, flank pain and vaginal pain.     A complete review of systems was performed with pertinent positives and negatives noted in the HPI, and all other systems negative.    Physical Exam   BP: 130/89  Pulse: 94  Temp: 98.2  F (36.8  C)  Resp: 16  SpO2: 100 %  Physical Exam  Vitals reviewed.   Constitutional:       Appearance: She is well-developed.   HENT:      Head: Normocephalic.   Eyes:      Extraocular Movements: Extraocular movements intact.      Pupils: Pupils are equal, round, and reactive to light.   Cardiovascular:      Rate and Rhythm: Normal rate and regular rhythm.      Heart sounds: Normal heart sounds.   Pulmonary:      Effort: Pulmonary effort is normal.      Breath sounds: Normal breath sounds.   Abdominal:      General: Abdomen is flat. Bowel sounds are increased.      Palpations: Abdomen is rigid.      Tenderness: There is abdominal tenderness in the right lower quadrant and left lower quadrant. There is no right CVA tenderness, left CVA tenderness, guarding or rebound. Negative signs include Pham's sign.      Comments: Very mild LRQ and LLQ tenderness to palpation.   Genitourinary:     Vagina: Vaginal discharge present.      Cervix: Normal.      Adnexa: Right adnexa normal and left adnexa  normal.        Right: No tenderness.          Left: No tenderness.     Skin:     General: Skin is warm.   Neurological:      Mental Status: She is alert.         ED Course      Procedures           Results for orders placed or performed during the hospital encounter of 10/31/22   US Pelvis Cmplt w Transvag & Doppler LmtPel Duplex Limited     Status: None   Result Value Ref Range    Radiologist flags Right ovary     Narrative    EXAMINATION: PELVIC ULTRASOUND WITH TRANSVAGINAL AND DOPPLER LIMITED,  10/31/2022 2:41 PM     COMPARISON: CT abdomen and pelvis 9/13/2022, 4/17/2021. Pelvic  ultrasound 10/11/2019.    HISTORY: Pelvic pain, prior ovarian cyst    TECHNIQUE: The pelvis was scanned in standard fashion with  transabdominal and transvaginal transducer(s) using both grey scale  and spectral flow and  color Doppler techniques.    FINDINGS:  The uterus measures 8.4 x 4.3 x 3.1 cm, and there is no evidence of a  focal fibroid.  The endometrium is within normal limits and measures 5  mm. There is no free fluid in the pelvis.  Trace endocervical and  endometrial fluid.    The right ovary measures 4.6 x 3.4 x 2.9 cm and the left ovary  measures 3.3 x 1.8 x 1.8 cm. There is no adnexal mass. There is normal  blood flow to the ovaries. Within the right ovary there is a 2.6 x 2.2  x 2.3 cm cyst with lacelike reticular echoes with no significant  internal vascularity.      Impression    IMPRESSION: Right ovarian cyst with features most suggestive of a  hemorrhagic cyst measuring up to 2.6 cm. Consider follow-up ultrasound  in 6-8 weeks to assess for stability/resolution.    [Recommend Follow Up: Right ovary]    This report will be copied to the [facility] to ensure a  provider acknowledges the finding. [facility] is available Monday  through Friday 8am-3:30 pm.    I have personally reviewed the examination and initial interpretation  and I agree with the findings.    HEMANT GIFFORD MD         SYSTEM ID:  [id number]    CT Abdomen Pelvis w Contrast     Status: None (Preliminary result)    Narrative    EXAMINATION: CT ABDOMEN PELVIS W CONTRAST, 10/31/2022 5:20 PM    TECHNIQUE:  Helical CT images from the lung bases through the  symphysis pubis were obtained with IV contrast. Contrast dose:  iopamidol (ISOVUE-370) solution 70 mL    COMPARISON: CT abdomen and pelvis 9/13/2022 and same day pelvic  ultrasound    HISTORY: Lower abd pain, no cervical wall motion tenderness.    FINDINGS:    Abdomen and pelvis:   No focal hepatic lesion. Gallbladder stones/sludge. No intra or  extrahepatic biliary ductal dilation. The spleen, adrenal glands and  pancreas are unremarkable. Symmetric nephrographic enhancement of the  kidneys. No hydronephrosis or hydroureter. Urinary bladder is  unremarkable.    3 cm right adnexal cyst. Small amount of physiologic free fluid in the  pelvis. The small and large bowel is normal in caliber. Appendix is  normal. No intra-abdominal free air. The major intra-abdominal  vasculature appears patent and normal in caliber. No pathologically  enlarged abdominal or pelvic lymph nodes.    Lung bases: Clear. No pleural effusion. Heart size is normal. No  pericardial effusion.    Bones and soft tissues: No acute or aggressive appearing osseous  lesion.      Impression    IMPRESSION:   1. Complex right adnexal cyst, better evaluated on same-day  ultrasound, may represent hemorrhagic cyst versus inflammatory  etiology or endometrioma. Consider ultrasound  follow up   2. Mild non specific pelvic mesenteric fat streakiness, may represent  congestive versus inflammatory etiology.   Comprehensive metabolic panel     Status: Abnormal   Result Value Ref Range    Sodium 136 136 - 145 mmol/L    Potassium 4.4 3.4 - 5.3 mmol/L    Chloride 103 98 - 107 mmol/L    Carbon Dioxide (CO2) 19 (L) 22 - 29 mmol/L    Anion Gap 14 7 - 15 mmol/L    Urea Nitrogen 7.4 6.0 - 20.0 mg/dL    Creatinine 0.65 0.51 - 0.95 mg/dL    Calcium 8.9 8.6 - 10.0  mg/dL    Glucose 123 (H) 70 - 99 mg/dL    Alkaline Phosphatase 167 (H) 35 - 104 U/L    AST 18 10 - 35 U/L    ALT <5 (L) 10 - 35 U/L    Protein Total 7.5 6.4 - 8.3 g/dL    Albumin 3.9 3.5 - 5.2 g/dL    Bilirubin Total 0.5 <=1.2 mg/dL    GFR Estimate >90 >60 mL/min/1.73m2   Lipase     Status: Abnormal   Result Value Ref Range    Lipase 11 (L) 13 - 60 U/L   HCG qualitative Blood     Status: Normal   Result Value Ref Range    hCG Serum Qualitative Negative Negative   UA with Microscopic reflex to Culture     Status: Abnormal    Specimen: Urine, Clean Catch   Result Value Ref Range    Color Urine Light Yellow Colorless, Straw, Light Yellow, Yellow    Appearance Urine Clear Clear    Glucose Urine Negative Negative mg/dL    Bilirubin Urine Negative Negative    Ketones Urine 40 (A) Negative mg/dL    Specific Gravity Urine 1.015 1.003 - 1.035    Blood Urine Negative Negative    pH Urine 6.0 5.0 - 7.0    Protein Albumin Urine Negative Negative mg/dL    Urobilinogen Urine Normal Normal, 2.0 mg/dL    Nitrite Urine Negative Negative    Leukocyte Esterase Urine Negative Negative    RBC Urine <1 <=2 /HPF    WBC Urine 1 <=5 /HPF    Squamous Epithelials Urine 5 (H) <=1 /HPF    Transitional Epithelials Urine <1 <=1 /HPF    Narrative    Urine Culture not indicated   CBC with platelets and differential     Status: Abnormal   Result Value Ref Range    WBC Count 11.1 (H) 4.0 - 11.0 10e3/uL    RBC Count 4.48 3.80 - 5.20 10e6/uL    Hemoglobin 11.8 11.7 - 15.7 g/dL    Hematocrit 37.7 35.0 - 47.0 %    MCV 84 78 - 100 fL    MCH 26.3 (L) 26.5 - 33.0 pg    MCHC 31.3 (L) 31.5 - 36.5 g/dL    RDW 15.8 (H) 10.0 - 15.0 %    Platelet Count 215 150 - 450 10e3/uL    % Neutrophils 77 %    % Lymphocytes 18 %    % Monocytes 5 %    % Eosinophils 0 %    % Basophils 0 %    % Immature Granulocytes 0 %    NRBCs per 100 WBC 0 <1 /100    Absolute Neutrophils 8.4 (H) 1.6 - 8.3 10e3/uL    Absolute Lymphocytes 2.0 0.8 - 5.3 10e3/uL    Absolute Monocytes 0.6 0.0 -  1.3 10e3/uL    Absolute Eosinophils 0.0 0.0 - 0.7 10e3/uL    Absolute Basophils 0.0 0.0 - 0.2 10e3/uL    Absolute Immature Granulocytes 0.0 <=0.4 10e3/uL    Absolute NRBCs 0.0 10e3/uL   Glucose by meter     Status: Abnormal   Result Value Ref Range    GLUCOSE BY METER POCT 123 (H) 70 - 99 mg/dL   Creatinine POCT     Status: Normal   Result Value Ref Range    Creatinine POCT 0.5 0.5 - 1.0 mg/dL    GFR, ESTIMATED POCT >60 >60 mL/min/1.73m2   Glucose by meter     Status: Abnormal   Result Value Ref Range    GLUCOSE BY METER POCT 399 (H) 70 - 99 mg/dL   Wet prep     Status: Abnormal    Specimen: Vagina; Swab   Result Value Ref Range    Trichomonas Absent Absent    Yeast Absent Absent    Clue Cells Absent Absent    WBCs/high power field 2+ (A) None   CBC with platelets differential     Status: Abnormal    Narrative    The following orders were created for panel order CBC with platelets differential.  Procedure                               Abnormality         Status                     ---------                               -----------         ------                     CBC with platelets and d...[[id number]]  Abnormal            Final result                 Please view results for these tests on the individual orders.     Medications   acetaminophen (TYLENOL) tablet 975 mg (975 mg Oral Given 10/31/22 1325)   ondansetron (ZOFRAN) injection 4 mg (4 mg Intravenous Given 10/31/22 1325)   iopamidol (ISOVUE-370) solution 70 mL (70 mLs Intravenous Given 10/31/22 1712)   sodium chloride (PF) 0.9% PF flush 69 mL (69 mLs Intravenous Given 10/31/22 1712)   doxycycline hyclate (VIBRAMYCIN) capsule 100 mg (100 mg Oral Given 10/31/22 1925)   metroNIDAZOLE (FLAGYL) tablet 500 mg (500 mg Oral Given 10/31/22 1925)   cefTRIAXone (ROCEPHIN) injection 1 g (1 g Intramuscular Given 10/31/22 1925)        Assessments & Plan (with Medical Decision Making)   22 year old female w/ PMH notable for PID and recent chlamydia infection.      Clinically, patient appears in no acute distress . Vital signs stabl. Otherwise on examination, patient with very mild lower abdominal tenderness on abdominal exam. Her pelvic exam was negative for cervical wall motion tenderness.    Laboratory evaluation showed no electrolyte abnormality.  Mild leukocytosis.   Abdominal CT also obtained to evaluate for intra-abdominal pathology.  There are some inflammatory change in the pelvis that may represent PID.  Her pelvic examination is not overly convincing for PID but with CT findings, will treat with metronidazole and doxycycline.  Pelvic and urinary cultures are pending.  The patient does not have any significant abdominal pain.  Her pelvic US showed right ovarian cyst, and her previous CT scan does raise question of endometriosis.      With her recurrent pain and previous CT showing possible endometriosis, I feel that gynecology follow-up is warranted. Did place a referral for this.    She was given IV Ceftriaxone and her first doses of Flagyl and Doxycycline in the department.    Return precautions provided.  Patient appears clinically well and appropriate for outpatient management.    I have reviewed the nursing notes. I have reviewed the findings, diagnosis, plan and need for follow up with the patient.    Discharge Medication List as of 10/31/2022  7:21 PM      START taking these medications    Details   doxycycline hyclate (VIBRAMYCIN) 100 MG capsule Take 1 capsule (100 mg) by mouth 2 times daily for 14 days, Disp-28 capsule, R-0, Local Print      metroNIDAZOLE (FLAGYL) 500 MG tablet Take 1 tablet (500 mg) by mouth 2 times daily for 14 days, Disp-28 tablet, R-0, Local PrintEat yogurt or cottage cheese daily to prevent diarrhea that can be caused by taking this medication.             Final diagnoses:   PID (acute pelvic inflammatory disease)   Cyst of right ovary       --  RASHID Smith [facility] EMERGENCY DEPARTMENT  10/31/2022      Chepe Mitchell, APRN CNP  10/31/22 2054

## 2022-10-31 NOTE — Clinical Note
Myriam Wylie was seen and treated in our emergency department on 10/31/2022.  She may return to work on 11/02/2022.       If you have any questions or concerns, please don't hesitate to call.      Chepe Mitchell APRN CNP

## 2022-10-31 NOTE — ED TRIAGE NOTES
Triage Assessment     Row Name 10/31/22 1300       Triage Assessment (Adult)    Airway WDL WDL       Respiratory WDL    Respiratory WDL WDL       Skin Circulation/Temperature WDL    Skin Circulation/Temperature WDL WDL       Cardiac WDL    Cardiac WDL WDL       Peripheral/Neurovascular WDL    Peripheral Neurovascular WDL WDL       Cognitive/Neuro/Behavioral WDL    Cognitive/Neuro/Behavioral WDL WDL

## 2022-11-01 ENCOUNTER — TELEPHONE (OUTPATIENT)
Dept: EMERGENCY MEDICINE | Facility: CLINIC | Age: 22
End: 2022-11-01

## 2022-11-01 LAB
C TRACH DNA SPEC QL PROBE+SIG AMP: NEGATIVE
N GONORRHOEA DNA SPEC QL NAA+PROBE: POSITIVE

## 2022-11-01 NOTE — TELEPHONE ENCOUNTER
[facility] Emergency Department Lab result notification    [facility] ED lab result protocol used  Gonorrheae    Reason for call  Notify of lab results, assess symptoms,  review ED providers recommendations/discharge instructions (if necessary) and advise per ED lab result f/u protocol    Lab Result (including Rx patient on, if applicable)  Final N. Gonorrhoeae PCR is [POSITIVE] AND Chlamydia T PCR is [NEGATIVE]  Patient was treated for N. Gonorrhea AND/OR Chlamydia T in the [facility] Emergency Dept  [Yes or No]:  YES       If Yes, list what was given in the ED:  cefTRIAXone (ROCEPHIN) injection 1 g  [facility] Emergency Dept discharge antibiotic (if prescribed): Doxycycline 100 mg PO tablet, 1 tablet (100 mg) by mouth 2 times daily for 14 days & Flagyl  Recommendations in treatment per [facility] ED lab result Chlamydia T. AND/OR N. Gonorrhea protocol    Information table from Emergency Dept Provider visit on 10/31/22  Symptoms reported at ED visit (Chief complaint, HPI) Chief Complaint   Patient presents with     Abdominal Pain      HPI  Myriam Wylie is a 22 year old female who presents to the ED with diffuse, sharp, lower abdominal pain.  She also reports nausea and resolved diarrhea. She reports that this has been ongoing and worsening over the last week. Denies fevers, or chills, She denies any dysuria, urgency, or frequency.  Her last menstrual period was 1 month ago.  She denies any vaginal bleeding, discharge, or pelvic pain      denies any previous abdominal surgeries.      Significant Medical hx, if applicable (i.e. CKD, diabetes) DMII, C.diff   Allergies No Known Allergies   Weight, if applicable Wt Readings from Last 2 Encounters:   09/19/22 56.7 kg (125 lb)   09/13/22 56.7 kg (125 lb)      Coumadin/Warfarin [Yes /No] NO   Creatinine Level (mg/dl) Creatinine   Date Value Ref Range Status   10/31/2022 0.65 0.51 - 0.95 mg/dL Final   04/18/2021 0.75 0.50 - 1.00 mg/dL Final      Creatinine POCT   Date Value Ref Range Status   10/31/2022 0.5 0.5 - 1.0 mg/dL Final      Creatinine clearance (ml/min), if applicable Serum creatinine: 0.5 mg/dL 10/31/22 1631  Estimated creatinine clearance: 160.7 mL/min   Pregnant (Yes/No/NA) Negative HCG on 11/1   Breastfeeding (Yes/No/NA) Unknown   ED providers Impression and Plan (applicable information) 22 year old female w/ PMH notable for PID and recent chlamydia infection.      Clinically, patient appears in no acute distress . Vital signs stabl. Otherwise on examination, patient with very mild lower abdominal tenderness on abdominal exam. Her pelvic exam was negative for cervical wall motion tenderness.     Laboratory evaluation showed no electrolyte abnormality.  Mild leukocytosis.   Abdominal CT also obtained to evaluate for intra-abdominal pathology.  There are some inflammatory change in the pelvis that may represent PID.  Her pelvic examination is not overly convincing for PID but with CT findings, will treat with metronidazole and doxycycline.  Pelvic and urinary cultures are pending.  The patient does not have any significant abdominal pain.  Her pelvic US showed right ovarian cyst, and her previous CT scan does raise question of endometriosis.       With her recurrent pain and previous CT showing possible endometriosis, I feel that gynecology follow-up is warranted. Did place a referral for this.     She was given IV Ceftriaxone and her first doses of Flagyl and Doxycycline in the department.     Return precautions provided.  Patient appears clinically well and appropriate for outpatient management.     I have reviewed the nursing notes. I have reviewed the findings, diagnosis, plan and need for follow up with the patient.         Discharge Medication List as of 10/31/2022  7:21 PM           START taking these medications     Details   doxycycline hyclate (VIBRAMYCIN) 100 MG capsule Take 1 capsule (100 mg) by mouth 2 times daily for 14 days,  Disp-28 capsule, R-0, Local Print       metroNIDAZOLE (FLAGYL) 500 MG tablet Take 1 tablet (500 mg) by mouth 2 times daily for 14 days, Disp-28 tablet, R-0, Local PrintEat yogurt or cottage cheese daily to prevent diarrhea that can be caused by taking this medication.                 Final diagnoses:   PID (acute pelvic inflammatory disease)   Cyst of right ovary         --  RASHID Smith CNP  [facility] EMERGENCY DEPARTMENT  10/31/2022     Chepe Mitchell APRN      ED diagnosis  PID (acute pelvic inflammatory disease)  Cyst of right ovary   ED provider  Silvio Mancilla MD        RN Assessment (Patient s current Symptoms), include time called.  11:37 AM - call attempt - no answer - voicemail not set up    Guerita Silver RN  [facility] [email or web address]  Emergency Dept Lab Result RN  Ph# [phone]     Copy of Lab result   Component      Latest Ref Rng & Units 10/31/2022   Trichomonas      Absent Absent   Yeast      Absent Absent   Clue cells      Absent Absent   WBCs/high power field      None 2+ (A)   Chlamydia Trachomatis PCR      Negative Negative   Neisseria gonorrhoeae      Negative Positive (A)         Passed

## 2022-11-02 NOTE — TELEPHONE ENCOUNTER
Patient has viewed result via [employer].  Sent information about Gonorrhea via [employer] including these STD Patient Instructions:    We recommend that you contact any recent sexual partners within the last 2 months and have them evaluated by a physician.    Avoid sexual activity for 7 to 10 days or until both you and your partner(s) have completed all antibiotic medications.    We advise that you consider following up with your PCP at approximately 3 months for retesting to be sure the infection has cleared.    Олег Kaminski RN  [email or web address] [facility]  Emergency Dept Lab Result RN  Ph# [phone]

## 2022-11-05 ENCOUNTER — TELEPHONE (OUTPATIENT)
Dept: NURSING | Facility: CLINIC | Age: 22
End: 2022-11-05

## 2022-11-05 ENCOUNTER — NURSE TRIAGE (OUTPATIENT)
Dept: NURSING | Facility: CLINIC | Age: 22
End: 2022-11-05

## 2022-11-05 NOTE — TELEPHONE ENCOUNTER
"Nurse Triage SBAR    Is this a 2nd Level Triage? NO    Situation: Patient calling with nausea.  Consent: not needed    Background:     Assessment:   Pt states she has thrown up each day since discharge from ED.  States the most she has vomited was 2x in one day.   * States she is 8 days late on her period but notes the ED did a preg test and it was negative.   * Pt notes that she just feels nauseas and states \"anything I eat feels really heavy in my stomach\".  States \"my appetite is not there\".  Pt states she is able to eat and keep it down but states \"its just really hard on my stomach\".   * Vomiting:  Today - 1x.    * Pt states she has had nausea like this before.  States she was diagnosed with PID last month as well but with a different infection and they sent her home  * States blood glucose was 67 but states \"it feels like my body is going into DKA so I felt that's why I should call\".      Protocol Recommended Disposition:   Call PCP wthin 24 hours.  Pt has no PCP with [facility] so advised [place] [facility] [place] reviewed locations and wait times with this patient.     Recommendation: Advised patient to Go to urgent care . Reviewed concerning symptoms and when to call back.   Pt states [facility] where she would go as it's the closest.      TIP  Providers, please consider if this condition is appropriate for management at one of our Acute and Diagnostic Services sites.     If patient is a good candidate, please use dotphrase <dot>triageresponse and select Refer to ADS to document.         Shruthi Li RN [facility] 11/5/2022 1:32 PM                              Reason for Disposition    Taking prescription medication that could cause nausea (e.g., narcotics/opiates, antibiotics, OCPs, many others)    Additional Information    Negative: Shock suspected (e.g., cold/pale/clammy skin, too weak to stand, low BP, rapid pulse)    Negative: Sounds like a life-threatening emergency to the " triager    Negative: [1] Nausea or vomiting AND [2] pregnancy < 20 weeks    Negative: Menstrual Period - Missed or Late (i.e., pregnancy suspected)    Negative: Heat exhaustion suspected (i.e., dehydration from heat exposure)    Negative: Motion sickness suspected (i.e., nausea with car, plane, boat, or train travel)    Negative: Anxiety or stress suspected (i.e., nausea with anxiety attacks or stressful situations)    Negative: Traumatic Brain Injury (TBI) suspected    Negative: Nausea (or Vomiting) in a cancer patient who is currently (or recently) receiving chemotherapy or radiation therapy, or cancer patient who has metastatic or end-stage cancer and is receiving palliative care    Negative: Vomiting occurs    Negative: Other symptom is present, see that guideline.  (e.g., chest pain, headache, dizziness, abdominal pain, colds, sore throat, etc.).    Negative: Unable to walk, or can only walk with assistance (e.g., requires support)    Negative: Difficulty breathing    Negative: [1] Insulin-dependent diabetes (Type I) AND [2] glucose > 400 mg/dl (22 mmol/l)    Negative: [1] Drinking very little AND [2] dehydration suspected (e.g., no urine > 12 hours, very dry mouth, very lightheaded)    Negative: Patient sounds very sick or weak to the triager    Negative: Fever > 104 F (40 C)    Negative: [1] Fever > 101 F (38.3 C) AND [2] age > 60 years    Negative: [1] Fever > 100.0 F (37.8 C) AND [2] bedridden (e.g., nursing home patient, CVA, chronic illness, recovering from surgery)    Negative: [1] Fever > 100.0 F (37.8 C) AND [2] diabetes mellitus or weak immune system (e.g., HIV positive, cancer chemo, splenectomy, organ transplant, chronic steroids)    Negative: Taking any of the following medications: digoxin (Lanoxin), lithium, theophylline, phenytoin (Dilantin)    Negative: Yellowish color of the skin or white of the eye (i.e., jaundice)    Negative: Fever present > 3 days (72 hours)    Negative: Receiving cancer  chemotherapy medication    Protocols used: NAUSEA-A-AH

## 2022-11-05 NOTE — TELEPHONE ENCOUNTER
Patient was seen ER OCt 31st.    Patient has been taking antibiotics and her abdominal pain is getting better.    She has had nausea    -patient's phone hung up    Patient's phone is unable to take messages on RN's attempt to call patient back.  Mariana Pedro RN on 11/5/2022 at 1:12 PM

## 2023-01-09 ENCOUNTER — HOSPITAL ENCOUNTER (EMERGENCY)
Facility: CLINIC | Age: 23
Discharge: HOME | End: 2023-01-10
Attending: EMERGENCY MEDICINE | Admitting: EMERGENCY MEDICINE
Payer: COMMERCIAL

## 2023-01-09 VITALS
SYSTOLIC BLOOD PRESSURE: 143 MMHG | BODY MASS INDEX: 23.59 KG/M2 | DIASTOLIC BLOOD PRESSURE: 94 MMHG | RESPIRATION RATE: 16 BRPM | OXYGEN SATURATION: 100 % | HEART RATE: 88 BPM | WEIGHT: 117 LBS | HEIGHT: 59 IN | TEMPERATURE: 98.1 F

## 2023-01-09 DIAGNOSIS — R11.2 NAUSEA AND VOMITING, UNSPECIFIED VOMITING TYPE: ICD-10-CM

## 2023-01-09 DIAGNOSIS — E10.65 TYPE 1 DIABETES MELLITUS WITH HYPERGLYCEMIA (H): ICD-10-CM

## 2023-01-09 LAB
ALBUMIN SERPL BCG-MCNC: 3.9 G/DL (ref 3.5–5.2)
ALBUMIN UR-MCNC: 70 MG/DL
ALP SERPL-CCNC: 161 U/L (ref 35–104)
ALT SERPL W P-5'-P-CCNC: 18 U/L (ref 10–35)
ANION GAP SERPL CALCULATED.3IONS-SCNC: 16 MMOL/L (ref 7–15)
APPEARANCE UR: CLEAR
AST SERPL W P-5'-P-CCNC: 19 U/L (ref 10–35)
ATRIAL RATE - MUSE: 94 BPM
B-OH-BUTYR SERPL-MCNC: <0.18 MMOL/L
BASE EXCESS BLDV CALC-SCNC: 1 MMOL/L (ref -7.7–1.9)
BASOPHILS # BLD AUTO: 0 10E3/UL (ref 0–0.2)
BASOPHILS NFR BLD AUTO: 1 %
BILIRUB SERPL-MCNC: 0.2 MG/DL
BILIRUB UR QL STRIP: NEGATIVE
BUN SERPL-MCNC: 9.9 MG/DL (ref 6–20)
CALCIUM SERPL-MCNC: 9.3 MG/DL (ref 8.6–10)
CHLORIDE SERPL-SCNC: 101 MMOL/L (ref 98–107)
COLOR UR AUTO: ABNORMAL
CREAT SERPL-MCNC: 0.6 MG/DL (ref 0.51–0.95)
DEPRECATED HCO3 PLAS-SCNC: 21 MMOL/L (ref 22–29)
DIASTOLIC BLOOD PRESSURE - MUSE: NORMAL MMHG
EOSINOPHIL # BLD AUTO: 0 10E3/UL (ref 0–0.7)
EOSINOPHIL NFR BLD AUTO: 1 %
ERYTHROCYTE [DISTWIDTH] IN BLOOD BY AUTOMATED COUNT: 15.9 % (ref 10–15)
GFR SERPL CREATININE-BSD FRML MDRD: >90 ML/MIN/1.73M2
GLUCOSE BLDC GLUCOMTR-MCNC: 132 MG/DL (ref 70–99)
GLUCOSE BLDC GLUCOMTR-MCNC: 79 MG/DL (ref 70–99)
GLUCOSE SERPL-MCNC: 137 MG/DL (ref 70–99)
GLUCOSE UR STRIP-MCNC: 500 MG/DL
HCG SERPL QL: NEGATIVE
HCO3 BLDV-SCNC: 27 MMOL/L (ref 21–28)
HCT VFR BLD AUTO: 35.4 % (ref 35–47)
HGB BLD-MCNC: 11.1 G/DL (ref 11.7–15.7)
HGB UR QL STRIP: NEGATIVE
IMM GRANULOCYTES # BLD: 0 10E3/UL
IMM GRANULOCYTES NFR BLD: 0 %
INTERPRETATION ECG - MUSE: NORMAL
KETONES UR STRIP-MCNC: ABNORMAL MG/DL
LACTATE SERPL-SCNC: 1.9 MMOL/L (ref 0.7–2)
LACTATE SERPL-SCNC: 4 MMOL/L (ref 0.7–2)
LEUKOCYTE ESTERASE UR QL STRIP: NEGATIVE
LIPASE SERPL-CCNC: 15 U/L (ref 13–60)
LYMPHOCYTES # BLD AUTO: 3.6 10E3/UL (ref 0.8–5.3)
LYMPHOCYTES NFR BLD AUTO: 40 %
MCH RBC QN AUTO: 26.6 PG (ref 26.5–33)
MCHC RBC AUTO-ENTMCNC: 31.4 G/DL (ref 31.5–36.5)
MCV RBC AUTO: 85 FL (ref 78–100)
MONOCYTES # BLD AUTO: 0.5 10E3/UL (ref 0–1.3)
MONOCYTES NFR BLD AUTO: 5 %
MUCOUS THREADS #/AREA URNS LPF: PRESENT /LPF
NEUTROPHILS # BLD AUTO: 4.7 10E3/UL (ref 1.6–8.3)
NEUTROPHILS NFR BLD AUTO: 53 %
NITRATE UR QL: NEGATIVE
NRBC # BLD AUTO: 0 10E3/UL
NRBC BLD AUTO-RTO: 0 /100
O2/TOTAL GAS SETTING VFR VENT: 21 %
P AXIS - MUSE: 44 DEGREES
PCO2 BLDV: 47 MM HG (ref 40–50)
PH BLDV: 7.37 [PH] (ref 7.32–7.43)
PH UR STRIP: 7.5 [PH] (ref 5–7)
PLATELET # BLD AUTO: 241 10E3/UL (ref 150–450)
PO2 BLDV: 27 MM HG (ref 25–47)
POTASSIUM SERPL-SCNC: 3.6 MMOL/L (ref 3.4–5.3)
PR INTERVAL - MUSE: 134 MS
PROT SERPL-MCNC: 7.3 G/DL (ref 6.4–8.3)
QRS DURATION - MUSE: 70 MS
QT - MUSE: 374 MS
QTC - MUSE: 467 MS
R AXIS - MUSE: 52 DEGREES
RBC # BLD AUTO: 4.18 10E6/UL (ref 3.8–5.2)
RBC URINE: 0 /HPF
SODIUM SERPL-SCNC: 138 MMOL/L (ref 136–145)
SP GR UR STRIP: 1.02 (ref 1–1.03)
SQUAMOUS EPITHELIAL: 3 /HPF
SYSTOLIC BLOOD PRESSURE - MUSE: NORMAL MMHG
T AXIS - MUSE: 36 DEGREES
TRANSITIONAL EPI: <1 /HPF
UROBILINOGEN UR STRIP-MCNC: NORMAL MG/DL
VENTRICULAR RATE- MUSE: 94 BPM
WBC # BLD AUTO: 8.8 10E3/UL (ref 4–11)
WBC URINE: 0 /HPF

## 2023-01-09 PROCEDURE — 83690 ASSAY OF LIPASE: CPT | Performed by: EMERGENCY MEDICINE

## 2023-01-09 PROCEDURE — 85025 COMPLETE CBC W/AUTO DIFF WBC: CPT | Performed by: EMERGENCY MEDICINE

## 2023-01-09 PROCEDURE — 84155 ASSAY OF PROTEIN SERUM: CPT | Performed by: EMERGENCY MEDICINE

## 2023-01-09 PROCEDURE — 96375 TX/PRO/DX INJ NEW DRUG ADDON: CPT | Performed by: EMERGENCY MEDICINE

## 2023-01-09 PROCEDURE — 250N000009 HC RX 250: Performed by: EMERGENCY MEDICINE

## 2023-01-09 PROCEDURE — 93010 ELECTROCARDIOGRAM REPORT: CPT | Performed by: EMERGENCY MEDICINE

## 2023-01-09 PROCEDURE — 82010 KETONE BODYS QUAN: CPT | Performed by: EMERGENCY MEDICINE

## 2023-01-09 PROCEDURE — 99285 EMERGENCY DEPT VISIT HI MDM: CPT | Mod: 25 | Performed by: EMERGENCY MEDICINE

## 2023-01-09 PROCEDURE — 96361 HYDRATE IV INFUSION ADD-ON: CPT | Performed by: EMERGENCY MEDICINE

## 2023-01-09 PROCEDURE — 250N000013 HC RX MED GY IP 250 OP 250 PS 637: Performed by: EMERGENCY MEDICINE

## 2023-01-09 PROCEDURE — 82803 BLOOD GASES ANY COMBINATION: CPT | Performed by: EMERGENCY MEDICINE

## 2023-01-09 PROCEDURE — 250N000011 HC RX IP 250 OP 636: Performed by: NURSE PRACTITIONER

## 2023-01-09 PROCEDURE — 83605 ASSAY OF LACTIC ACID: CPT | Performed by: NURSE PRACTITIONER

## 2023-01-09 PROCEDURE — 258N000003 HC RX IP 258 OP 636: Performed by: EMERGENCY MEDICINE

## 2023-01-09 PROCEDURE — 36415 COLL VENOUS BLD VENIPUNCTURE: CPT | Performed by: NURSE PRACTITIONER

## 2023-01-09 PROCEDURE — 99284 EMERGENCY DEPT VISIT MOD MDM: CPT | Mod: 25 | Performed by: EMERGENCY MEDICINE

## 2023-01-09 PROCEDURE — 84703 CHORIONIC GONADOTROPIN ASSAY: CPT | Performed by: EMERGENCY MEDICINE

## 2023-01-09 PROCEDURE — 250N000011 HC RX IP 250 OP 636: Performed by: EMERGENCY MEDICINE

## 2023-01-09 PROCEDURE — 96376 TX/PRO/DX INJ SAME DRUG ADON: CPT | Performed by: EMERGENCY MEDICINE

## 2023-01-09 PROCEDURE — 36415 COLL VENOUS BLD VENIPUNCTURE: CPT | Performed by: EMERGENCY MEDICINE

## 2023-01-09 PROCEDURE — 81001 URINALYSIS AUTO W/SCOPE: CPT | Performed by: EMERGENCY MEDICINE

## 2023-01-09 PROCEDURE — 93005 ELECTROCARDIOGRAM TRACING: CPT | Performed by: EMERGENCY MEDICINE

## 2023-01-09 PROCEDURE — 96374 THER/PROPH/DIAG INJ IV PUSH: CPT | Performed by: EMERGENCY MEDICINE

## 2023-01-09 RX ORDER — HYDROMORPHONE HYDROCHLORIDE 1 MG/ML
0.5 INJECTION, SOLUTION INTRAMUSCULAR; INTRAVENOUS; SUBCUTANEOUS
Status: COMPLETED | OUTPATIENT
Start: 2023-01-09 | End: 2023-01-09

## 2023-01-09 RX ORDER — ONDANSETRON 4 MG/1
4 TABLET, ORALLY DISINTEGRATING ORAL EVERY 8 HOURS PRN
Qty: 10 TABLET | Refills: 0 | Status: SHIPPED | OUTPATIENT
Start: 2023-01-09 | End: 2023-01-12

## 2023-01-09 RX ORDER — ONDANSETRON 2 MG/ML
4 INJECTION INTRAMUSCULAR; INTRAVENOUS ONCE
Status: COMPLETED | OUTPATIENT
Start: 2023-01-09 | End: 2023-01-09

## 2023-01-09 RX ADMIN — LIDOCAINE HYDROCHLORIDE 30 ML: 20 SOLUTION OROPHARYNGEAL at 23:44

## 2023-01-09 RX ADMIN — HYDROMORPHONE HYDROCHLORIDE 0.5 MG: 1 INJECTION, SOLUTION INTRAMUSCULAR; INTRAVENOUS; SUBCUTANEOUS at 19:34

## 2023-01-09 RX ADMIN — SODIUM CHLORIDE 1000 ML: 9 INJECTION, SOLUTION INTRAVENOUS at 21:18

## 2023-01-09 RX ADMIN — SODIUM CHLORIDE 1000 ML: 9 INJECTION, SOLUTION INTRAVENOUS at 19:03

## 2023-01-09 RX ADMIN — ONDANSETRON 4 MG: 2 INJECTION INTRAMUSCULAR; INTRAVENOUS at 23:41

## 2023-01-09 RX ADMIN — ONDANSETRON 4 MG: 2 INJECTION INTRAMUSCULAR; INTRAVENOUS at 19:34

## 2023-01-09 ASSESSMENT — ACTIVITIES OF DAILY LIVING (ADL)
ADLS_ACUITY_SCORE: 35
ADLS_ACUITY_SCORE: 33
ADLS_ACUITY_SCORE: 35

## 2023-01-09 NOTE — ED PROVIDER NOTES
"ED Provider Note  [facility]      History     Chief Complaint   Patient presents with     Vomiting     HPI  Myriam Wylie is a 22 year old female with a past medical history significant for type 1 diabetes who presents with 5 days of nausea and vomiting. Using zofran with some improvement for the first couple of days. Worse vomiting the last 2 days not improving with zofran. No hematemesis. She is concerned she may be in DKA.  Test her urine for ketones today, read as \"high\". Blood sugars up to 300s. Needed extra insulin today. Insulin today: 16U long at noon,  + 6 units short acting. Patient does not believe she is pregnant.  She denies any chest pain, shortness of breath, reports only mild abdominal pain.     Past Medical History  Past Medical History:   Diagnosis Date     C. difficile colitis      Diabetes type 1, uncontrolled (H)      DKA (diabetic ketoacidosis) (H)      PID (acute pelvic inflammatory disease)      Past Surgical History:   Procedure Laterality Date     COLONOSCOPY N/A 9/18/2019    Procedure: COLONOSCOPY;  Surgeon: Jeremi Banks MD;  Location: [facility]      ESOPHAGOSCOPY, GASTROSCOPY, DUODENOSCOPY (EGD), COMBINED N/A 9/18/2019    Procedure: Upper endoscopy and colonoscopy with biopsy;  Surgeon: Jeremi Banks MD;  Location: [facility]      omeprazole (PRILOSEC) 20 MG DR capsule  ondansetron (ZOFRAN ODT) 4 MG ODT tab  acetone, Urine, test STRP  blood glucose monitoring (ACCU-CHEK FASTCLIX) lancets  blood glucose monitoring (ACCU-CHEK HENRI SMARTVIEW) meter device kit  blood glucose monitoring (ACCU-CHEK SMARTVIEW) test strip  Glucagon 0.5 MG/0.1ML SOSY  insulin aspart (NOVOLOG PEN) 100 UNIT/ML pen  insulin aspart (NOVOLOG PEN) 100 UNIT/ML pen  insulin glargine (BASAGLAR KWIKPEN) 100 UNIT/ML pen  insulin pen needle (BD HENRI U/F) 32G X 4 MM  Prenatal Vit-Fe Fumarate-FA (PRENATAL MULTIVITAMIN W/IRON) 27-0.8 MG tablet  pyridOXINE (VITAMIN B6) 25 MG " "tablet      No Known Allergies  Family History  Family History   Problem Relation Age of Onset     Diabetes No family hx of         type 1 diabetes or autoimmunity     Social History   Social History     Tobacco Use     Smoking status: Never     Smokeless tobacco: Never   Substance Use Topics     Alcohol use: Yes     Drug use: Yes     Types: Marijuana         A medically appropriate review of systems was performed with pertinent positives and negatives noted in the HPI, and all other systems negative.    Physical Exam   BP: (!) 139/96  Pulse: (!) 122  Temp: 98.1  F (36.7  C)  Resp: 16  Height: 149.9 cm (4' 11\")  Weight: 53.1 kg (117 lb)  SpO2: 98 %  Physical Exam  Vitals and nursing note reviewed.   Constitutional:       General: She is not in acute distress.     Appearance: Normal appearance. She is ill-appearing. She is not toxic-appearing or diaphoretic.   HENT:      Head: Normocephalic and atraumatic.   Cardiovascular:      Rate and Rhythm: Normal rate and regular rhythm.      Pulses: Normal pulses.      Heart sounds: Normal heart sounds.   Pulmonary:      Effort: Pulmonary effort is normal.      Breath sounds: Normal breath sounds.   Abdominal:      General: Abdomen is flat. There is no distension.      Palpations: Abdomen is soft.      Tenderness: There is no guarding.      Comments: Mild generalized abdominal tenderness   Musculoskeletal:         General: Normal range of motion.   Skin:     General: Skin is warm and dry.      Capillary Refill: Capillary refill takes less than 2 seconds.   Neurological:      General: No focal deficit present.      Mental Status: She is alert and oriented to person, place, and time.   Psychiatric:         Mood and Affect: Mood normal.         Behavior: Behavior normal.         ED Course, Procedures, & Data     ED Course as of 01/11/23 1345   Mon Jan 09, 2023   1853 Lactic Acid(!!): 4.0     Procedures       ED Course Selections:        EKG Interpretation:      Interpreted by " RASHID Ryan CNP  Time reviewed: 1927  Symptoms at time of EKG: nausea, abdominal pain   Rhythm: normal sinus, possible left atrial enlargement  Rate: normal  Axis: normal  Ectopy: none  Conduction: normal  ST Segments/ T Waves: No ST-T wave changes  Q Waves: none  Comparison to prior: Unchanged from 07/22/22    Clinical Impression: no acute changes     Results for orders placed or performed during the hospital encounter of 01/09/23   UA with Microscopic reflex to Culture     Status: Abnormal    Specimen: Urine, Clean Catch   Result Value Ref Range    Color Urine Light Yellow Colorless, Straw, Light Yellow, Yellow    Appearance Urine Clear Clear    Glucose Urine 500 (A) Negative mg/dL    Bilirubin Urine Negative Negative    Ketones Urine Trace (A) Negative mg/dL    Specific Gravity Urine 1.022 1.003 - 1.035    Blood Urine Negative Negative    pH Urine 7.5 (H) 5.0 - 7.0    Protein Albumin Urine 70 (A) Negative mg/dL    Urobilinogen Urine Normal Normal, 2.0 mg/dL    Nitrite Urine Negative Negative    Leukocyte Esterase Urine Negative Negative    Mucus Urine Present (A) None Seen /LPF    RBC Urine 0 <=2 /HPF    WBC Urine 0 <=5 /HPF    Squamous Epithelials Urine 3 (H) <=1 /HPF    Transitional Epithelials Urine <1 <=1 /HPF    Narrative    Urine Culture not indicated   HCG qualitative pregnancy (blood)     Status: Normal   Result Value Ref Range    hCG Serum Qualitative Negative Negative   Comprehensive metabolic panel     Status: Abnormal   Result Value Ref Range    Sodium 138 136 - 145 mmol/L    Potassium 3.6 3.4 - 5.3 mmol/L    Chloride 101 98 - 107 mmol/L    Carbon Dioxide (CO2) 21 (L) 22 - 29 mmol/L    Anion Gap 16 (H) 7 - 15 mmol/L    Urea Nitrogen 9.9 6.0 - 20.0 mg/dL    Creatinine 0.60 0.51 - 0.95 mg/dL    Calcium 9.3 8.6 - 10.0 mg/dL    Glucose 137 (H) 70 - 99 mg/dL    Alkaline Phosphatase 161 (H) 35 - 104 U/L    AST 19 10 - 35 U/L    ALT 18 10 - 35 U/L    Protein Total 7.3 6.4 - 8.3 g/dL    Albumin 3.9  3.5 - 5.2 g/dL    Bilirubin Total 0.2 <=1.2 mg/dL    GFR Estimate >90 >60 mL/min/1.73m2   Lipase     Status: Normal   Result Value Ref Range    Lipase 15 13 - 60 U/L   Ketone Beta-Hydroxybutyrate Quantitative     Status: Normal   Result Value Ref Range    Ketone (Beta-Hydroxybutyrate) Quantitative <0.18 <=0.30 mmol/L   Blood gas venous     Status: Abnormal   Result Value Ref Range    pH Venous 7.37 7.32 - 7.43    pCO2 Venous 47 40 - 50 mm Hg    pO2 Venous 27 25 - 47 mm Hg    Bicarbonate Venous 27 21 - 28 mmol/L    Base Excess/Deficit (+/-) 1.0 -7.7 - 1.9 mmol/L    FIO2 21     Lactic Acid 4.0 (HH) 0.7 - 2.0 mmol/L   CBC with platelets and differential     Status: Abnormal   Result Value Ref Range    WBC Count 8.8 4.0 - 11.0 10e3/uL    RBC Count 4.18 3.80 - 5.20 10e6/uL    Hemoglobin 11.1 (L) 11.7 - 15.7 g/dL    Hematocrit 35.4 35.0 - 47.0 %    MCV 85 78 - 100 fL    MCH 26.6 26.5 - 33.0 pg    MCHC 31.4 (L) 31.5 - 36.5 g/dL    RDW 15.9 (H) 10.0 - 15.0 %    Platelet Count 241 150 - 450 10e3/uL    % Neutrophils 53 %    % Lymphocytes 40 %    % Monocytes 5 %    % Eosinophils 1 %    % Basophils 1 %    % Immature Granulocytes 0 %    NRBCs per 100 WBC 0 <1 /100    Absolute Neutrophils 4.7 1.6 - 8.3 10e3/uL    Absolute Lymphocytes 3.6 0.8 - 5.3 10e3/uL    Absolute Monocytes 0.5 0.0 - 1.3 10e3/uL    Absolute Eosinophils 0.0 0.0 - 0.7 10e3/uL    Absolute Basophils 0.0 0.0 - 0.2 10e3/uL    Absolute Immature Granulocytes 0.0 <=0.4 10e3/uL    Absolute NRBCs 0.0 10e3/uL   Glucose by meter     Status: Abnormal   Result Value Ref Range    GLUCOSE BY METER POCT 132 (H) 70 - 99 mg/dL   Lactic acid whole blood     Status: Normal   Result Value Ref Range    Lactic Acid 1.9 0.7 - 2.0 mmol/L   Glucose by meter     Status: Normal   Result Value Ref Range    GLUCOSE BY METER POCT 79 70 - 99 mg/dL   EKG 12-lead, tracing only     Status: None   Result Value Ref Range    Systolic Blood Pressure  mmHg    Diastolic Blood Pressure  mmHg     Ventricular Rate 94 BPM    Atrial Rate 94 BPM    CA Interval 134 ms    QRS Duration 70 ms     ms    QTc 467 ms    P Axis 44 degrees    R AXIS 52 degrees    T Axis 36 degrees    Interpretation ECG       Sinus rhythm  Possible Left atrial enlargement  Borderline ECG  Unconfirmed report - interpretation of this ECG is computer generated - see medical record for final interpretation  Confirmed by - EMERGENCY ROOM, PHYSICIAN ([id number]),  CHRISTIE GARY ([id number]) on 1/9/2023 10:37:31 PM     CBC with platelets differential     Status: Abnormal    Narrative    The following orders were created for panel order CBC with platelets differential.  Procedure                               Abnormality         Status                     ---------                               -----------         ------                     CBC with platelets and d...[[id number]]  Abnormal            Final result                 Please view results for these tests on the individual orders.     Medications   0.9% sodium chloride BOLUS (0 mLs Intravenous Stopped 1/9/23 2341)   0.9% sodium chloride BOLUS (0 mLs Intravenous Stopped 1/9/23 2118)   HYDROmorphone (PF) (DILAUDID) injection 0.5 mg (0.5 mg Intravenous Given 1/9/23 1934)   ondansetron (ZOFRAN) injection 4 mg (4 mg Intravenous Given 1/9/23 1934)   ondansetron (ZOFRAN) injection 4 mg (4 mg Intravenous Given 1/9/23 2341)   lidocaine (viscous) (XYLOCAINE) 2 % 15 mL, alum & mag hydroxide-simethicone (MAALOX) 15 mL GI Cocktail (30 mLs Oral Given 1/9/23 2344)     Labs Ordered and Resulted from Time of ED Arrival to Time of ED Departure   ROUTINE UA WITH MICROSCOPIC REFLEX TO CULTURE - Abnormal       Result Value    Color Urine Light Yellow      Appearance Urine Clear      Glucose Urine 500 (*)     Bilirubin Urine Negative      Ketones Urine Trace (*)     Specific Gravity Urine 1.022      Blood Urine Negative      pH Urine 7.5 (*)     Protein Albumin Urine 70 (*)     Urobilinogen Urine Normal       Nitrite Urine Negative      Leukocyte Esterase Urine Negative      Mucus Urine Present (*)     RBC Urine 0      WBC Urine 0      Squamous Epithelials Urine 3 (*)     Transitional Epithelials Urine <1     COMPREHENSIVE METABOLIC PANEL - Abnormal    Sodium 138      Potassium 3.6      Chloride 101      Carbon Dioxide (CO2) 21 (*)     Anion Gap 16 (*)     Urea Nitrogen 9.9      Creatinine 0.60      Calcium 9.3      Glucose 137 (*)     Alkaline Phosphatase 161 (*)     AST 19      ALT 18      Protein Total 7.3      Albumin 3.9      Bilirubin Total 0.2      GFR Estimate >90     BLOOD GAS VENOUS - Abnormal    pH Venous 7.37      pCO2 Venous 47      pO2 Venous 27      Bicarbonate Venous 27      Base Excess/Deficit (+/-) 1.0      FIO2 21      Lactic Acid 4.0 (*)    CBC WITH PLATELETS AND DIFFERENTIAL - Abnormal    WBC Count 8.8      RBC Count 4.18      Hemoglobin 11.1 (*)     Hematocrit 35.4      MCV 85      MCH 26.6      MCHC 31.4 (*)     RDW 15.9 (*)     Platelet Count 241      % Neutrophils 53      % Lymphocytes 40      % Monocytes 5      % Eosinophils 1      % Basophils 1      % Immature Granulocytes 0      NRBCs per 100 WBC 0      Absolute Neutrophils 4.7      Absolute Lymphocytes 3.6      Absolute Monocytes 0.5      Absolute Eosinophils 0.0      Absolute Basophils 0.0      Absolute Immature Granulocytes 0.0      Absolute NRBCs 0.0     GLUCOSE BY METER - Abnormal    GLUCOSE BY METER POCT 132 (*)    HCG QUALITATIVE PREGNANCY - Normal    hCG Serum Qualitative Negative     LIPASE - Normal    Lipase 15     KETONE BETA-HYDROXYBUTYRATE QUANTITATIVE, RAPID - Normal    Ketone (Beta-Hydroxybutyrate) Quantitative <0.18     LACTIC ACID WHOLE BLOOD - Normal    Lactic Acid 1.9     GLUCOSE BY METER - Normal    GLUCOSE BY METER POCT 79       No orders to display          Medical Decision Making  The patient presented with a problem that is a chronic illness mild to moderate exacerbation, progression, or side effect of treatment.    The  patient's evaluation involved:  ordering and review of 3+ test(s) (see separate area of note for details)    The patient's management involved drug therapy requiring intensive monitoring and a decision regarding hospitalization.      Assessment & Plan    23 yo F with a hx of Type 1 DM presenting with nausea, vomiting and concern for possible DKA.   Upon arrival in the emergency department patient is nontoxic-appearing, however is tachycardiac heart rate of 122.    IV was established, comprehensive labs performed, patient given IV fluids.  Labs remarkable for lactate of 4.0.  Will give 2 L normal saline and then repeat lactate.  Normal blood gas, patient not acidotic, ketones negative at <0.18.  No electrolyte abnormalities.  Negative hCG.  Lipase is normal.  Glucose in the emergency department found to be 137.  Urinalysis negative for infection, positive for trace ketones and elevated glucose.      11:36 PM  Pt reassessed. Nausea and pain have improved but not fully resolved. Repeat abdominal exam with mild LUQ tenderness without guarding or other signs of peritonitis.   Will give zofran and GI cocktail now and discharge home with omeprazole for possible gastritis vs. PUD and zofran PRN. Return instructions reviewed.   Repeat vitals much improved. HR down to 85.   Glucose 79  Repeat lactic acid down to 1.9 after 2L IVF.     I have reviewed the nursing notes. I have reviewed the findings, diagnosis, plan and need for follow up with the patient.    Discharge Medication List as of 1/9/2023 11:53 PM      START taking these medications    Details   omeprazole (PRILOSEC) 20 MG DR capsule Take 1 capsule (20 mg) by mouth daily for 14 days, Disp-14 capsule, R-0, E-Prescribe      ondansetron (ZOFRAN ODT) 4 MG ODT tab Take 1 tablet (4 mg) by mouth every 8 hours as needed for nausea or vomiting, Disp-10 tablet, R-0, E-Prescribe             Final diagnoses:   Nausea and vomiting, unspecified vomiting type   Type 1 diabetes  mellitus with hyperglycemia (H)       RASHID Ryan [facility] EMERGENCY DEPARTMENT  1/9/2023    --    ED Attending Physician Attestation    I Lola Liang MD, cared for this patient with the Advanced Practice Provider (DION). I have performed a history and physical examination of the patient independent of the DION. I reviewed the DION's documentation above and agree with the documented findings and plan of care. I personally provided a substantive portion of the care for this patient, including the complete Medical Decision Making. Please see the DION's documentation for full details.    Summary of HPI, PE, ED Course   Patient is a 22 year old female evaluated in the emergency department for nausea, vomiting and hyperglycemia. Exam and ED course notable for no evidene of DKA or serious intraabdominal pathology. Treated with IVF, antiemetic, GI cocktail and PPI with improvement.  After the completion of care in the emergency department, the patient was discharged.    Critical Care & Procedures  Not applicable.    Medical Decision Making  See above          Lola Liang MD  Emergency Medicine        Lola Liang MD  01/11/23 7402

## 2023-01-09 NOTE — ED TRIAGE NOTES
"Triage Assessment & Note:    BP (!) 139/96   Pulse (!) 122   Temp 98.1  F (36.7  C) (Temporal)   Resp 16   Ht 1.499 m (4' 11\")   Wt 53.1 kg (117 lb)   SpO2 98%   BMI 23.63 kg/m      Patient presents with: PT reports 4 day hx of vomiting and today reports elevated BG.     Home Treatments/Remedies: Zofran     Febrile / Afebrile? Afebrile     Duration of C/o:  4 days     Carroll Ingram RN  January 9, 2023         Triage Assessment     Row Name 01/09/23 1724       Triage Assessment (Adult)    Airway WDL WDL       Respiratory WDL    Respiratory WDL WDL       Cardiac WDL    Cardiac WDL WDL              "

## 2023-01-10 ENCOUNTER — TELEPHONE (OUTPATIENT)
Dept: NURSING | Facility: CLINIC | Age: 23
End: 2023-01-10

## 2023-01-10 NOTE — TELEPHONE ENCOUNTER
Questions regarding medications from last night.    Questions answered and patient advised that she needs to schedule a follow up with her PCP.    Aviva Sim RN  [facility]  2:05 PM  1/10/2023

## 2023-01-10 NOTE — ED NOTES
Reviewed discharge instructions with pt and significant other at bedside. All questions addressed. Pt verbalized understanding. Pt ambulatory  left with all personal belongings.

## 2023-01-10 NOTE — DISCHARGE INSTRUCTIONS
Thank you for coming to the [facility] Emergency Department.     Take a clear liquid diet tonight, then if feeling hungry tomorrow you can try bland, solid foods. It may take a few more days to be able to eat your normal diet.     Start the nausea medication, Zofran, every 8 hours as needed and the antacid, Omeprazole, daily for 14 days. This helps if you have gastritis or an early peptic ulcer. Return to the ER if pain become severe, there is blood in vomit, or vomiting is not controlled with zofran.

## 2023-01-14 ENCOUNTER — APPOINTMENT (OUTPATIENT)
Dept: ULTRASOUND IMAGING | Facility: CLINIC | Age: 23
End: 2023-01-14
Attending: NURSE PRACTITIONER
Payer: COMMERCIAL

## 2023-01-14 ENCOUNTER — APPOINTMENT (OUTPATIENT)
Dept: CT IMAGING | Facility: CLINIC | Age: 23
End: 2023-01-14
Attending: NURSE PRACTITIONER
Payer: COMMERCIAL

## 2023-01-14 ENCOUNTER — HOSPITAL ENCOUNTER (OUTPATIENT)
Facility: CLINIC | Age: 23
Setting detail: OBSERVATION
Discharge: HOME | End: 2023-01-15
Attending: EMERGENCY MEDICINE | Admitting: PHYSICIAN ASSISTANT
Payer: COMMERCIAL

## 2023-01-14 DIAGNOSIS — Z11.52 ENCOUNTER FOR SCREENING LABORATORY TESTING FOR SEVERE ACUTE RESPIRATORY SYNDROME CORONAVIRUS 2 (SARS-COV-2): ICD-10-CM

## 2023-01-14 DIAGNOSIS — A04.72 C. DIFFICILE COLITIS: Primary | ICD-10-CM

## 2023-01-14 DIAGNOSIS — R10.84 ABDOMINAL PAIN, GENERALIZED: ICD-10-CM

## 2023-01-14 DIAGNOSIS — R11.2 NAUSEA AND VOMITING, UNSPECIFIED VOMITING TYPE: ICD-10-CM

## 2023-01-14 LAB
ALBUMIN SERPL BCG-MCNC: 4.3 G/DL (ref 3.5–5.2)
ALBUMIN UR-MCNC: 100 MG/DL
ALP SERPL-CCNC: 167 U/L (ref 35–104)
ALT SERPL W P-5'-P-CCNC: 17 U/L (ref 10–35)
AMPHETAMINES UR QL SCN: ABNORMAL
ANION GAP SERPL CALCULATED.3IONS-SCNC: 15 MMOL/L (ref 7–15)
ANION GAP SERPL CALCULATED.3IONS-SCNC: 18 MMOL/L (ref 7–15)
APPEARANCE UR: CLEAR
AST SERPL W P-5'-P-CCNC: 26 U/L (ref 10–35)
B-OH-BUTYR SERPL-MCNC: 0.4 MMOL/L
BARBITURATES UR QL SCN: ABNORMAL
BASE EXCESS BLDV CALC-SCNC: -1.4 MMOL/L (ref -7.7–1.9)
BASOPHILS # BLD AUTO: 0 10E3/UL (ref 0–0.2)
BASOPHILS NFR BLD AUTO: 0 %
BENZODIAZ UR QL SCN: ABNORMAL
BILIRUB SERPL-MCNC: 0.4 MG/DL
BILIRUB UR QL STRIP: NEGATIVE
BUN SERPL-MCNC: 5 MG/DL (ref 6–20)
BUN SERPL-MCNC: 5.8 MG/DL (ref 6–20)
BZE UR QL SCN: ABNORMAL
C DIFF TOX B STL QL: POSITIVE
CALCIUM SERPL-MCNC: 8.7 MG/DL (ref 8.6–10)
CALCIUM SERPL-MCNC: 9 MG/DL (ref 8.6–10)
CANNABINOIDS UR QL SCN: ABNORMAL
CHLORIDE SERPL-SCNC: 104 MMOL/L (ref 98–107)
CHLORIDE SERPL-SCNC: 106 MMOL/L (ref 98–107)
COLOR UR AUTO: YELLOW
CREAT SERPL-MCNC: 0.51 MG/DL (ref 0.51–0.95)
CREAT SERPL-MCNC: 0.54 MG/DL (ref 0.51–0.95)
DEPRECATED HCO3 PLAS-SCNC: 17 MMOL/L (ref 22–29)
DEPRECATED HCO3 PLAS-SCNC: 18 MMOL/L (ref 22–29)
EOSINOPHIL # BLD AUTO: 0.1 10E3/UL (ref 0–0.7)
EOSINOPHIL NFR BLD AUTO: 1 %
ERYTHROCYTE [DISTWIDTH] IN BLOOD BY AUTOMATED COUNT: 15.4 % (ref 10–15)
FLUAV RNA SPEC QL NAA+PROBE: NEGATIVE
FLUBV RNA RESP QL NAA+PROBE: NEGATIVE
GFR SERPL CREATININE-BSD FRML MDRD: >90 ML/MIN/1.73M2
GFR SERPL CREATININE-BSD FRML MDRD: >90 ML/MIN/1.73M2
GLUCOSE BLDC GLUCOMTR-MCNC: 100 MG/DL (ref 70–99)
GLUCOSE BLDC GLUCOMTR-MCNC: 195 MG/DL (ref 70–99)
GLUCOSE BLDC GLUCOMTR-MCNC: 329 MG/DL (ref 70–99)
GLUCOSE SERPL-MCNC: 101 MG/DL (ref 70–99)
GLUCOSE SERPL-MCNC: 103 MG/DL (ref 70–99)
GLUCOSE UR STRIP-MCNC: 30 MG/DL
HCG SERPL QL: NEGATIVE
HCO3 BLDV-SCNC: 25 MMOL/L (ref 21–28)
HCT VFR BLD AUTO: 41.6 % (ref 35–47)
HGB BLD-MCNC: 13.2 G/DL (ref 11.7–15.7)
HGB UR QL STRIP: NEGATIVE
IMM GRANULOCYTES # BLD: 0 10E3/UL
IMM GRANULOCYTES NFR BLD: 0 %
KETONES UR STRIP-MCNC: ABNORMAL MG/DL
LACTATE SERPL-SCNC: 1.4 MMOL/L (ref 0.7–2)
LACTATE SERPL-SCNC: 3 MMOL/L (ref 0.7–2)
LEUKOCYTE ESTERASE UR QL STRIP: ABNORMAL
LIPASE SERPL-CCNC: 24 U/L (ref 13–60)
LYMPHOCYTES # BLD AUTO: 1.5 10E3/UL (ref 0.8–5.3)
LYMPHOCYTES NFR BLD AUTO: 20 %
MAGNESIUM SERPL-MCNC: 1.3 MG/DL (ref 1.7–2.3)
MCH RBC QN AUTO: 26.5 PG (ref 26.5–33)
MCHC RBC AUTO-ENTMCNC: 31.7 G/DL (ref 31.5–36.5)
MCV RBC AUTO: 84 FL (ref 78–100)
MONOCYTES # BLD AUTO: 0.4 10E3/UL (ref 0–1.3)
MONOCYTES NFR BLD AUTO: 5 %
MUCOUS THREADS #/AREA URNS LPF: PRESENT /LPF
NEUTROPHILS # BLD AUTO: 5.7 10E3/UL (ref 1.6–8.3)
NEUTROPHILS NFR BLD AUTO: 74 %
NITRATE UR QL: NEGATIVE
NRBC # BLD AUTO: 0 10E3/UL
NRBC BLD AUTO-RTO: 0 /100
O2/TOTAL GAS SETTING VFR VENT: 21 %
OPIATES UR QL SCN: ABNORMAL
PCO2 BLDV: 47 MM HG (ref 40–50)
PH BLDV: 7.33 [PH] (ref 7.32–7.43)
PH UR STRIP: 6.5 [PH] (ref 5–7)
PHOSPHATE SERPL-MCNC: 3 MG/DL (ref 2.5–4.5)
PLATELET # BLD AUTO: 291 10E3/UL (ref 150–450)
PO2 BLDV: 24 MM HG (ref 25–47)
POTASSIUM SERPL-SCNC: 3.8 MMOL/L (ref 3.4–5.3)
POTASSIUM SERPL-SCNC: 4.3 MMOL/L (ref 3.4–5.3)
PROT SERPL-MCNC: 8.1 G/DL (ref 6.4–8.3)
RBC # BLD AUTO: 4.98 10E6/UL (ref 3.8–5.2)
RBC URINE: 0 /HPF
RSV RNA SPEC NAA+PROBE: NEGATIVE
SARS-COV-2 RNA RESP QL NAA+PROBE: NEGATIVE
SODIUM SERPL-SCNC: 139 MMOL/L (ref 136–145)
SODIUM SERPL-SCNC: 139 MMOL/L (ref 136–145)
SP GR UR STRIP: 1.02 (ref 1–1.03)
SQUAMOUS EPITHELIAL: 5 /HPF
UROBILINOGEN UR STRIP-MCNC: NORMAL MG/DL
WBC # BLD AUTO: 7.7 10E3/UL (ref 4–11)
WBC URINE: 1 /HPF

## 2023-01-14 PROCEDURE — 99285 EMERGENCY DEPT VISIT HI MDM: CPT | Mod: CS | Performed by: NURSE PRACTITIONER

## 2023-01-14 PROCEDURE — 80307 DRUG TEST PRSMV CHEM ANLYZR: CPT | Performed by: PHYSICIAN ASSISTANT

## 2023-01-14 PROCEDURE — 258N000003 HC RX IP 258 OP 636: Performed by: NURSE PRACTITIONER

## 2023-01-14 PROCEDURE — 81001 URINALYSIS AUTO W/SCOPE: CPT | Performed by: NURSE PRACTITIONER

## 2023-01-14 PROCEDURE — 250N000011 HC RX IP 250 OP 636: Performed by: NURSE PRACTITIONER

## 2023-01-14 PROCEDURE — 87506 IADNA-DNA/RNA PROBE TQ 6-11: CPT | Performed by: PHYSICIAN ASSISTANT

## 2023-01-14 PROCEDURE — 93010 ELECTROCARDIOGRAM REPORT: CPT | Performed by: NURSE PRACTITIONER

## 2023-01-14 PROCEDURE — 74177 CT ABD & PELVIS W/CONTRAST: CPT | Mod: 26 | Performed by: RADIOLOGY

## 2023-01-14 PROCEDURE — 250N000009 HC RX 250: Performed by: PHYSICIAN ASSISTANT

## 2023-01-14 PROCEDURE — 82962 GLUCOSE BLOOD TEST: CPT

## 2023-01-14 PROCEDURE — 76705 ECHO EXAM OF ABDOMEN: CPT

## 2023-01-14 PROCEDURE — 96361 HYDRATE IV INFUSION ADD-ON: CPT

## 2023-01-14 PROCEDURE — 87637 SARSCOV2&INF A&B&RSV AMP PRB: CPT | Performed by: PHYSICIAN ASSISTANT

## 2023-01-14 PROCEDURE — 96375 TX/PRO/DX INJ NEW DRUG ADDON: CPT

## 2023-01-14 PROCEDURE — 258N000003 HC RX IP 258 OP 636: Performed by: PHYSICIAN ASSISTANT

## 2023-01-14 PROCEDURE — 87493 C DIFF AMPLIFIED PROBE: CPT | Performed by: PHYSICIAN ASSISTANT

## 2023-01-14 PROCEDURE — 85025 COMPLETE CBC W/AUTO DIFF WBC: CPT | Performed by: EMERGENCY MEDICINE

## 2023-01-14 PROCEDURE — 84100 ASSAY OF PHOSPHORUS: CPT | Performed by: PHYSICIAN ASSISTANT

## 2023-01-14 PROCEDURE — 87324 CLOSTRIDIUM AG IA: CPT | Mod: XU | Performed by: PHYSICIAN ASSISTANT

## 2023-01-14 PROCEDURE — 250N000011 HC RX IP 250 OP 636: Performed by: PHYSICIAN ASSISTANT

## 2023-01-14 PROCEDURE — 258N000003 HC RX IP 258 OP 636: Performed by: EMERGENCY MEDICINE

## 2023-01-14 PROCEDURE — 82803 BLOOD GASES ANY COMBINATION: CPT | Performed by: EMERGENCY MEDICINE

## 2023-01-14 PROCEDURE — 250N000011 HC RX IP 250 OP 636: Performed by: EMERGENCY MEDICINE

## 2023-01-14 PROCEDURE — 83690 ASSAY OF LIPASE: CPT | Performed by: EMERGENCY MEDICINE

## 2023-01-14 PROCEDURE — 250N000012 HC RX MED GY IP 250 OP 636 PS 637: Performed by: PHYSICIAN ASSISTANT

## 2023-01-14 PROCEDURE — 96372 THER/PROPH/DIAG INJ SC/IM: CPT | Performed by: PHYSICIAN ASSISTANT

## 2023-01-14 PROCEDURE — 74177 CT ABD & PELVIS W/CONTRAST: CPT

## 2023-01-14 PROCEDURE — 99285 EMERGENCY DEPT VISIT HI MDM: CPT | Mod: CS,25

## 2023-01-14 PROCEDURE — 96374 THER/PROPH/DIAG INJ IV PUSH: CPT

## 2023-01-14 PROCEDURE — G0378 HOSPITAL OBSERVATION PER HR: HCPCS

## 2023-01-14 PROCEDURE — 84703 CHORIONIC GONADOTROPIN ASSAY: CPT | Performed by: EMERGENCY MEDICINE

## 2023-01-14 PROCEDURE — 82010 KETONE BODYS QUAN: CPT | Performed by: NURSE PRACTITIONER

## 2023-01-14 PROCEDURE — 36415 COLL VENOUS BLD VENIPUNCTURE: CPT | Performed by: EMERGENCY MEDICINE

## 2023-01-14 PROCEDURE — 80053 COMPREHEN METABOLIC PANEL: CPT | Performed by: EMERGENCY MEDICINE

## 2023-01-14 PROCEDURE — 83605 ASSAY OF LACTIC ACID: CPT | Performed by: EMERGENCY MEDICINE

## 2023-01-14 PROCEDURE — 83735 ASSAY OF MAGNESIUM: CPT | Performed by: PHYSICIAN ASSISTANT

## 2023-01-14 PROCEDURE — 93005 ELECTROCARDIOGRAM TRACING: CPT

## 2023-01-14 PROCEDURE — C9803 HOPD COVID-19 SPEC COLLECT: HCPCS

## 2023-01-14 PROCEDURE — C9113 INJ PANTOPRAZOLE SODIUM, VIA: HCPCS | Performed by: NURSE PRACTITIONER

## 2023-01-14 PROCEDURE — 76705 ECHO EXAM OF ABDOMEN: CPT | Mod: 26 | Performed by: RADIOLOGY

## 2023-01-14 RX ORDER — LIDOCAINE 40 MG/G
CREAM TOPICAL
Status: DISCONTINUED | OUTPATIENT
Start: 2023-01-14 | End: 2023-01-15 | Stop reason: HOSPADM

## 2023-01-14 RX ORDER — MORPHINE SULFATE 4 MG/ML
4 INJECTION, SOLUTION INTRAMUSCULAR; INTRAVENOUS
Status: COMPLETED | OUTPATIENT
Start: 2023-01-14 | End: 2023-01-14

## 2023-01-14 RX ORDER — METOCLOPRAMIDE HYDROCHLORIDE 5 MG/ML
5 INJECTION INTRAMUSCULAR; INTRAVENOUS EVERY 6 HOURS
Status: DISCONTINUED | OUTPATIENT
Start: 2023-01-14 | End: 2023-01-14

## 2023-01-14 RX ORDER — ONDANSETRON 4 MG/1
4 TABLET, ORALLY DISINTEGRATING ORAL EVERY 6 HOURS PRN
Status: DISCONTINUED | OUTPATIENT
Start: 2023-01-14 | End: 2023-01-15 | Stop reason: HOSPADM

## 2023-01-14 RX ORDER — MAGNESIUM SULFATE HEPTAHYDRATE 40 MG/ML
4 INJECTION, SOLUTION INTRAVENOUS ONCE
Status: COMPLETED | OUTPATIENT
Start: 2023-01-14 | End: 2023-01-15

## 2023-01-14 RX ORDER — NICOTINE POLACRILEX 4 MG
15-30 LOZENGE BUCCAL
Status: DISCONTINUED | OUTPATIENT
Start: 2023-01-14 | End: 2023-01-15 | Stop reason: HOSPADM

## 2023-01-14 RX ORDER — DEXTROSE MONOHYDRATE 25 G/50ML
25-50 INJECTION, SOLUTION INTRAVENOUS
Status: DISCONTINUED | OUTPATIENT
Start: 2023-01-14 | End: 2023-01-15 | Stop reason: HOSPADM

## 2023-01-14 RX ORDER — SODIUM CHLORIDE, SODIUM LACTATE, POTASSIUM CHLORIDE, CALCIUM CHLORIDE 600; 310; 30; 20 MG/100ML; MG/100ML; MG/100ML; MG/100ML
125 INJECTION, SOLUTION INTRAVENOUS CONTINUOUS
Status: DISCONTINUED | OUTPATIENT
Start: 2023-01-14 | End: 2023-01-15

## 2023-01-14 RX ORDER — ONDANSETRON 4 MG/1
4 TABLET, ORALLY DISINTEGRATING ORAL EVERY 6 HOURS PRN
COMMUNITY
Start: 2021-04-16 | End: 2023-10-21

## 2023-01-14 RX ORDER — SODIUM CHLORIDE, SODIUM LACTATE, POTASSIUM CHLORIDE, CALCIUM CHLORIDE 600; 310; 30; 20 MG/100ML; MG/100ML; MG/100ML; MG/100ML
125 INJECTION, SOLUTION INTRAVENOUS CONTINUOUS
Status: DISCONTINUED | OUTPATIENT
Start: 2023-01-14 | End: 2023-01-15 | Stop reason: HOSPADM

## 2023-01-14 RX ORDER — METOCLOPRAMIDE HYDROCHLORIDE 5 MG/ML
5 INJECTION INTRAMUSCULAR; INTRAVENOUS EVERY 6 HOURS
Status: DISCONTINUED | OUTPATIENT
Start: 2023-01-15 | End: 2023-01-15 | Stop reason: HOSPADM

## 2023-01-14 RX ORDER — ONDANSETRON 2 MG/ML
4 INJECTION INTRAMUSCULAR; INTRAVENOUS EVERY 30 MIN PRN
Status: DISCONTINUED | OUTPATIENT
Start: 2023-01-14 | End: 2023-01-15 | Stop reason: DRUGHIGH

## 2023-01-14 RX ORDER — METOCLOPRAMIDE HYDROCHLORIDE 5 MG/ML
5 INJECTION INTRAMUSCULAR; INTRAVENOUS ONCE
Status: COMPLETED | OUTPATIENT
Start: 2023-01-14 | End: 2023-01-14

## 2023-01-14 RX ORDER — IOPAMIDOL 755 MG/ML
70 INJECTION, SOLUTION INTRAVASCULAR ONCE
Status: COMPLETED | OUTPATIENT
Start: 2023-01-14 | End: 2023-01-14

## 2023-01-14 RX ORDER — ONDANSETRON 2 MG/ML
4 INJECTION INTRAMUSCULAR; INTRAVENOUS EVERY 6 HOURS PRN
Status: DISCONTINUED | OUTPATIENT
Start: 2023-01-14 | End: 2023-01-15 | Stop reason: HOSPADM

## 2023-01-14 RX ORDER — FLUTICASONE PROPIONATE 50 MCG
2 SPRAY, SUSPENSION (ML) NASAL DAILY
COMMUNITY
Start: 2023-01-06 | End: 2023-02-18

## 2023-01-14 RX ORDER — ACETAMINOPHEN 650 MG/1
650 SUPPOSITORY RECTAL EVERY 6 HOURS PRN
Status: DISCONTINUED | OUTPATIENT
Start: 2023-01-14 | End: 2023-01-15 | Stop reason: HOSPADM

## 2023-01-14 RX ORDER — INSULIN GLARGINE 100 [IU]/ML
8 INJECTION, SOLUTION SUBCUTANEOUS DAILY
Status: DISCONTINUED | OUTPATIENT
Start: 2023-01-14 | End: 2023-01-15 | Stop reason: HOSPADM

## 2023-01-14 RX ORDER — ACETAMINOPHEN 325 MG/1
650 TABLET ORAL EVERY 6 HOURS PRN
Status: DISCONTINUED | OUTPATIENT
Start: 2023-01-14 | End: 2023-01-15 | Stop reason: HOSPADM

## 2023-01-14 RX ADMIN — PROCHLORPERAZINE EDISYLATE 10 MG: 5 INJECTION INTRAMUSCULAR; INTRAVENOUS at 16:11

## 2023-01-14 RX ADMIN — INSULIN GLARGINE 8 UNITS: 100 INJECTION, SOLUTION SUBCUTANEOUS at 22:19

## 2023-01-14 RX ADMIN — ONDANSETRON 4 MG: 2 INJECTION INTRAMUSCULAR; INTRAVENOUS at 14:15

## 2023-01-14 RX ADMIN — SODIUM CHLORIDE, POTASSIUM CHLORIDE, SODIUM LACTATE AND CALCIUM CHLORIDE 125 ML/HR: 600; 310; 30; 20 INJECTION, SOLUTION INTRAVENOUS at 21:04

## 2023-01-14 RX ADMIN — MORPHINE SULFATE 4 MG: 4 INJECTION INTRAVENOUS at 14:14

## 2023-01-14 RX ADMIN — SODIUM CHLORIDE, POTASSIUM CHLORIDE, SODIUM LACTATE AND CALCIUM CHLORIDE 1000 ML: 600; 310; 30; 20 INJECTION, SOLUTION INTRAVENOUS at 16:10

## 2023-01-14 RX ADMIN — IOPAMIDOL 70 ML: 755 INJECTION, SOLUTION INTRAVENOUS at 15:35

## 2023-01-14 RX ADMIN — PROMETHAZINE HYDROCHLORIDE 12.5 MG: 25 INJECTION INTRAMUSCULAR; INTRAVENOUS at 21:05

## 2023-01-14 RX ADMIN — MAGNESIUM SULFATE IN WATER 4 G: 40 INJECTION, SOLUTION INTRAVENOUS at 22:08

## 2023-01-14 RX ADMIN — PANTOPRAZOLE SODIUM 40 MG: 40 INJECTION, POWDER, FOR SOLUTION INTRAVENOUS at 14:15

## 2023-01-14 RX ADMIN — METOCLOPRAMIDE 5 MG: 5 INJECTION, SOLUTION INTRAMUSCULAR; INTRAVENOUS at 18:09

## 2023-01-14 RX ADMIN — SODIUM CHLORIDE, POTASSIUM CHLORIDE, SODIUM LACTATE AND CALCIUM CHLORIDE 1000 ML: 600; 310; 30; 20 INJECTION, SOLUTION INTRAVENOUS at 14:21

## 2023-01-14 RX ADMIN — INSULIN ASPART 8 UNITS: 100 INJECTION, SOLUTION INTRAVENOUS; SUBCUTANEOUS at 22:18

## 2023-01-14 ASSESSMENT — ACTIVITIES OF DAILY LIVING (ADL)
ADLS_ACUITY_SCORE: 35

## 2023-01-14 NOTE — ED TRIAGE NOTES
"Came last weekend for acid reflux.  Been having multiple episodes of vomiting.  Says \"passed out twice today\" being light headed.  Severe diarrhea, nausea, and bilateral upper quadrant ABD pain.  T1DM, been treated for DKA in the past.        "

## 2023-01-14 NOTE — LETTER
[facility] [address]  [address] [address]  Phone: [phone]  Fax: [phone]    January 15, 2023        Myriam Wylie  [address]   [facility] [address]          To whom it may concern:    RE: Myriam Wylie    Patient was hospitalized from 1/14/23 to 1/15/23. She cannot return to work while having loose stools. May return to work after her loose stools have resolved for 48 hours.    Please contact me for questions or concerns.      Sincerely,        Isamar Brennan PA-C

## 2023-01-14 NOTE — ED PROVIDER NOTES
ED Provider Note  [facility]      History     Chief Complaint   Patient presents with     Nausea & Vomiting     HPI  Myriam Wylie is a 22 year old female with a past medical history significant for type 1 diabetes, DKA, PID who presents with 4 days of nausea and vomiting. She also reports her vomiting has worsened over the past 2 days and starting this AM she has developed diarrhea with multiple episodes of loose stools. With her severe vomiting she reports feeling like she might pass out. She reports ongoing upper quadrant abdominal pain that has been on going since she was last seen in ED 5 days ago.    She reports she has been out of Zofran, but Zofran ODT does make her nausea worse. She reports that her blood glucose have been running near baseline, 100-200's, though she has not checked her blood sugar today.     Past Medical History  Past Medical History:   Diagnosis Date     C. difficile colitis      Diabetes type 1, uncontrolled      DKA (diabetic ketoacidosis) (H)      PID (acute pelvic inflammatory disease)      Past Surgical History:   Procedure Laterality Date     COLONOSCOPY N/A 9/18/2019    Procedure: COLONOSCOPY;  Surgeon: Jeremi Banks MD;  Location: [facility]      ESOPHAGOSCOPY, GASTROSCOPY, DUODENOSCOPY (EGD), COMBINED N/A 9/18/2019    Procedure: Upper endoscopy and colonoscopy with biopsy;  Surgeon: Jeremi Banks MD;  Location: [facility]      acetone, Urine, test STRP  blood glucose monitoring (ACCU-CHEK FASTCLIX) lancets  blood glucose monitoring (ACCU-CHEK HENRI SMARTVIEW) meter device kit  blood glucose monitoring (ACCU-CHEK SMARTVIEW) test strip  Glucagon 0.5 MG/0.1ML SOSY  insulin aspart (NOVOLOG PEN) 100 UNIT/ML pen  insulin aspart (NOVOLOG PEN) 100 UNIT/ML pen  insulin glargine (BASAGLAR KWIKPEN) 100 UNIT/ML pen  insulin pen needle (BD HENRI U/F) 32G X 4 MM  omeprazole (PRILOSEC) 20 MG DR capsule  Prenatal Vit-Fe Fumarate-FA (PRENATAL MULTIVITAMIN  "W/IRON) 27-0.8 MG tablet  pyridOXINE (VITAMIN B6) 25 MG tablet      No Known Allergies  Family History  Family History   Problem Relation Age of Onset     Diabetes No family hx of         type 1 diabetes or autoimmunity     Social History   Social History     Tobacco Use     Smoking status: Never     Smokeless tobacco: Never   Substance Use Topics     Alcohol use: Yes     Drug use: Yes     Types: Marijuana         A medically appropriate review of systems was performed with pertinent positives and negatives noted in the HPI, and all other systems negative.    Physical Exam   BP: (!) 139/91  Pulse: 100  Temp: 98.3  F (36.8  C)  Resp: 24  Height: 149.9 cm (4' 11\")  Weight: 52.2 kg (115 lb)  SpO2: 99 %  Physical Exam  Vitals and nursing note reviewed.   Constitutional:       Comments: Appears in discomfort   HENT:      Head: Normocephalic.      Right Ear: External ear normal.      Left Ear: External ear normal.      Nose: Nose normal.      Mouth/Throat:      Mouth: Mucous membranes are moist.   Eyes:      Conjunctiva/sclera: Conjunctivae normal.   Cardiovascular:      Rate and Rhythm: Regular rhythm. Tachycardia present.      Pulses: Normal pulses.      Heart sounds: Normal heart sounds.   Pulmonary:      Effort: Pulmonary effort is normal.      Breath sounds: Normal breath sounds.   Abdominal:      General: Abdomen is flat. Bowel sounds are normal.      Tenderness: There is abdominal tenderness in the right upper quadrant, right lower quadrant, left upper quadrant and left lower quadrant. There is guarding.      Comments: LUQ>RUQ abdominal tenderness of exam. With some volitional guarding.   Musculoskeletal:         General: Normal range of motion.      Cervical back: Normal range of motion and neck supple.   Skin:     General: Skin is warm.   Neurological:      Mental Status: She is alert and oriented to person, place, and time.           ED Course, Procedures, & Data      Procedures       ED Course Selections:       "  EKG Interpretation:      Interpreted by RASHID Smith CNP  Time reviewed: 1530  Symptoms at time of EKG: None   Rhythm: normal sinus   Rate: Normal  Axis: Normal  Ectopy: none  Conduction: normal  ST Segments/ T Waves: No ST-T wave changes  Q Waves: none  Comparison to prior: Unchanged from 01/09/23  Clinical Impression: no acute changes         Results for orders placed or performed during the hospital encounter of 01/14/23   CT Abdomen Pelvis w Contrast     Status: None    Narrative    EXAMINATION: CT ABDOMEN PELVIS W CONTRAST, 1/14/2023 3:48 PM    INDICATION: Diffuse upper quadrant abdominal pain.    COMPARISON STUDY: 10/31/2022    TECHNIQUE: CT scan of the abdomen and pelvis was performed on  multidetector CT scanner using volumetric acquisition technique and  images were reconstructed in multiple planes with variable thickness  and reviewed on dedicated workstations.     CONTRAST: iopamidol (ISOVUE-370) solution 70 mL injected IV with oral  contrast    CT scan radiation dose is optimized to minimum requisite dose using  automated dose modulation techniques.    FINDINGS:    Lower thorax: Normal.    Liver: No mass. Hepatomegaly with 19.5 cm in craniocaudal dimension,  enlarged from prior on 10/31/2022 when it measured 17.7 cm.    Biliary System: Normal gallbladder. No extrahepatic biliary ductal  dilation.    Pancreas: No mass or pancreatic ductal dilation.    Adrenal glands: No mass or nodules    Spleen: Normal.    Kidneys: No suspicious mass, obstructing calculus or hydronephrosis.    Gastrointestinal tract :Normal appendix. Normal caliber small bowel.    Mesentery/peritoneum/retroperitoneum: No mass. No free air.  Small  amount of free pelvic fluid.    Lymph nodes: Prominent mesenteric lymph nodes.    Vasculature: Patent major abdominal vasculature.    Pelvis: Urinary bladder is normal. Uterus and adnexa within normal  limits.    Osseous structures: No aggressive or acute osseous lesion.      Soft  tissues: Within normal limits.      Impression    IMPRESSION:   1. Hepatomegaly, increased in size from prior CT 10/31/2022. Consider  clinical correlation for hepatitis.  2. Cluster of prominent mesenteric nodes is nonspecific but can be  seen in mesenteric lymphadenitis.   3. Small amount of pelvic free fluid may be physiologic.    I have personally reviewed the examination and initial interpretation  and I agree with the findings.    LUCI CLARK MD         SYSTEM ID:  [id number]   US Abdomen Limited (RUQ)     Status: None    Narrative    EXAMINATION: Limited Abdominal Ultrasound, 1/14/2023 5:50 PM     COMPARISON: Same day CT    HISTORY:    New CT findings showing increase in liver size    FINDINGS:   Fluid: No evidence of ascites or pleural effusions.    Liver: The liver demonstrates normal homogeneous echotexture,  measuring 18.2 cm in craniocaudal dimension. There is no focal mass.     Gallbladder: There is no wall thickening, pericholecystic fluid,  positive sonographic Pham's sign or evidence for cholelithiasis.    Bile Ducts: Both the intra- and extrahepatic biliary system are of  normal caliber.  The common bile duct measures 4 mm in diameter.    Pancreas: Visualized portions of the head and body of the pancreas are  unremarkable.     Kidney: The right kidney measures 9.7 cm long. There is no  hydronephrosis or hydroureter, no shadowing renal calculi, cystic  lesion or mass.       Impression    IMPRESSION:   1.  Hepatomegaly without focal hepatic lesions.  2.  Otherwise unremarkable right upper quadrant ultrasound.    I have personally reviewed the examination and initial interpretation  and I agree with the findings.    LUCI CLARK MD         SYSTEM ID:  [id number]   Comprehensive metabolic panel     Status: Abnormal   Result Value Ref Range    Sodium 139 136 - 145 mmol/L    Potassium 3.8 3.4 - 5.3 mmol/L    Chloride 104 98 - 107 mmol/L    Carbon Dioxide (CO2) 17 (L) 22 - 29 mmol/L    Anion Gap 18 (H)  7 - 15 mmol/L    Urea Nitrogen 5.8 (L) 6.0 - 20.0 mg/dL    Creatinine 0.54 0.51 - 0.95 mg/dL    Calcium 9.0 8.6 - 10.0 mg/dL    Glucose 101 (H) 70 - 99 mg/dL    Alkaline Phosphatase 167 (H) 35 - 104 U/L    AST 26 10 - 35 U/L    ALT 17 10 - 35 U/L    Protein Total 8.1 6.4 - 8.3 g/dL    Albumin 4.3 3.5 - 5.2 g/dL    Bilirubin Total 0.4 <=1.2 mg/dL    GFR Estimate >90 >60 mL/min/1.73m2   Lipase     Status: Normal   Result Value Ref Range    Lipase 24 13 - 60 U/L   Lactic acid whole blood     Status: Abnormal   Result Value Ref Range    Lactic Acid 3.0 (H) 0.7 - 2.0 mmol/L   HCG qualitative Blood     Status: Normal   Result Value Ref Range    hCG Serum Qualitative Negative Negative   UA with Microscopic reflex to Culture     Status: Abnormal    Specimen: Urine, Clean Catch   Result Value Ref Range    Color Urine Yellow Colorless, Straw, Light Yellow, Yellow    Appearance Urine Clear Clear    Glucose Urine 30 (A) Negative mg/dL    Bilirubin Urine Negative Negative    Ketones Urine Trace (A) Negative mg/dL    Specific Gravity Urine 1.019 1.003 - 1.035    Blood Urine Negative Negative    pH Urine 6.5 5.0 - 7.0    Protein Albumin Urine 100 (A) Negative mg/dL    Urobilinogen Urine Normal Normal, 2.0 mg/dL    Nitrite Urine Negative Negative    Leukocyte Esterase Urine Trace (A) Negative    Mucus Urine Present (A) None Seen /LPF    RBC Urine 0 <=2 /HPF    WBC Urine 1 <=5 /HPF    Squamous Epithelials Urine 5 (H) <=1 /HPF    Narrative    Urine Culture not indicated   Ketone Beta-Hydroxybutyrate Quantitative     Status: Abnormal   Result Value Ref Range    Ketone (Beta-Hydroxybutyrate) Quantitative 0.40 (H) <=0.30 mmol/L   CBC with platelets and differential     Status: Abnormal   Result Value Ref Range    WBC Count 7.7 4.0 - 11.0 10e3/uL    RBC Count 4.98 3.80 - 5.20 10e6/uL    Hemoglobin 13.2 11.7 - 15.7 g/dL    Hematocrit 41.6 35.0 - 47.0 %    MCV 84 78 - 100 fL    MCH 26.5 26.5 - 33.0 pg    MCHC 31.7 31.5 - 36.5 g/dL    RDW  15.4 (H) 10.0 - 15.0 %    Platelet Count 291 150 - 450 10e3/uL    % Neutrophils 74 %    % Lymphocytes 20 %    % Monocytes 5 %    % Eosinophils 1 %    % Basophils 0 %    % Immature Granulocytes 0 %    NRBCs per 100 WBC 0 <1 /100    Absolute Neutrophils 5.7 1.6 - 8.3 10e3/uL    Absolute Lymphocytes 1.5 0.8 - 5.3 10e3/uL    Absolute Monocytes 0.4 0.0 - 1.3 10e3/uL    Absolute Eosinophils 0.1 0.0 - 0.7 10e3/uL    Absolute Basophils 0.0 0.0 - 0.2 10e3/uL    Absolute Immature Granulocytes 0.0 <=0.4 10e3/uL    Absolute NRBCs 0.0 10e3/uL   Glucose by meter     Status: Abnormal   Result Value Ref Range    GLUCOSE BY METER POCT 100 (H) 70 - 99 mg/dL   Blood gas venous     Status: Abnormal   Result Value Ref Range    pH Venous 7.33 7.32 - 7.43    pCO2 Venous 47 40 - 50 mm Hg    pO2 Venous 24 (L) 25 - 47 mm Hg    Bicarbonate Venous 25 21 - 28 mmol/L    Base Excess/Deficit (+/-) -1.4 -7.7 - 1.9 mmol/L    FIO2 21    Basic metabolic panel     Status: Abnormal   Result Value Ref Range    Sodium 139 136 - 145 mmol/L    Potassium 4.3 3.4 - 5.3 mmol/L    Chloride 106 98 - 107 mmol/L    Carbon Dioxide (CO2) 18 (L) 22 - 29 mmol/L    Anion Gap 15 7 - 15 mmol/L    Urea Nitrogen 5.0 (L) 6.0 - 20.0 mg/dL    Creatinine 0.51 0.51 - 0.95 mg/dL    Calcium 8.7 8.6 - 10.0 mg/dL    Glucose 103 (H) 70 - 99 mg/dL    GFR Estimate >90 >60 mL/min/1.73m2   Lactic acid whole blood     Status: Normal   Result Value Ref Range    Lactic Acid 1.4 0.7 - 2.0 mmol/L   EKG 12-lead, tracing only     Status: None (Preliminary result)   Result Value Ref Range    Systolic Blood Pressure  mmHg    Diastolic Blood Pressure  mmHg    Ventricular Rate 89 BPM    Atrial Rate 89 BPM    AL Interval 142 ms    QRS Duration 66 ms     ms    QTc 479 ms    P Axis 44 degrees    R AXIS 44 degrees    T Axis 40 degrees    Interpretation ECG       Sinus rhythm  Possible Left atrial enlargement  Borderline ECG     CBC with platelets differential     Status: Abnormal    Narrative     The following orders were created for panel order CBC with platelets differential.  Procedure                               Abnormality         Status                     ---------                               -----------         ------                     CBC with platelets and d...[[id number]]  Abnormal            Final result                 Please view results for these tests on the individual orders.     Medications   lactated ringers BOLUS 1,000 mL (0 mLs Intravenous Stopped 1/14/23 1609)     Followed by   lactated ringers infusion (has no administration in time range)   ondansetron (ZOFRAN) injection 4 mg (4 mg Intravenous Given 1/14/23 1415)   lidocaine (viscous) (XYLOCAINE) 2 % 15 mL, alum & mag hydroxide-simethicone (MAALOX) 15 mL GI Cocktail (has no administration in time range)   lactated ringers BOLUS 1,000 mL (0 mLs Intravenous Stopped 1/14/23 1710)     Followed by   lactated ringers infusion (has no administration in time range)   morphine (PF) injection 4 mg (4 mg Intravenous Given 1/14/23 1414)   pantoprazole (PROTONIX) IV push injection 40 mg (40 mg Intravenous Given 1/14/23 1415)   iopamidol (ISOVUE-370) solution 70 mL (70 mLs Intravenous Given 1/14/23 1535)   sodium chloride (PF) 0.9% PF flush 68 mL (68 mLs Intravenous Given 1/14/23 1534)   prochlorperazine (COMPAZINE) injection 10 mg (10 mg Intravenous Given 1/14/23 1611)   metoclopramide (REGLAN) injection 5 mg (5 mg Intravenous Given 1/14/23 1809)     Labs Ordered and Resulted from Time of ED Arrival to Time of ED Departure   COMPREHENSIVE METABOLIC PANEL - Abnormal       Result Value    Sodium 139      Potassium 3.8      Chloride 104      Carbon Dioxide (CO2) 17 (*)     Anion Gap 18 (*)     Urea Nitrogen 5.8 (*)     Creatinine 0.54      Calcium 9.0      Glucose 101 (*)     Alkaline Phosphatase 167 (*)     AST 26      ALT 17      Protein Total 8.1      Albumin 4.3      Bilirubin Total 0.4      GFR Estimate >90     LACTIC ACID WHOLE BLOOD -  Abnormal    Lactic Acid 3.0 (*)    ROUTINE UA WITH MICROSCOPIC REFLEX TO CULTURE - Abnormal    Color Urine Yellow      Appearance Urine Clear      Glucose Urine 30 (*)     Bilirubin Urine Negative      Ketones Urine Trace (*)     Specific Gravity Urine 1.019      Blood Urine Negative      pH Urine 6.5      Protein Albumin Urine 100 (*)     Urobilinogen Urine Normal      Nitrite Urine Negative      Leukocyte Esterase Urine Trace (*)     Mucus Urine Present (*)     RBC Urine 0      WBC Urine 1      Squamous Epithelials Urine 5 (*)    KETONE BETA-HYDROXYBUTYRATE QUANTITATIVE, RAPID - Abnormal    Ketone (Beta-Hydroxybutyrate) Quantitative 0.40 (*)    CBC WITH PLATELETS AND DIFFERENTIAL - Abnormal    WBC Count 7.7      RBC Count 4.98      Hemoglobin 13.2      Hematocrit 41.6      MCV 84      MCH 26.5      MCHC 31.7      RDW 15.4 (*)     Platelet Count 291      % Neutrophils 74      % Lymphocytes 20      % Monocytes 5      % Eosinophils 1      % Basophils 0      % Immature Granulocytes 0      NRBCs per 100 WBC 0      Absolute Neutrophils 5.7      Absolute Lymphocytes 1.5      Absolute Monocytes 0.4      Absolute Eosinophils 0.1      Absolute Basophils 0.0      Absolute Immature Granulocytes 0.0      Absolute NRBCs 0.0     GLUCOSE BY METER - Abnormal    GLUCOSE BY METER POCT 100 (*)    BLOOD GAS VENOUS - Abnormal    pH Venous 7.33      pCO2 Venous 47      pO2 Venous 24 (*)     Bicarbonate Venous 25      Base Excess/Deficit (+/-) -1.4      FIO2 21     BASIC METABOLIC PANEL - Abnormal    Sodium 139      Potassium 4.3      Chloride 106      Carbon Dioxide (CO2) 18 (*)     Anion Gap 15      Urea Nitrogen 5.0 (*)     Creatinine 0.51      Calcium 8.7      Glucose 103 (*)     GFR Estimate >90     LIPASE - Normal    Lipase 24     HCG QUALITATIVE PREGNANCY - Normal    hCG Serum Qualitative Negative     LACTIC ACID WHOLE BLOOD - Normal    Lactic Acid 1.4     INFLUENZA A/B & SARS-COV2 PCR MULTIPLEX   GLUCOSE BY METER POCT     US  Abdomen Limited (RUQ)   Final Result   IMPRESSION:    1.  Hepatomegaly without focal hepatic lesions.   2.  Otherwise unremarkable right upper quadrant ultrasound.      I have personally reviewed the examination and initial interpretation   and I agree with the findings.      LUCI CLARK MD            SYSTEM ID:  [id number]      CT Abdomen Pelvis w Contrast   Final Result   IMPRESSION:    1. Hepatomegaly, increased in size from prior CT 10/31/2022. Consider   clinical correlation for hepatitis.   2. Cluster of prominent mesenteric nodes is nonspecific but can be   seen in mesenteric lymphadenitis.    3. Small amount of pelvic free fluid may be physiologic.      I have personally reviewed the examination and initial interpretation   and I agree with the findings.      LUCI CLARK MD            SYSTEM ID:  [id number]             Medical Decision Making  The patient presented with a problem that is an acute illness with systemic symptoms.    The patient's evaluation involved:  an assessment requiring an independent historian (see separate area of note for details)  review of 3+ prior external note(s) (Previous ED notes, and clinic notes.)  ordering and review of 3+ test(s) (see separate area of note for details)  review of 3+ test result(s) ordered prior to this encounter (Previous VBG, lactic acid, serum ketones, and other ED results from previous ED visits.)  independent interpretation of testing performed by another health professional (CTAP and US)    The patient's management involved a decision regarding hospitalization.      Assessment & Plan    22 year old female w/ PMH notable for type 1 diabetes, DKA, PID.    Clinically, patient appears uncomfortable due to nausea and pain. Vital signs notable for mild tachycardia without hypotension. Otherwise on examination she had LUQ > RUQ abdominal pain on exam. Lungs were clear on examination, no murmurs heard on cardiac exam.     Ddx includes, but not limited to DKA,  gastro-enteritis, hyperglycemia.    She had an IV placed with a CBC, CMP, lipase, lactic acid, serum ketones, UA and serum pregnancy ordered. She also was given an initial dose of IV Zofran for nausea and morphine for abdominal pain and Protonix. Following initial dosing of pain medication and anti nausea medication, she reports pain improved, but she continues to have nausea and abdominal pain. At this time a CTAP was ordered as well.    Her finger stick glucose showed a blood sugar of 100. Her CBC was unremarkable without leukocytosis. Her lipase was not elevated, and she was not pregnant. Her initial CMP did show an anion gap of 18, and her initial lactic acid was 3. Her serum ketones were mildly elevated at 0.4. Her VBG showed a normal pH and bicarb. Her UA was positive for a small amount of glucose and ketones.    While her serum ketones were mildly elevated, with a mild anion gap with a with normal pH and bicarb, less concern for DKA and more likely these were the lab results were from decreased p.o. intake in the setting of nausea and vomiting.  She was given 2 liters of lactated ringer solution, and a recheck of her lactic acid following this showed an improvement in lactic acid to 1.4.  Her repeat metabolic panel also showed an improving anion gap that had resolved. Final radiology read of her CTAP showed no intraabdominal processes, but did show hepatomegaly, with an increase in size from prior CT 10/31/2022. Following this did obtain a RUQ abdominal ultrasound which showed hepatomegaly without focal hepatic lesions.    When she did initially report some improvement in nausea with just Zofran, we attempted Compazine as well as Reglan.  She reports continued nausea and at least one episode of emesis following attempting to eat and drink.  With her continued nausea and difficulty maintaining p.o. status I discussed with the observation provider about bringing patient into observation overnight for her nausea  vomiting. I reviewed patient and presentation, current state of workup and any pending studies. They will admit for further evaluation/management.     I reviewed the available findings, plan for over night observation for her nausea and vomiting.    I have reviewed the nursing notes. I have reviewed the findings, diagnosis, plan and need for follow up with the patient.    New Prescriptions    No medications on file       Final diagnoses:   Abdominal pain, generalized   Nausea and vomiting, unspecified vomiting type     RASHID Smith CNP  [facility] EMERGENCY DEPARTMENT  1/14/2023     Chepe Mitchell APRN CNP  01/14/23 2001  -==========    --    ED Attending Physician Attestation    I Eric Carrasquillo MD, cared for this patient with the Advanced Practice Provider (DION). I have performed a history and physical examination of the patient independent of the DION. I reviewed the DION's documentation above and agree with the documented findings and plan of care. I personally provided a substantive portion of the care for this patient, including the complete Medical Decision Making. Please see the DION's documentation for full details.    Summary of HPI, PE, ED Course   Patient is a 22 year old female evaluated in the emergency department for nausea vomiting and abdominal pain as above. Exam and ED course notable for vomiting and abdominal tenderness as above. After the completion of care in the emergency department, the patient was admitted to inpatient.    Critical Care & Procedures  Critical Care Addendum    My initial assessment, based on my review of nursing observations, review of vital signs and discussion with admitting inpatient team, established that Myriam Wylie has and electrolyte derangement, diabetic ketoacidosis and surgical abdomen, which requires immediate intervention, and therefore she is critically ill.     After the initial assessment, the care team initiated multiple lab tests,  initiated IV fluid administration and initiated medication therapy with iv antiemetics and pain medication to provide stabilization care. Due to the critical nature of this patient, I reassessed nursing observations, vital signs and physical exam multiple times prior to her disposition.     Time also spent performing documentation, reviewing test results and coordination of care.     Critical care time (excluding teaching time and procedures): 35 minutes.     Medical Decision Making  The patient presented with a problem that is an acute illness with systemic symptoms, an acute complicated injury and an acute health issue posing potential threat to life or bodily function.    The patient's evaluation involved:  an assessment requiring an independent historian (see separate area of note for details)    The patient's management involved a parenteral controlled substance, drug therapy requiring intensive monitoring and a decision regarding hospitalization.          Eric Carrasquillo MD  Emergency Medicine        Eric Carrasquillo MD  01/19/23 0916

## 2023-01-15 VITALS
HEART RATE: 85 BPM | DIASTOLIC BLOOD PRESSURE: 86 MMHG | HEIGHT: 59 IN | OXYGEN SATURATION: 100 % | SYSTOLIC BLOOD PRESSURE: 128 MMHG | RESPIRATION RATE: 14 BRPM | TEMPERATURE: 98.4 F | BODY MASS INDEX: 24.58 KG/M2 | WEIGHT: 121.91 LBS

## 2023-01-15 LAB
ALBUMIN SERPL BCG-MCNC: 3.2 G/DL (ref 3.5–5.2)
ALP SERPL-CCNC: 131 U/L (ref 35–104)
ALT SERPL W P-5'-P-CCNC: 14 U/L (ref 10–35)
ANION GAP SERPL CALCULATED.3IONS-SCNC: 15 MMOL/L (ref 7–15)
AST SERPL W P-5'-P-CCNC: 21 U/L (ref 10–35)
BASOPHILS # BLD AUTO: 0 10E3/UL (ref 0–0.2)
BASOPHILS NFR BLD AUTO: 0 %
BILIRUB SERPL-MCNC: 0.3 MG/DL
BUN SERPL-MCNC: 4.1 MG/DL (ref 6–20)
C COLI+JEJUNI+LARI FUSA STL QL NAA+PROBE: NOT DETECTED
C DIFF GDH STL QL IA: POSITIVE
C DIFF TOX A+B STL QL IA: POSITIVE
CALCIUM SERPL-MCNC: 8.3 MG/DL (ref 8.6–10)
CHLORIDE SERPL-SCNC: 105 MMOL/L (ref 98–107)
CREAT SERPL-MCNC: 0.6 MG/DL (ref 0.51–0.95)
CRP SERPL-MCNC: 75.6 MG/L
DEPRECATED HCO3 PLAS-SCNC: 18 MMOL/L (ref 22–29)
EC STX1 GENE STL QL NAA+PROBE: NOT DETECTED
EC STX2 GENE STL QL NAA+PROBE: NOT DETECTED
EOSINOPHIL # BLD AUTO: 0.1 10E3/UL (ref 0–0.7)
EOSINOPHIL NFR BLD AUTO: 1 %
ERYTHROCYTE [DISTWIDTH] IN BLOOD BY AUTOMATED COUNT: 15.6 % (ref 10–15)
GFR SERPL CREATININE-BSD FRML MDRD: >90 ML/MIN/1.73M2
GLUCOSE BLDC GLUCOMTR-MCNC: 112 MG/DL (ref 70–99)
GLUCOSE BLDC GLUCOMTR-MCNC: 126 MG/DL (ref 70–99)
GLUCOSE BLDC GLUCOMTR-MCNC: 139 MG/DL (ref 70–99)
GLUCOSE BLDC GLUCOMTR-MCNC: 159 MG/DL (ref 70–99)
GLUCOSE BLDC GLUCOMTR-MCNC: 216 MG/DL (ref 70–99)
GLUCOSE BLDC GLUCOMTR-MCNC: 67 MG/DL (ref 70–99)
GLUCOSE BLDC GLUCOMTR-MCNC: 84 MG/DL (ref 70–99)
GLUCOSE SERPL-MCNC: 101 MG/DL (ref 70–99)
HBA1C MFR BLD: 11.4 %
HCT VFR BLD AUTO: 34 % (ref 35–47)
HGB BLD-MCNC: 10.4 G/DL (ref 11.7–15.7)
IMM GRANULOCYTES # BLD: 0 10E3/UL
IMM GRANULOCYTES NFR BLD: 0 %
LYMPHOCYTES # BLD AUTO: 1.7 10E3/UL (ref 0.8–5.3)
LYMPHOCYTES NFR BLD AUTO: 24 %
MAGNESIUM SERPL-MCNC: 2.1 MG/DL (ref 1.7–2.3)
MAGNESIUM SERPL-MCNC: 2.3 MG/DL (ref 1.7–2.3)
MCH RBC QN AUTO: 26.1 PG (ref 26.5–33)
MCHC RBC AUTO-ENTMCNC: 30.6 G/DL (ref 31.5–36.5)
MCV RBC AUTO: 85 FL (ref 78–100)
MONOCYTES # BLD AUTO: 0.4 10E3/UL (ref 0–1.3)
MONOCYTES NFR BLD AUTO: 6 %
NEUTROPHILS # BLD AUTO: 4.9 10E3/UL (ref 1.6–8.3)
NEUTROPHILS NFR BLD AUTO: 69 %
NOROV GI+II ORF1-ORF2 JNC STL QL NAA+PR: DETECTED
NRBC # BLD AUTO: 0 10E3/UL
NRBC BLD AUTO-RTO: 0 /100
PHOSPHATE SERPL-MCNC: 3.7 MG/DL (ref 2.5–4.5)
PLATELET # BLD AUTO: 241 10E3/UL (ref 150–450)
POTASSIUM SERPL-SCNC: 3.8 MMOL/L (ref 3.4–5.3)
PROT SERPL-MCNC: 6 G/DL (ref 6.4–8.3)
RBC # BLD AUTO: 3.99 10E6/UL (ref 3.8–5.2)
RVA NSP5 STL QL NAA+PROBE: NOT DETECTED
SALMONELLA SP RPOD STL QL NAA+PROBE: NOT DETECTED
SHIGELLA SP+EIEC IPAH STL QL NAA+PROBE: NOT DETECTED
SODIUM SERPL-SCNC: 138 MMOL/L (ref 136–145)
V CHOL+PARA RFBL+TRKH+TNAA STL QL NAA+PR: NOT DETECTED
WBC # BLD AUTO: 7.1 10E3/UL (ref 4–11)
Y ENTERO RECN STL QL NAA+PROBE: NOT DETECTED

## 2023-01-15 PROCEDURE — 250N000011 HC RX IP 250 OP 636: Performed by: PHYSICIAN ASSISTANT

## 2023-01-15 PROCEDURE — 36415 COLL VENOUS BLD VENIPUNCTURE: CPT | Performed by: PHYSICIAN ASSISTANT

## 2023-01-15 PROCEDURE — 258N000003 HC RX IP 258 OP 636: Performed by: PHYSICIAN ASSISTANT

## 2023-01-15 PROCEDURE — 85025 COMPLETE CBC W/AUTO DIFF WBC: CPT | Performed by: PHYSICIAN ASSISTANT

## 2023-01-15 PROCEDURE — 86140 C-REACTIVE PROTEIN: CPT | Performed by: PHYSICIAN ASSISTANT

## 2023-01-15 PROCEDURE — 83735 ASSAY OF MAGNESIUM: CPT | Performed by: PHYSICIAN ASSISTANT

## 2023-01-15 PROCEDURE — 80053 COMPREHEN METABOLIC PANEL: CPT | Performed by: PHYSICIAN ASSISTANT

## 2023-01-15 PROCEDURE — 84100 ASSAY OF PHOSPHORUS: CPT | Performed by: PHYSICIAN ASSISTANT

## 2023-01-15 PROCEDURE — 250N000013 HC RX MED GY IP 250 OP 250 PS 637: Performed by: PHYSICIAN ASSISTANT

## 2023-01-15 PROCEDURE — 96376 TX/PRO/DX INJ SAME DRUG ADON: CPT

## 2023-01-15 PROCEDURE — C9113 INJ PANTOPRAZOLE SODIUM, VIA: HCPCS | Performed by: PHYSICIAN ASSISTANT

## 2023-01-15 PROCEDURE — 82962 GLUCOSE BLOOD TEST: CPT | Mod: 91

## 2023-01-15 PROCEDURE — 83036 HEMOGLOBIN GLYCOSYLATED A1C: CPT | Performed by: PHYSICIAN ASSISTANT

## 2023-01-15 PROCEDURE — G0378 HOSPITAL OBSERVATION PER HR: HCPCS

## 2023-01-15 PROCEDURE — 999N000248 HC STATISTIC IV INSERT WITH US BY RN

## 2023-01-15 RX ORDER — VANCOMYCIN HYDROCHLORIDE 125 MG/1
125 CAPSULE ORAL 4 TIMES DAILY
Qty: 40 CAPSULE | Refills: 0 | Status: SHIPPED | OUTPATIENT
Start: 2023-01-15 | End: 2023-01-25

## 2023-01-15 RX ORDER — VANCOMYCIN HYDROCHLORIDE 125 MG/1
125 CAPSULE ORAL 4 TIMES DAILY
Status: DISCONTINUED | OUTPATIENT
Start: 2023-01-15 | End: 2023-01-15 | Stop reason: HOSPADM

## 2023-01-15 RX ADMIN — PROMETHAZINE HYDROCHLORIDE 12.5 MG: 25 INJECTION INTRAMUSCULAR; INTRAVENOUS at 04:06

## 2023-01-15 RX ADMIN — VANCOMYCIN HYDROCHLORIDE 125 MG: 125 CAPSULE ORAL at 10:45

## 2023-01-15 RX ADMIN — INSULIN ASPART 4 UNITS: 100 INJECTION, SOLUTION INTRAVENOUS; SUBCUTANEOUS at 10:44

## 2023-01-15 RX ADMIN — VANCOMYCIN HYDROCHLORIDE 125 MG: 125 CAPSULE ORAL at 04:06

## 2023-01-15 RX ADMIN — PANTOPRAZOLE SODIUM 40 MG: 40 INJECTION, POWDER, FOR SOLUTION INTRAVENOUS at 08:42

## 2023-01-15 RX ADMIN — METOCLOPRAMIDE 5 MG: 5 INJECTION, SOLUTION INTRAMUSCULAR; INTRAVENOUS at 02:01

## 2023-01-15 RX ADMIN — PROMETHAZINE HYDROCHLORIDE 12.5 MG: 25 INJECTION INTRAMUSCULAR; INTRAVENOUS at 12:09

## 2023-01-15 RX ADMIN — METOCLOPRAMIDE 5 MG: 5 INJECTION, SOLUTION INTRAMUSCULAR; INTRAVENOUS at 08:42

## 2023-01-15 ASSESSMENT — ACTIVITIES OF DAILY LIVING (ADL)
ADLS_ACUITY_SCORE: 31

## 2023-01-15 NOTE — H&P
[facility]    History and Physical - ED Observation Service      Date of Admission:  1/14/2023    Assessment & Plan      Myriam Wylie is a 22 year old female admitted on 1/14/2023. She has a past medical history significant for type 1 diabetes, DKA, Cdiff, PID who presents with 4 days of nausea, vomiting and diarrhea.     ##. Abdominal Pain, Upper Quadrants  ##. Nausea, Vomiting, Diarrhea  Patient seen in ED 1/9/23 w/  5 days N/V, hyperglycemia. . Lactic acid 4.0. All her other labs, UA unremarkable. Treated with IVF, zofran, GI cocktail; lactic acid improved to 1.9. HR was down to 85 by discharge with Prilosec and Zofran. Since discharge, vomiting has worsened. Developed diarrhea with multiple episodes of loose stools. Reports ongoing upper quadrant abdominal pain that has been on going since she was last seen in ED 5 days ago. Reports lightheadedness. h/o PID 2022 with GC/Chlam; h/o CDiff in 2018 as well as GC/Chlam. Denies fever, chills, cough, congestion, SOB, headache, chest pain, vaginal discharge, recent antibiotic use. Last abx use per chart review 10/28/2022. States she works in a  and has sick contacts there. Admits to cannabis use about every other day. Otherwise no ETOH or tobacco use. In ED, -112, -139/69-91, RR 13-24, SaO2 % on RA, Temp 98.7  F . Initial CMP showed bicarb 17, AG 18, , glucose 101, ketones 0.4. Lactic acid 3.0 and down to 1.4 after IVF. Repeat BMP with bicarb 18, AG 15, glucose 103. Normal CBC, lipase, VBG. CT abdomen pelvis reports hepatomegaly, increased in size from prior CT 10/31/2022 - consider clinical correlation for hepatitis; cluster of prominent mesenteric nodes is nonspecific but can be seen in mesenteric lymphadenitis; small amount of pelvic free fluid may be physiologic. In ED patient was given 2L LR bolus, reglan 5mg IV x 1, zofran 4mg IV x 1, compazine 10mg IV x 1, morphine 4mg IV x 1,  protonix 40mg IV x 1.  Patient is being admitted for symptomatic management. Tenderness in upper quadrants. DDx includes AGE, Cdiff, cannabinoid hyperemesis, gastritis. Less likely PID (tenderness is upper), respiratory virus (no respiratory symptoms, no leukocytosis, no fever)  - MIVF with LR at 125ml/hr  - Phenergan, Regaln q6h alternating   - Zofran prn nausea  - Tylenol prn fever or pain  - Protonix 40mg IV BID  - ADAT to Carb Consistent diet  - Send UDS  - Send stool culture   - Send C-Diff  - Send Covid, Influenza A/B swab  - Send GC/Chlam PCR - urine  - Enteric isolation  - Repeat CBC, CMP, CRP, ESR, Mag, Phos, lactic acid in AM    ##. DM1: Last A1c here was 10.9 on 7/27/22. A1c at  10.4 on 10/20/22. Follows with Endocrine at . Current regimen includes: Basaglar 15 unit daily and Novolog 1 unit per 10 grams CHO; correction 1 unit per 50 over 150 mg/dL  - Basaglar 8units (1/2 regular dose) daily  - Novolog high resistance sliding scale q4h  - BG checks q4h  - Check HgbA1c  - Carbohydrate Consistent Diet  - Hypoglycemic protocol        Diet: Advance Diet as Tolerated: Clear Liquid Diet    DVT Prophylaxis: Ambulate every shift  Martines Catheter: Not present  Lines: None     Cardiac Monitoring: None  Code Status: Full Code      Clinically Significant Risk Factors Present on Admission            # Hypomagnesemia: Lowest Mg = 1.3 mg/dL in last 2 days, will replace as needed                    Disposition Plan      Expected Discharge Date: 01/15/2023                The patient's care was discussed with the Attending Physician, Dr. Johnston.    KATJA Pruitt   ED Observation Service   [facility]  Securely message with [employer] (more info)  Text page via [facility] Paging/Directory     ______________________________________________________________________    Chief Complaint   Nausea, vomiting, diarrhea and abdominal pain    History is obtained from the  patient    History of Present Illness   Myriam Wylie is a 22 year old female with a past medical history significant for type 1 diabetes, DKA, Cdiff, PID who presents with 4 days of nausea, vomiting and diarrhea. She also reports her vomiting has worsened over the past 2 days and starting this AM she has developed diarrhea with multiple episodes of loose stools. With her severe vomiting she reports feeling like she might pass out. She reports ongoing upper quadrant abdominal pain that has been on going since she was last seen in ED 5 days ago. Admits to lightheadedness. Admits to h/o PID last year with both gonorrhea and chlamydia; h/o CDiff in 2016. Per chart review had GC/Chlam + PID in 2018 as well as severe CDiff. Per  LMP about a month ago. Denies fever, chills, cough, congestion, SOB, headache, chest pain, vaginal discharge, recent antibiotic use. Last abx use per chart review was 10/28/2022. States she works in a  and has sick contacts there. Admits to cannabis use about every other day. Otherwise no ETOH or tobacco use.     Per chart review patient was seen in the ED on 1/9/23 with 5 days of nausea, vomiting and hyperglycemia. . Lactic acid 4.0. All her other labs, UA were unremarkable. She was treated with IVF, zofran, GI cocktail. lactic acid improved to 1.9 after IVF. HR was down to 85 by discharge. She was discharged with Prilosec and Zofran.     In the ED, -112, -139/69-91, RR 13-24, SaO2 % on RA, Temp 98.7  F . Initial CMP showed bicarb 17, AG 18, , glucose 101, ketones 0.4. Lactic acid 3.0 and down to 1.4 after IVF. Repeat BMP with bicarb 18, AG 15, glucose 103. Normal CBC, lipase, VBG. CT abdomen pelvis reports hepatomegaly, increased in size from prior CT 10/31/2022 - consider clinical correlation for hepatitis; cluster of prominent mesenteric nodes is nonspecific but can be seen in mesenteric lymphadenitis; small amount of pelvic free fluid may be  physiologic.. In the ED the patient was given 2L LR bolus, reglan 5mg IV x 1, zofran 4mg IV x 1, compazine 10mg IV x 1, morphine 4mg IV x 1, protonix 40mg IV x 1.  Patient is being admitted for symptomatic management.     Past Medical History    Past Medical History:   Diagnosis Date     C. difficile colitis      Diabetes type 1, uncontrolled      DKA (diabetic ketoacidosis) (H)      PID (acute pelvic inflammatory disease)        Past Surgical History   Past Surgical History:   Procedure Laterality Date     COLONOSCOPY N/A 2019    Procedure: COLONOSCOPY;  Surgeon: Jeremi Banks MD;  Location: [facility]      ESOPHAGOSCOPY, GASTROSCOPY, DUODENOSCOPY (EGD), COMBINED N/A 2019    Procedure: Upper endoscopy and colonoscopy with biopsy;  Surgeon: Jeremi Banks MD;  Location: [facility]        Prior to Admission Medications   Prior to Admission Medications   Prescriptions Last Dose Informant Patient Reported? Taking?   Glucagon 0.5 MG/0.1ML SOSY  Self No No   Sig: Inject 1 ampule Subcutaneous daily as needed   Prenatal Vit-Fe Fumarate-FA (PRENATAL MULTIVITAMIN W/IRON) 27-0.8 MG tablet  Self No No   Sig: Take 1 tablet by mouth daily   acetone, Urine, test STRP  Self No No   Si strip by In Vitro route as needed   blood glucose monitoring (ACCU-CHEK FASTCLIX) lancets  Self No No   Sig: Use to test blood sugar 6 times daily or as directed.   blood glucose monitoring (ACCU-CHEK HENRI SMARTVIEW) meter device kit  Self No No   Sig: Use to test blood sugar 6 times daily or as directed.   blood glucose monitoring (ACCU-CHEK SMARTVIEW) test strip  Self No No   Sig: Use to test blood sugar 6 times daily or as directed.   fluticasone (FLONASE) 50 MCG/ACT nasal spray   Yes No   Sig: Spray 2 sprays into both nostrils daily SHAKE LIQUID AND USE   insulin aspart (NOVOLOG PEN) 100 UNIT/ML pen  Self No No   Sig: Inject 1-5 Units Subcutaneous At Bedtime   insulin aspart (NOVOLOG PEN) 100 UNIT/ML pen   No No   Sig:  Inject 1-5 Units Subcutaneous 4 times daily (with meals and nightly)   insulin glargine (BASAGLAR KWIKPEN) 100 UNIT/ML pen  Self No No   Sig: Inject 14 Units Subcutaneous daily At noon   Patient taking differently: Inject 15 Units Subcutaneous daily At noon   insulin pen needle (BD HENRI U/F) 32G X 4 MM  Self No No   Sig: Use 6 pen needles daily or as directed.   omeprazole (PRILOSEC) 20 MG DR capsule   No No   Sig: Take 1 capsule (20 mg) by mouth daily for 14 days   ondansetron (ZOFRAN ODT) 4 MG ODT tab   Yes Yes   Sig: Take 4 mg by mouth every 6 hours as needed   pyridOXINE (VITAMIN B6) 25 MG tablet  Self No No   Sig: Take 1 tablet (25 mg) by mouth 3 times daily      Facility-Administered Medications: None        Review of Systems    All other ROS negative except those mentioned in above note.      Social History   I have reviewed this patient's social history and updated it with pertinent information if needed.  Social History     Tobacco Use     Smoking status: Never     Smokeless tobacco: Never   Substance Use Topics     Alcohol use: Yes     Drug use: Yes     Types: Marijuana       Family History         Allergies   No Known Allergies     Physical Exam   Vital Signs: Temp: 98.7  F (37.1  C) Temp src: Oral BP: 131/69 Pulse: 112   Resp: 13 SpO2: 100 % O2 Device: None (Room air)    Weight: 115 lbs 0 oz    Constitutional: awake, alert, cooperative, no apparent distress, and appears stated age  Eyes: Lids and lashes normal, pupils equal, round and reactive to light, extra ocular muscles intact, sclera clear, conjunctiva normal  ENT: Normocephalic, without obvious abnormality, atraumatic, sinuses nontender on palpation, external ears without lesions, oral pharynx with moist mucous membranes, tonsils without erythema or exudates, gums normal and good dentition.  Hematologic / Lymphatic: no cervical lymphadenopathy  Respiratory: No increased work of breathing, good air exchange, clear to auscultation bilaterally, no  crackles or wheezing  Cardiovascular: Normal apical impulse, regular rate and rhythm, normal S1 and S2, no S3 or S4, and no murmur noted  GI: No scars, normal bowel sounds, soft, non-distended, upper quadrant abdominal tenderness, no masses palpated, no hepatosplenomegally  Skin: no bruising or bleeding  Musculoskeletal: There is no redness, warmth, or swelling of the joints.  Full range of motion noted.  Motor strength is 5 out of 5 all extremities bilaterally.  Tone is normal.  Neurologic: Awake, alert, oriented to name, place and time.  Cranial nerves II-XII are grossly intact.  Motor is 5 out of 5 bilaterally.  Cerebellar finger to nose, heel to shin intact.  Sensory is intact.  Babinski down going, Romberg negative, and gait is normal.  Neuropsychiatric: General: normal, calm and normal eye contact     Medical Decision Making       **CLEAR ALL SELECTIONS**      Data     I have personally reviewed the following data over the past 24 hrs:    7.1  \   10.4 (L)   / 241     139 106 5.0 (L) /  126 (H)   4.3 18 (L) 0.51 \       ALT: 17 AST: 26 AP: 167 (H) TBILI: 0.4   ALB: 4.3 TOT PROTEIN: 8.1 LIPASE: 24       Procal: N/A CRP: 75.60 (H) Lactic Acid: 1.4         Imaging results reviewed over the past 24 hrs:   Recent Results (from the past 24 hour(s))   CT Abdomen Pelvis w Contrast    Narrative    EXAMINATION: CT ABDOMEN PELVIS W CONTRAST, 1/14/2023 3:48 PM    INDICATION: Diffuse upper quadrant abdominal pain.    COMPARISON STUDY: 10/31/2022    TECHNIQUE: CT scan of the abdomen and pelvis was performed on  multidetector CT scanner using volumetric acquisition technique and  images were reconstructed in multiple planes with variable thickness  and reviewed on dedicated workstations.     CONTRAST: iopamidol (ISOVUE-370) solution 70 mL injected IV with oral  contrast    CT scan radiation dose is optimized to minimum requisite dose using  automated dose modulation techniques.    FINDINGS:    Lower thorax: Normal.    Liver:  No mass. Hepatomegaly with 19.5 cm in craniocaudal dimension,  enlarged from prior on 10/31/2022 when it measured 17.7 cm.    Biliary System: Normal gallbladder. No extrahepatic biliary ductal  dilation.    Pancreas: No mass or pancreatic ductal dilation.    Adrenal glands: No mass or nodules    Spleen: Normal.    Kidneys: No suspicious mass, obstructing calculus or hydronephrosis.    Gastrointestinal tract :Normal appendix. Normal caliber small bowel.    Mesentery/peritoneum/retroperitoneum: No mass. No free air.  Small  amount of free pelvic fluid.    Lymph nodes: Prominent mesenteric lymph nodes.    Vasculature: Patent major abdominal vasculature.    Pelvis: Urinary bladder is normal. Uterus and adnexa within normal  limits.    Osseous structures: No aggressive or acute osseous lesion.      Soft tissues: Within normal limits.      Impression    IMPRESSION:   1. Hepatomegaly, increased in size from prior CT 10/31/2022. Consider  clinical correlation for hepatitis.  2. Cluster of prominent mesenteric nodes is nonspecific but can be  seen in mesenteric lymphadenitis.   3. Small amount of pelvic free fluid may be physiologic.    I have personally reviewed the examination and initial interpretation  and I agree with the findings.    LUCI CLARK MD         SYSTEM ID:  [id number]   US Abdomen Limited (RUQ)    Narrative    EXAMINATION: Limited Abdominal Ultrasound, 1/14/2023 5:50 PM     COMPARISON: Same day CT    HISTORY:    New CT findings showing increase in liver size    FINDINGS:   Fluid: No evidence of ascites or pleural effusions.    Liver: The liver demonstrates normal homogeneous echotexture,  measuring 18.2 cm in craniocaudal dimension. There is no focal mass.     Gallbladder: There is no wall thickening, pericholecystic fluid,  positive sonographic Pham's sign or evidence for cholelithiasis.    Bile Ducts: Both the intra- and extrahepatic biliary system are of  normal caliber.  The common bile duct measures  4 mm in diameter.    Pancreas: Visualized portions of the head and body of the pancreas are  unremarkable.     Kidney: The right kidney measures 9.7 cm long. There is no  hydronephrosis or hydroureter, no shadowing renal calculi, cystic  lesion or mass.       Impression    IMPRESSION:   1.  Hepatomegaly without focal hepatic lesions.  2.  Otherwise unremarkable right upper quadrant ultrasound.    I have personally reviewed the examination and initial interpretation  and I agree with the findings.    LUCI CLARK MD         SYSTEM ID:  [id number]           Patient was seen and evaluated by ER Staff during the OBS Unit stay (see separate note).  The medical decision making and plan of care was discussed with the OBS Care Team and was supervised by ER Staff.  The documentation above accurately reflects the patient's initial evaluation, care and disposition under my supervision in the OBS Unit.    Wang Johnston MD, FACEP  [facility] Staff Emergency Physician

## 2023-01-15 NOTE — DISCHARGE SUMMARY
[facility]  ED Observation Discharge Summary      Date of Admission:  1/14/2023  Date of Discharge:  1/15/2023  Discharging Provider: Isamar Brennan PA-C  Discharge Service: ED Observation Service    Discharge Diagnoses   C. Diff infection  Norovirus infection  DM I    Follow-ups Needed After Discharge   Follow up with PCP in 1 week    Unresulted Labs Ordered in the Past 30 Days of this Admission     No orders found for last 31 day(s).        Discharge Disposition   Discharged to home  Condition at discharge: Stable    Hospital Course   Myriam Wylie is a 22 year old female admitted on 1/14/2023. She has a past medical history significant for type 1 diabetes, DKA, Cdiff, PID who presents with 4 days of nausea, vomiting and diarrhea.      #Nausea, vomiting, diarrhea, abdominal pain  #C. Diff infection  #Norovirus infection  Patient seen in ED 1/9/23 w/ 5 days N/V, hyperglycemia. . Lactic acid 4.0. All her other labs, UA unremarkable. Treated with IVF, zofran, GI cocktail; lactic acid improved to 1.9. HR was down to 85 by discharge with Prilosec and Zofran. Since discharge, vomiting has worsened. Developed diarrhea with multiple episodes of loose stools. Reports ongoing upper quadrant abdominal pain that has been on going since she was last seen in ED 5 days ago. Reports lightheadedness. H/o PID 2022 with GC/Chlam; h/o CDiff in 2018 as well as GC/Chlam. Denies fever, chills, cough, congestion, SOB, headache, chest pain, vaginal discharge, recent antibiotic use. Last abx use per chart review 10/28/2022. States she works in a  and has sick contacts there. Admits to cannabis use about every other day. Otherwise no ETOH or tobacco use. In ED, -112, -139/69-91, RR 13-24, SaO2 % on RA, Temp 98.7  F . Initial CMP showed bicarb 17, AG 18, , glucose 101, ketones 0.4. Lactic acid 3.0 and down to 1.4 after IVF. Repeat BMP with bicarb 18,  AG 15, glucose 103. Normal CBC, lipase, VBG. CT abdomen pelvis reports hepatomegaly, increased in size from prior CT 10/31/2022 - consider clinical correlation for hepatitis; cluster of prominent mesenteric nodes is nonspecific but can be seen in mesenteric lymphadenitis; small amount of pelvic free fluid may be physiologic. In ED patient was given 2L LR bolus, reglan 5mg IV x 1, zofran 4mg IV x 1, compazine 10mg IV x 1, morphine 4mg IV x 1, protonix 40mg IV x 1.  Patient was admitted for symptomatic management. She tested positive for C. Diff GDH antigen and C. Diff toxin by immunoassay and also positive for Norovirus. She had improvement of symptoms with IV fluids and antiemetics, and is tolerating oral intake. Diarrhea has also improved. Stable for discharge home on oral Vancomycin. Referred to PCP, as patient states she does not have one.     #DM1: Last A1c here was 10.9 on 7/27/22. A1c at  10.4 on 10/20/22. Follows with Endocrine at . Current regimen includes: Basaglar 15 unit daily and Novolog 1 unit per 10 grams CHO; correction 1 unit per 50 over 150 mg/dL    Consultations This Hospital Stay   NURSING TO CONSULT FOR VASCULAR ACCESS CARE IP CONSULT    Code Status   Full Code    Time Spent on this Encounter   I, Isamar Brennan PA-C, personally saw the patient today and spent greater than 30 minutes discharging this patient.     Isamar Brennan PA-C  [facility] [address] [facility]  [address] [place] [address]  Phone: [phone]  Fax: [phone]  ______________________________________________________________________    Physical Exam   Vital Signs: Temp: 98.4  F (36.9  C) Temp src: Oral BP: 128/86 Pulse: 85   Resp: 14 SpO2: 100 % O2 Device: None (Room air)    Weight: 121 lbs 14.63 oz  Exam:  Constitutional: alert, no distress, and cooperative  Head: normocephalic, atraumatic  Neck: no asymmetry, masses, or scars  ENT: throat normal without erythema or  exudate  Cardiovascular: RRR  Respiratory: diminished, respirations unlabored  Gastrointestinal: (+) BS, soft, minimal upper abdominal tenderness  Musculoskeletal: normal muscle tone, no pitting edema  Skin: no suspicious lesions or rashes  Neurologic: oriented x 3, moves all extremities, no slurred speech  Psychiatric: normal affect and mood       Primary Care Physician   Physician No Ref-Primary    Discharge Orders      Primary Care Referral      Reason for your hospital stay    You were hospitalized for nausea, vomiting, abdominal pain, and diarrhea. You tested positive for both C. Diff and Norovirus. We have opted to treat the C. Diff with oral Vancomycin. Norovirus is a viral infection that does not require antibiotics, only supportive care. Drink plenty of fluids to stay hydrated. You have Zofran to use as needed for nausea.     Activity    Your activity upon discharge: activity as tolerated     Adult [facility] Follow-up and recommended labs and tests    Follow up with primary care provider, Physician No Ref-Primary, within 7 days for hospital follow- up.  No follow up labs or test are needed.      Appointments on [place] and/or [facility] (with [facility] or [facility] provider or service). Call [phone] if you haven't heard regarding these appointments within 7 days of discharge.     When to contact your care team    Contact your care team for intractable nausea/vomiting, severe abdominal pain, persistent fevers, blood in the stool, black sticky tarry stool, or any other new or worsening symptoms.     Diet    Follow this diet upon discharge: Orders Placed This Encounter      Advance Diet as Tolerated: Regular Diet Adult; Moderate Consistent Carb (60 g CHO per Meal) Diet       Significant Results and Procedures   Results for orders placed or performed during the hospital encounter of 01/14/23   CT Abdomen Pelvis w Contrast    Narrative    EXAMINATION: CT ABDOMEN PELVIS W CONTRAST, 1/14/2023 3:48  PM    INDICATION: Diffuse upper quadrant abdominal pain.    COMPARISON STUDY: 10/31/2022    TECHNIQUE: CT scan of the abdomen and pelvis was performed on  multidetector CT scanner using volumetric acquisition technique and  images were reconstructed in multiple planes with variable thickness  and reviewed on dedicated workstations.     CONTRAST: iopamidol (ISOVUE-370) solution 70 mL injected IV with oral  contrast    CT scan radiation dose is optimized to minimum requisite dose using  automated dose modulation techniques.    FINDINGS:    Lower thorax: Normal.    Liver: No mass. Hepatomegaly with 19.5 cm in craniocaudal dimension,  enlarged from prior on 10/31/2022 when it measured 17.7 cm.    Biliary System: Normal gallbladder. No extrahepatic biliary ductal  dilation.    Pancreas: No mass or pancreatic ductal dilation.    Adrenal glands: No mass or nodules    Spleen: Normal.    Kidneys: No suspicious mass, obstructing calculus or hydronephrosis.    Gastrointestinal tract :Normal appendix. Normal caliber small bowel.    Mesentery/peritoneum/retroperitoneum: No mass. No free air.  Small  amount of free pelvic fluid.    Lymph nodes: Prominent mesenteric lymph nodes.    Vasculature: Patent major abdominal vasculature.    Pelvis: Urinary bladder is normal. Uterus and adnexa within normal  limits.    Osseous structures: No aggressive or acute osseous lesion.      Soft tissues: Within normal limits.      Impression    IMPRESSION:   1. Hepatomegaly, increased in size from prior CT 10/31/2022. Consider  clinical correlation for hepatitis.  2. Cluster of prominent mesenteric nodes is nonspecific but can be  seen in mesenteric lymphadenitis.   3. Small amount of pelvic free fluid may be physiologic.    I have personally reviewed the examination and initial interpretation  and I agree with the findings.    LUCI CLARK MD         SYSTEM ID:  [id number]   US Abdomen Limited (RUQ)    Narrative    EXAMINATION: Limited Abdominal  Ultrasound, 1/14/2023 5:50 PM     COMPARISON: Same day CT    HISTORY:    New CT findings showing increase in liver size    FINDINGS:   Fluid: No evidence of ascites or pleural effusions.    Liver: The liver demonstrates normal homogeneous echotexture,  measuring 18.2 cm in craniocaudal dimension. There is no focal mass.     Gallbladder: There is no wall thickening, pericholecystic fluid,  positive sonographic Pham's sign or evidence for cholelithiasis.    Bile Ducts: Both the intra- and extrahepatic biliary system are of  normal caliber.  The common bile duct measures 4 mm in diameter.    Pancreas: Visualized portions of the head and body of the pancreas are  unremarkable.     Kidney: The right kidney measures 9.7 cm long. There is no  hydronephrosis or hydroureter, no shadowing renal calculi, cystic  lesion or mass.       Impression    IMPRESSION:   1.  Hepatomegaly without focal hepatic lesions.  2.  Otherwise unremarkable right upper quadrant ultrasound.    I have personally reviewed the examination and initial interpretation  and I agree with the findings.    LUCI CLARK MD         SYSTEM ID:  [id number]       Discharge Medications   Current Discharge Medication List      START taking these medications    Details   vancomycin (VANCOCIN) 125 MG capsule Take 1 capsule (125 mg) by mouth 4 times daily for 10 days  Qty: 40 capsule, Refills: 0    Associated Diagnoses: C. difficile colitis         CONTINUE these medications which have NOT CHANGED    Details   ondansetron (ZOFRAN ODT) 4 MG ODT tab Take 4 mg by mouth every 6 hours as needed      acetone, Urine, test STRP 1 strip by In Vitro route as needed  Qty: 50 each, Refills: 1      blood glucose monitoring (ACCU-CHEK FASTCLIX) lancets Use to test blood sugar 6 times daily or as directed.  Qty: 2 Box, Refills: 6    Associated Diagnoses: Type 1 diabetes mellitus with hyperglycemia (H)      blood glucose monitoring (ACCU-CHEK HENRI SMARTVIEW) meter device kit Use to  test blood sugar 6 times daily or as directed.  Qty: 2 kit, Refills: 6    Comments: 1 kit home and 1 kit school  Associated Diagnoses: Type 1 diabetes mellitus with hyperglycemia (H)      blood glucose monitoring (ACCU-CHEK SMARTVIEW) test strip Use to test blood sugar 6 times daily or as directed.  Qty: 200 each, Refills: 6    Associated Diagnoses: Type 1 diabetes mellitus with hyperglycemia (H)      fluticasone (FLONASE) 50 MCG/ACT nasal spray Spray 2 sprays into both nostrils daily SHAKE LIQUID AND USE      Glucagon 0.5 MG/0.1ML SOSY Inject 1 ampule Subcutaneous daily as needed  Qty: 1 mL, Refills: 0    Associated Diagnoses: Type 1 diabetes mellitus with hyperglycemia (H)      !! insulin aspart (NOVOLOG PEN) 100 UNIT/ML pen Inject 1-5 Units Subcutaneous 4 times daily (with meals and nightly)  Qty: 15 mL, Refills: 1    Associated Diagnoses: Type 1 diabetes mellitus with hyperglycemia (H)      !! insulin aspart (NOVOLOG PEN) 100 UNIT/ML pen Inject 1-5 Units Subcutaneous At Bedtime  Qty: 15 mL, Refills: 0    Comments: ISF 50  1 per 50 > 120 - before bed  For  - 169 give 1 unit.   For  - 219 give 2 units.   For  - 269 give 3 units.   For  - 319 give 4 units.   For BG = or > 320 give 5 units.        Associated Diagnoses: Type 1 diabetes mellitus with hyperglycemia (H)      insulin glargine (BASAGLAR KWIKPEN) 100 UNIT/ML pen Inject 14 Units Subcutaneous daily At noon  Qty: 15 mL, Refills: 0    Comments: If Basaglar is not covered by insurance, may substitute Lantus at same dose and frequency.    Associated Diagnoses: Type 1 diabetes mellitus with hyperglycemia (H)      insulin pen needle (BD HENRI U/F) 32G X 4 MM Use 6 pen needles daily or as directed.  Qty: 200 each, Refills: 6    Associated Diagnoses: Type 1 diabetes mellitus with hyperglycemia (H)      omeprazole (PRILOSEC) 20 MG DR capsule Take 1 capsule (20 mg) by mouth daily for 14 days  Qty: 14 capsule, Refills: 0      Prenatal Vit-Fe  Fumarate-FA (PRENATAL MULTIVITAMIN W/IRON) 27-0.8 MG tablet Take 1 tablet by mouth daily  Qty: 90 tablet, Refills: 3    Associated Diagnoses: Hyperemesis gravidarum      pyridOXINE (VITAMIN B6) 25 MG tablet Take 1 tablet (25 mg) by mouth 3 times daily  Qty: 90 tablet, Refills: 0    Associated Diagnoses: Hyperemesis gravidarum       !! - Potential duplicate medications found. Please discuss with provider.        Allergies   No Known Allergies           This patient was discussed with the Care Team in the OBS Unit.  The patient's chart was reviewed and the patient was also seen and evaluated by me.  The plan of care was discussed and reviewed with the Care Team.  The above documentation reflects the evaluation, medical decision making and plan under my supervision.    Wang Johnston MD, FACEP  [facility] Staff Emergency Physician

## 2023-01-15 NOTE — PLAN OF CARE
Goal Outcome Evaluation:    - Nausea/vomiting (diarrhea if present) improved. Progressing   - Tolerating oral intake to maintain hydration. Progressing, ADAT as tolerated, currently tolerating liquid diet.    - Vital signs normal or at patient baseline and orthostatic vitals are normal and patient not lightheaded with standing. Met   - Diagnostic tests and consults completed and resulted if ordered. Not met.  - Adequate pain control on oral analgesia. Met    - Tolerating oral antibiotics if ordered. N/A  - Safe disposition plan has been identified. Not met

## 2023-01-15 NOTE — PROGRESS NOTES
Discharge instruction reviewed.  Patient verbalized understanding. PIV removed, patient ambulated to the main lobby. Pt will take a lyft home.Patient discharged

## 2023-01-15 NOTE — PLAN OF CARE
"Goal Outcome Evaluation:    - Nausea/vomiting (diarrhea if present) improved. Improving  - Tolerating oral intake to maintain hydration. Met   - Vital signs normal or at patient baseline and orthostatic vitals are normal and patient not lightheaded with standing. Met   - Diagnostic tests and consults completed and resulted if ordered. Not met.  - Adequate pain control on oral analgesia. Met    - Tolerating oral antibiotics if ordered. N/A  - Safe disposition plan has been identified. Not met     /61 (BP Location: Right arm, Patient Position: Supine, Cuff Size: Adult Regular)   Pulse 95   Temp 98.2  F (36.8  C) (Oral)   Resp 16   Ht 1.499 m (4' 11\")   Wt 55.3 kg (121 lb 14.6 oz)   LMP 12/10/2022 (Exact Date)   SpO2 100%   BMI 24.62 kg/m      "

## 2023-01-15 NOTE — PROGRESS NOTES
Pt's BG was 329 @ 2200 and dropped to 112 @ 0200. Provider order q2h BGs and pt BG was found to be at 67 @ 0400. Pt A/O x 4 and asymptomatic with good ability swallow. Given 4 oz of Apple Juice. Notified the provider and took BG q15min until above 100.

## 2023-01-16 ENCOUNTER — PATIENT OUTREACH (OUTPATIENT)
Dept: CARE COORDINATION | Facility: CLINIC | Age: 23
End: 2023-01-16

## 2023-01-16 LAB
ATRIAL RATE - MUSE: 89 BPM
DIASTOLIC BLOOD PRESSURE - MUSE: NORMAL MMHG
INTERPRETATION ECG - MUSE: NORMAL
P AXIS - MUSE: 44 DEGREES
PR INTERVAL - MUSE: 142 MS
QRS DURATION - MUSE: 66 MS
QT - MUSE: 394 MS
QTC - MUSE: 479 MS
R AXIS - MUSE: 44 DEGREES
SYSTOLIC BLOOD PRESSURE - MUSE: NORMAL MMHG
T AXIS - MUSE: 40 DEGREES
VENTRICULAR RATE- MUSE: 89 BPM

## 2023-01-16 NOTE — PROGRESS NOTES
Clinic Care Coordination Contact  [facility]: Post-Discharge Note  SITUATION                                                      Admission:    Admission Date: 01/14/23   Reason for Admission: C. Diff infection  Norovirus infection  DM I  Discharge:   Discharge Date: 01/15/23  Discharge Diagnosis: C. Diff infection  Norovirus infection  DM I    BACKGROUND                                                      Per hospital discharge summary and inpatient provider notes:    Myriam Wylie is a 22 year old female admitted on 1/14/2023. She has a past medical history significant for type 1 diabetes, DKA, Cdiff, PID who presents with 4 days of nausea, vomiting and diarrhea.     ASSESSMENT           Discharge Assessment  How are you doing now that you are home?: doing well  How are your symptoms? (Red Flag symptoms escalate to triage hotline per guidelines): Improved  Do you feel your condition is stable enough to be safe at home until your provider visit?: Yes  Does the patient have their discharge instructions? : Yes  Does the patient have questions regarding their discharge instructions? : No  Were you started on any new medications or were there changes to any of your previous medications? : Yes  Does the patient have all of their medications?: Yes  Do you have questions regarding any of your medications? : No  Do you have all of your needed medical supplies or equipment (DME)?  (i.e. oxygen tank, CPAP, cane, etc.): Yes  Discharge follow-up appointment scheduled within 14 calendar days? : No  Is patient agreeable to assistance with scheduling? : No (Pt recieved a call regarding scheduling appt with PCP- waiting confirming)    Post-op ([facility] Only)  If the patient had a surgery or procedure, do they have any questions for a nurse?: No         PLAN                                                      Outpatient Plan:     No future appointments.    Follow up with PCP in 1 week    For any urgent concerns, please  contact our 24 hour nurse triage line: [phone] ([phone])         JR Weinstein  [phone]

## 2023-02-01 ENCOUNTER — HOSPITAL ENCOUNTER (EMERGENCY)
Facility: CLINIC | Age: 23
Discharge: HOME | End: 2023-02-01
Attending: EMERGENCY MEDICINE | Admitting: EMERGENCY MEDICINE
Payer: COMMERCIAL

## 2023-02-01 VITALS
BODY MASS INDEX: 23.79 KG/M2 | DIASTOLIC BLOOD PRESSURE: 70 MMHG | HEIGHT: 59 IN | TEMPERATURE: 97.8 F | HEART RATE: 95 BPM | OXYGEN SATURATION: 99 % | WEIGHT: 118 LBS | SYSTOLIC BLOOD PRESSURE: 123 MMHG | RESPIRATION RATE: 15 BRPM

## 2023-02-01 DIAGNOSIS — A04.72 C. DIFFICILE DIARRHEA: ICD-10-CM

## 2023-02-01 LAB
ALBUMIN SERPL BCG-MCNC: 4.1 G/DL (ref 3.5–5.2)
ALBUMIN UR-MCNC: NEGATIVE MG/DL
ALP SERPL-CCNC: 155 U/L (ref 35–104)
ALT SERPL W P-5'-P-CCNC: 16 U/L (ref 10–35)
ANION GAP SERPL CALCULATED.3IONS-SCNC: 14 MMOL/L (ref 7–15)
APPEARANCE UR: CLEAR
AST SERPL W P-5'-P-CCNC: 18 U/L (ref 10–35)
B-OH-BUTYR SERPL-MCNC: 0.4 MMOL/L
BASOPHILS # BLD AUTO: 0 10E3/UL (ref 0–0.2)
BASOPHILS NFR BLD AUTO: 1 %
BILIRUB SERPL-MCNC: 0.3 MG/DL
BILIRUB UR QL STRIP: NEGATIVE
BUN SERPL-MCNC: 10.4 MG/DL (ref 6–20)
CALCIUM SERPL-MCNC: 8.8 MG/DL (ref 8.6–10)
CHLORIDE SERPL-SCNC: 103 MMOL/L (ref 98–107)
COLOR UR AUTO: ABNORMAL
CREAT SERPL-MCNC: 0.66 MG/DL (ref 0.51–0.95)
DEPRECATED HCO3 PLAS-SCNC: 21 MMOL/L (ref 22–29)
EOSINOPHIL # BLD AUTO: 0.1 10E3/UL (ref 0–0.7)
EOSINOPHIL NFR BLD AUTO: 1 %
ERYTHROCYTE [DISTWIDTH] IN BLOOD BY AUTOMATED COUNT: 15.4 % (ref 10–15)
GFR SERPL CREATININE-BSD FRML MDRD: >90 ML/MIN/1.73M2
GLUCOSE BLDC GLUCOMTR-MCNC: 375 MG/DL (ref 70–99)
GLUCOSE SERPL-MCNC: 312 MG/DL (ref 70–99)
GLUCOSE UR STRIP-MCNC: >=1000 MG/DL
HCG UR QL: NEGATIVE
HCT VFR BLD AUTO: 39.5 % (ref 35–47)
HGB BLD-MCNC: 11.8 G/DL (ref 11.7–15.7)
HGB UR QL STRIP: NEGATIVE
HOLD SPECIMEN: NORMAL
IMM GRANULOCYTES # BLD: 0 10E3/UL
IMM GRANULOCYTES NFR BLD: 0 %
KETONES UR STRIP-MCNC: ABNORMAL MG/DL
LEUKOCYTE ESTERASE UR QL STRIP: NEGATIVE
LYMPHOCYTES # BLD AUTO: 1.9 10E3/UL (ref 0.8–5.3)
LYMPHOCYTES NFR BLD AUTO: 52 %
MCH RBC QN AUTO: 25.9 PG (ref 26.5–33)
MCHC RBC AUTO-ENTMCNC: 29.9 G/DL (ref 31.5–36.5)
MCV RBC AUTO: 87 FL (ref 78–100)
MONOCYTES # BLD AUTO: 0.2 10E3/UL (ref 0–1.3)
MONOCYTES NFR BLD AUTO: 5 %
NEUTROPHILS # BLD AUTO: 1.5 10E3/UL (ref 1.6–8.3)
NEUTROPHILS NFR BLD AUTO: 41 %
NITRATE UR QL: NEGATIVE
NRBC # BLD AUTO: 0 10E3/UL
NRBC BLD AUTO-RTO: 0 /100
PH UR STRIP: 6 [PH] (ref 5–7)
PLATELET # BLD AUTO: 256 10E3/UL (ref 150–450)
POTASSIUM SERPL-SCNC: 4.2 MMOL/L (ref 3.4–5.3)
PROT SERPL-MCNC: 7.2 G/DL (ref 6.4–8.3)
RBC # BLD AUTO: 4.56 10E6/UL (ref 3.8–5.2)
RBC URINE: 2 /HPF
SODIUM SERPL-SCNC: 138 MMOL/L (ref 136–145)
SP GR UR STRIP: 1.03 (ref 1–1.03)
SQUAMOUS EPITHELIAL: 8 /HPF
UROBILINOGEN UR STRIP-MCNC: NORMAL MG/DL
WBC # BLD AUTO: 3.6 10E3/UL (ref 4–11)
WBC URINE: 2 /HPF

## 2023-02-01 PROCEDURE — 36415 COLL VENOUS BLD VENIPUNCTURE: CPT | Performed by: EMERGENCY MEDICINE

## 2023-02-01 PROCEDURE — 80053 COMPREHEN METABOLIC PANEL: CPT | Performed by: EMERGENCY MEDICINE

## 2023-02-01 PROCEDURE — 96360 HYDRATION IV INFUSION INIT: CPT | Performed by: EMERGENCY MEDICINE

## 2023-02-01 PROCEDURE — 85025 COMPLETE CBC W/AUTO DIFF WBC: CPT | Performed by: EMERGENCY MEDICINE

## 2023-02-01 PROCEDURE — 82010 KETONE BODYS QUAN: CPT | Performed by: EMERGENCY MEDICINE

## 2023-02-01 PROCEDURE — 82962 GLUCOSE BLOOD TEST: CPT

## 2023-02-01 PROCEDURE — 99284 EMERGENCY DEPT VISIT MOD MDM: CPT | Mod: 25 | Performed by: EMERGENCY MEDICINE

## 2023-02-01 PROCEDURE — 81001 URINALYSIS AUTO W/SCOPE: CPT | Performed by: EMERGENCY MEDICINE

## 2023-02-01 PROCEDURE — 96361 HYDRATE IV INFUSION ADD-ON: CPT | Performed by: EMERGENCY MEDICINE

## 2023-02-01 PROCEDURE — 99284 EMERGENCY DEPT VISIT MOD MDM: CPT | Performed by: EMERGENCY MEDICINE

## 2023-02-01 PROCEDURE — 258N000003 HC RX IP 258 OP 636: Performed by: EMERGENCY MEDICINE

## 2023-02-01 PROCEDURE — 81025 URINE PREGNANCY TEST: CPT | Performed by: EMERGENCY MEDICINE

## 2023-02-01 PROCEDURE — 250N000013 HC RX MED GY IP 250 OP 250 PS 637: Performed by: EMERGENCY MEDICINE

## 2023-02-01 RX ORDER — VANCOMYCIN HYDROCHLORIDE 125 MG/1
125 CAPSULE ORAL 4 TIMES DAILY
Qty: 56 CAPSULE | Refills: 0 | Status: SHIPPED | OUTPATIENT
Start: 2023-02-01 | End: 2023-02-15

## 2023-02-01 RX ORDER — SODIUM CHLORIDE 9 MG/ML
INJECTION, SOLUTION INTRAVENOUS CONTINUOUS
Status: DISCONTINUED | OUTPATIENT
Start: 2023-02-01 | End: 2023-02-01 | Stop reason: HOSPADM

## 2023-02-01 RX ORDER — ACETAMINOPHEN 325 MG/1
975 TABLET ORAL ONCE
Status: COMPLETED | OUTPATIENT
Start: 2023-02-01 | End: 2023-02-01

## 2023-02-01 RX ADMIN — SODIUM CHLORIDE: 9 INJECTION, SOLUTION INTRAVENOUS at 14:09

## 2023-02-01 RX ADMIN — SODIUM CHLORIDE 1000 ML: 9 INJECTION, SOLUTION INTRAVENOUS at 12:27

## 2023-02-01 RX ADMIN — ACETAMINOPHEN 975 MG: 325 TABLET ORAL at 12:50

## 2023-02-01 ASSESSMENT — ACTIVITIES OF DAILY LIVING (ADL)
ADLS_ACUITY_SCORE: 33
ADLS_ACUITY_SCORE: 35

## 2023-02-01 NOTE — LETTER
February 1, 2023      To Whom It May Concern:      Myriam Wylie was seen in our Emergency Department today, 02/01/23.  I expect her condition to improve over the next several days.  She may return to work when improved.    Sincerely,        Sourav Thompson, DO

## 2023-02-01 NOTE — ED TRIAGE NOTES
ambulatory from home with concerns for cdif.  Recently discharged 2 weeks ago with cdif, missed 2 days of 10 day vanco course.  Having 6+ episodes of diarrhea since Friday.   + cramping/N/V.  Vitals stable     Triage Assessment     Row Name 02/01/23 0852       Triage Assessment (Adult)    Airway WDL WDL       Respiratory WDL    Respiratory WDL WDL       Skin Circulation/Temperature WDL    Skin Circulation/Temperature WDL WDL       Cardiac WDL    Cardiac WDL WDL       Peripheral/Neurovascular WDL    Peripheral Neurovascular WDL WDL       Cognitive/Neuro/Behavioral WDL    Cognitive/Neuro/Behavioral WDL WDL

## 2023-02-01 NOTE — ED PROVIDER NOTES
ED Provider Note  [facility]      History     Chief Complaint   Patient presents with     Diarrhea     HPI  Myriam Wylie is a 22 year old female with history of DMI w/ prior DKA and recent admission to ED Obs here 1/14-15 for symptomatic management of Norovirus infection and C.diff colitis now presenting to the ED with ongoing diarrhea. Patient was discharged on 10 day course of Vancomycin 125 mg QID to be completed 1/25.  Patient states she took the first 5 days course and her symptoms resolved.  Patient states she was unable to sleep the rest of the course of medication.  She states that over the past 3 days symptoms have returned and she has been having frequent diarrhea.  She also notes mid abdominal cramping.  She states she has been able to drink fluids but has been eating less.  She states her blood sugars have been somewhat higher since this started.  No other symptoms noted.    Past Medical History  Past Medical History:   Diagnosis Date     C. difficile colitis      Diabetes type 1, uncontrolled      DKA (diabetic ketoacidosis) (H)      PID (acute pelvic inflammatory disease)      Past Surgical History:   Procedure Laterality Date     COLONOSCOPY N/A 9/18/2019    Procedure: COLONOSCOPY;  Surgeon: Jeremi Banks MD;  Location: [facility]      ESOPHAGOSCOPY, GASTROSCOPY, DUODENOSCOPY (EGD), COMBINED N/A 9/18/2019    Procedure: Upper endoscopy and colonoscopy with biopsy;  Surgeon: Jeremi Banks MD;  Location: [facility]      fidaxomicin (DIFICID) 200 MG tablet  vancomycin (VANCOCIN) 125 MG capsule  acetone, Urine, test STRP  blood glucose monitoring (ACCU-CHEK FASTCLIX) lancets  blood glucose monitoring (ACCU-CHEK HENRI SMARTVIEW) meter device kit  blood glucose monitoring (ACCU-CHEK SMARTVIEW) test strip  fluticasone (FLONASE) 50 MCG/ACT nasal spray  Glucagon 0.5 MG/0.1ML SOSY  insulin aspart (NOVOLOG PEN) 100 UNIT/ML pen  insulin aspart (NOVOLOG PEN) 100 UNIT/ML  pen  insulin glargine (BASAGLAR KWIKPEN) 100 UNIT/ML pen  insulin pen needle (BD HENRI U/F) 32G X 4 MM  ondansetron (ZOFRAN ODT) 4 MG ODT tab  Prenatal Vit-Fe Fumarate-FA (PRENATAL MULTIVITAMIN W/IRON) 27-0.8 MG tablet  pyridOXINE (VITAMIN B6) 25 MG tablet      No Known Allergies  Family History  Family History   Problem Relation Age of Onset     Diabetes No family hx of         type 1 diabetes or autoimmunity     Social History   Social History     Tobacco Use     Smoking status: Never     Smokeless tobacco: Never   Substance Use Topics     Alcohol use: Yes     Drug use: Yes     Types: Marijuana      Past medical history, past surgical history, medications, allergies, family history, and social history were reviewed with the patient. No additional pertinent items.      A medically appropriate review of systems was performed with pertinent positives and negatives noted in the HPI, and all other systems negative.    Physical Exam      Physical Exam  Vitals and nursing note reviewed.   Constitutional:       General: She is not in acute distress.     Appearance: She is well-developed. She is not diaphoretic.   HENT:      Head: Normocephalic and atraumatic.      Mouth/Throat:      Pharynx: No oropharyngeal exudate.   Eyes:      General: No scleral icterus.        Right eye: No discharge.         Left eye: No discharge.      Pupils: Pupils are equal, round, and reactive to light.   Cardiovascular:      Rate and Rhythm: Normal rate and regular rhythm.      Heart sounds: Normal heart sounds. No murmur heard.    No friction rub. No gallop.   Pulmonary:      Effort: Pulmonary effort is normal. No respiratory distress.      Breath sounds: Normal breath sounds. No wheezing.   Chest:      Chest wall: No tenderness.   Abdominal:      General: Bowel sounds are normal. There is no distension.      Palpations: Abdomen is soft.      Tenderness: There is no abdominal tenderness.   Musculoskeletal:         General: No tenderness or  deformity. Normal range of motion.      Cervical back: Normal range of motion and neck supple.   Skin:     General: Skin is warm and dry.      Coloration: Skin is not pale.      Findings: No erythema or rash.   Neurological:      Mental Status: She is alert and oriented to person, place, and time.      Cranial Nerves: No cranial nerve deficit.           ED Course, Procedures, & Data      Procedures                      No results found for any visits on 02/01/23.  Medications - No data to display  Labs Ordered and Resulted from Time of ED Arrival to Time of ED Departure - No data to display  No orders to display          Medical Decision Making  The patient's presentation is strongly suggestive of a chronic illness mild to moderate exacerbation, progression, or side effect of treatment.    The patient's evaluation involved:  review of external note(s) from 1 sources (Previous note)  ordering and/or review of 3+ test(s) in this encounter (see separate area of note for details)  discussion of management or test interpretation with another health professional (Infectious Disease)    The patient's management involved prescription drug management (including medications given in the ED) and a decision regarding hospitalization.      Assessment & Plan    This is a 22-year-old female with type 1 diabetes and a history of C. difficile who presents with diarrhea.  This has been worsening for the past several days.  Patient took the first 5 days of a course of p.o. vancomycin.  This relieved her symptoms but they have since returned.  She also notes mid abdominal cramping.  She states her blood sugars have been higher than usual.  Exam there is no acute abnormalities.  Patient is well-appearing.  I discussed case with Infectious Disease who recommends restarting p.o. vancomycin and continue this for 14 days.  Patient is also inquiring about other medications that do not require 4 times a day dosing.  After discussion with ID  it was determined the patient could be on a 10-day course of fidaxomicin however this may not be covered by insurance.  We will provide a prescription for each of these medications with instructions to take one of them.  Discussed isolation precautions with patient due to patient's work.  Patient did have increased blood glucose and there were small amount of ketones but patient was not overtly acidotic and patient does not appear to be in DKA.  Discussed that illness increase the risk of DKA and to monitor blood glucose carefully. Will discharge home with return precautions. Discussed reasons to return to the emergency department.  Patient understands and agrees with this plan.    I have reviewed the nursing notes. I have reviewed the findings, diagnosis, plan and need for follow up with the patient.    New Prescriptions    No medications on file       Final diagnoses:   None       Sourav Thompson DO  [facility] EMERGENCY DEPARTMENT  2/1/2023     Sourav Thompson DO  02/01/23 [id number]

## 2023-02-01 NOTE — DISCHARGE INSTRUCTIONS
Take either of the medications for the entire course. Return to the emergency department if symptoms continue, get worse, there are any new symptoms or any cause for concern.

## 2023-02-18 ENCOUNTER — APPOINTMENT (OUTPATIENT)
Dept: CT IMAGING | Facility: CLINIC | Age: 23
DRG: 639 | End: 2023-02-18
Attending: STUDENT IN AN ORGANIZED HEALTH CARE EDUCATION/TRAINING PROGRAM
Payer: COMMERCIAL

## 2023-02-18 ENCOUNTER — HOSPITAL ENCOUNTER (INPATIENT)
Facility: CLINIC | Age: 23
LOS: 1 days | Discharge: LEFT BEFORE DISCHARGE | DRG: 639 | End: 2023-02-18
Attending: EMERGENCY MEDICINE | Admitting: STUDENT IN AN ORGANIZED HEALTH CARE EDUCATION/TRAINING PROGRAM
Payer: COMMERCIAL

## 2023-02-18 VITALS
WEIGHT: 110 LBS | HEIGHT: 59 IN | RESPIRATION RATE: 14 BRPM | OXYGEN SATURATION: 100 % | HEART RATE: 106 BPM | TEMPERATURE: 98.1 F | BODY MASS INDEX: 22.18 KG/M2 | DIASTOLIC BLOOD PRESSURE: 97 MMHG | SYSTOLIC BLOOD PRESSURE: 142 MMHG

## 2023-02-18 DIAGNOSIS — Z11.52 ENCOUNTER FOR SCREENING LABORATORY TESTING FOR SEVERE ACUTE RESPIRATORY SYNDROME CORONAVIRUS 2 (SARS-COV-2): ICD-10-CM

## 2023-02-18 DIAGNOSIS — R19.7 DIARRHEA, UNSPECIFIED TYPE: ICD-10-CM

## 2023-02-18 DIAGNOSIS — E10.10 DIABETIC KETOACIDOSIS WITHOUT COMA ASSOCIATED WITH TYPE 1 DIABETES MELLITUS (H): ICD-10-CM

## 2023-02-18 LAB
ALBUMIN SERPL BCG-MCNC: 4 G/DL (ref 3.5–5.2)
ALBUMIN UR-MCNC: NEGATIVE MG/DL
ALP SERPL-CCNC: 190 U/L (ref 35–104)
ALT SERPL W P-5'-P-CCNC: 10 U/L (ref 10–35)
ANION GAP SERPL CALCULATED.3IONS-SCNC: 13 MMOL/L (ref 7–15)
ANION GAP SERPL CALCULATED.3IONS-SCNC: 14 MMOL/L (ref 7–15)
ANION GAP SERPL CALCULATED.3IONS-SCNC: 19 MMOL/L (ref 7–15)
APPEARANCE UR: ABNORMAL
AST SERPL W P-5'-P-CCNC: 16 U/L (ref 10–35)
ATRIAL RATE - MUSE: 107 BPM
B-OH-BUTYR SERPL-MCNC: 0.5 MMOL/L
B-OH-BUTYR SERPL-MCNC: 0.6 MMOL/L
B-OH-BUTYR SERPL-MCNC: 2.6 MMOL/L
B-OH-BUTYR SERPL-MCNC: 4.8 MMOL/L
B-OH-BUTYR SERPL-MCNC: <0.18 MMOL/L
BASE EXCESS BLDV CALC-SCNC: -9.2 MMOL/L (ref -7.7–1.9)
BASOPHILS # BLD AUTO: 0 10E3/UL (ref 0–0.2)
BASOPHILS NFR BLD AUTO: 0 %
BILIRUB SERPL-MCNC: 0.3 MG/DL
BILIRUB UR QL STRIP: NEGATIVE
BUN SERPL-MCNC: 10.2 MG/DL (ref 6–20)
BUN SERPL-MCNC: 11.7 MG/DL (ref 6–20)
BUN SERPL-MCNC: 9.6 MG/DL (ref 6–20)
CALCIUM SERPL-MCNC: 8.8 MG/DL (ref 8.6–10)
CALCIUM SERPL-MCNC: 9.3 MG/DL (ref 8.6–10)
CALCIUM SERPL-MCNC: 9.7 MG/DL (ref 8.6–10)
CHLORIDE SERPL-SCNC: 100 MMOL/L (ref 98–107)
CHLORIDE SERPL-SCNC: 103 MMOL/L (ref 98–107)
CHLORIDE SERPL-SCNC: 105 MMOL/L (ref 98–107)
COLOR UR AUTO: ABNORMAL
CREAT SERPL-MCNC: 0.66 MG/DL (ref 0.51–0.95)
CREAT SERPL-MCNC: 0.69 MG/DL (ref 0.51–0.95)
CREAT SERPL-MCNC: 0.74 MG/DL (ref 0.51–0.95)
DEPRECATED HCO3 PLAS-SCNC: 15 MMOL/L (ref 22–29)
DEPRECATED HCO3 PLAS-SCNC: 16 MMOL/L (ref 22–29)
DEPRECATED HCO3 PLAS-SCNC: 18 MMOL/L (ref 22–29)
DIASTOLIC BLOOD PRESSURE - MUSE: NORMAL MMHG
EOSINOPHIL # BLD AUTO: 0 10E3/UL (ref 0–0.7)
EOSINOPHIL NFR BLD AUTO: 0 %
ERYTHROCYTE [DISTWIDTH] IN BLOOD BY AUTOMATED COUNT: 15.7 % (ref 10–15)
FLUAV RNA SPEC QL NAA+PROBE: NEGATIVE
FLUBV RNA RESP QL NAA+PROBE: NEGATIVE
GFR SERPL CREATININE-BSD FRML MDRD: >90 ML/MIN/1.73M2
GLUCOSE BLDC GLUCOMTR-MCNC: 105 MG/DL (ref 70–99)
GLUCOSE BLDC GLUCOMTR-MCNC: 122 MG/DL (ref 70–99)
GLUCOSE BLDC GLUCOMTR-MCNC: 152 MG/DL (ref 70–99)
GLUCOSE BLDC GLUCOMTR-MCNC: 155 MG/DL (ref 70–99)
GLUCOSE BLDC GLUCOMTR-MCNC: 181 MG/DL (ref 70–99)
GLUCOSE BLDC GLUCOMTR-MCNC: 251 MG/DL (ref 70–99)
GLUCOSE BLDC GLUCOMTR-MCNC: 328 MG/DL (ref 70–99)
GLUCOSE BLDC GLUCOMTR-MCNC: 402 MG/DL (ref 70–99)
GLUCOSE SERPL-MCNC: 150 MG/DL (ref 70–99)
GLUCOSE SERPL-MCNC: 154 MG/DL (ref 70–99)
GLUCOSE SERPL-MCNC: 315 MG/DL (ref 70–99)
GLUCOSE UR STRIP-MCNC: >=1000 MG/DL
HCG UR QL: NEGATIVE
HCO3 BLDV-SCNC: 16 MMOL/L (ref 21–28)
HCT VFR BLD AUTO: 41.8 % (ref 35–47)
HGB BLD-MCNC: 12.9 G/DL (ref 11.7–15.7)
HGB UR QL STRIP: NEGATIVE
HOLD SPECIMEN: NORMAL
IMM GRANULOCYTES # BLD: 0 10E3/UL
IMM GRANULOCYTES NFR BLD: 0 %
INTERPRETATION ECG - MUSE: NORMAL
KETONES UR STRIP-MCNC: 80 MG/DL
LACTATE SERPL-SCNC: 0.9 MMOL/L (ref 0.7–2)
LEUKOCYTE ESTERASE UR QL STRIP: ABNORMAL
LIPASE SERPL-CCNC: 20 U/L (ref 13–60)
LIPASE SERPL-CCNC: 21 U/L (ref 13–60)
LYMPHOCYTES # BLD AUTO: 2 10E3/UL (ref 0.8–5.3)
LYMPHOCYTES NFR BLD AUTO: 22 %
MAGNESIUM SERPL-MCNC: 1.6 MG/DL (ref 1.7–2.3)
MCH RBC QN AUTO: 26.3 PG (ref 26.5–33)
MCHC RBC AUTO-ENTMCNC: 30.9 G/DL (ref 31.5–36.5)
MCV RBC AUTO: 85 FL (ref 78–100)
MONOCYTES # BLD AUTO: 0.7 10E3/UL (ref 0–1.3)
MONOCYTES NFR BLD AUTO: 7 %
MUCOUS THREADS #/AREA URNS LPF: PRESENT /LPF
NEUTROPHILS # BLD AUTO: 6.5 10E3/UL (ref 1.6–8.3)
NEUTROPHILS NFR BLD AUTO: 71 %
NITRATE UR QL: NEGATIVE
NRBC # BLD AUTO: 0 10E3/UL
NRBC BLD AUTO-RTO: 0 /100
O2/TOTAL GAS SETTING VFR VENT: 100 %
OSMOLALITY SERPL: 305 MMOL/KG (ref 275–295)
P AXIS - MUSE: 52 DEGREES
PCO2 BLDV: 32 MM HG (ref 40–50)
PH BLDV: 7.31 [PH] (ref 7.32–7.43)
PH UR STRIP: 5 [PH] (ref 5–7)
PHOSPHATE SERPL-MCNC: 5.4 MG/DL (ref 2.5–4.5)
PLATELET # BLD AUTO: 264 10E3/UL (ref 150–450)
PO2 BLDV: 53 MM HG (ref 25–47)
POTASSIUM SERPL-SCNC: 3.9 MMOL/L (ref 3.4–5.3)
POTASSIUM SERPL-SCNC: 4.3 MMOL/L (ref 3.4–5.3)
POTASSIUM SERPL-SCNC: 4.7 MMOL/L (ref 3.4–5.3)
PR INTERVAL - MUSE: 122 MS
PROT SERPL-MCNC: 7.9 G/DL (ref 6.4–8.3)
QRS DURATION - MUSE: 70 MS
QT - MUSE: 344 MS
QTC - MUSE: 459 MS
R AXIS - MUSE: 51 DEGREES
RBC # BLD AUTO: 4.91 10E6/UL (ref 3.8–5.2)
RBC URINE: 3 /HPF
RSV RNA SPEC NAA+PROBE: NEGATIVE
SARS-COV-2 RNA RESP QL NAA+PROBE: NEGATIVE
SODIUM SERPL-SCNC: 134 MMOL/L (ref 136–145)
SODIUM SERPL-SCNC: 134 MMOL/L (ref 136–145)
SODIUM SERPL-SCNC: 135 MMOL/L (ref 136–145)
SP GR UR STRIP: 1.02 (ref 1–1.03)
SQUAMOUS EPITHELIAL: 8 /HPF
SYSTOLIC BLOOD PRESSURE - MUSE: NORMAL MMHG
T AXIS - MUSE: 54 DEGREES
TRANSITIONAL EPI: <1 /HPF
TROPONIN T SERPL HS-MCNC: <6 NG/L
UROBILINOGEN UR STRIP-MCNC: NORMAL MG/DL
VENTRICULAR RATE- MUSE: 107 BPM
WBC # BLD AUTO: 9.2 10E3/UL (ref 4–11)
WBC URINE: 1 /HPF

## 2023-02-18 PROCEDURE — 84484 ASSAY OF TROPONIN QUANT: CPT | Performed by: STUDENT IN AN ORGANIZED HEALTH CARE EDUCATION/TRAINING PROGRAM

## 2023-02-18 PROCEDURE — 82010 KETONE BODYS QUAN: CPT | Performed by: STUDENT IN AN ORGANIZED HEALTH CARE EDUCATION/TRAINING PROGRAM

## 2023-02-18 PROCEDURE — 86341 ISLET CELL ANTIBODY: CPT | Performed by: STUDENT IN AN ORGANIZED HEALTH CARE EDUCATION/TRAINING PROGRAM

## 2023-02-18 PROCEDURE — 36415 COLL VENOUS BLD VENIPUNCTURE: CPT | Performed by: STUDENT IN AN ORGANIZED HEALTH CARE EDUCATION/TRAINING PROGRAM

## 2023-02-18 PROCEDURE — 93005 ELECTROCARDIOGRAM TRACING: CPT

## 2023-02-18 PROCEDURE — C9803 HOPD COVID-19 SPEC COLLECT: HCPCS

## 2023-02-18 PROCEDURE — 83735 ASSAY OF MAGNESIUM: CPT | Performed by: STUDENT IN AN ORGANIZED HEALTH CARE EDUCATION/TRAINING PROGRAM

## 2023-02-18 PROCEDURE — 83690 ASSAY OF LIPASE: CPT | Performed by: STUDENT IN AN ORGANIZED HEALTH CARE EDUCATION/TRAINING PROGRAM

## 2023-02-18 PROCEDURE — 80349 CANNABINOIDS NATURAL: CPT | Performed by: STUDENT IN AN ORGANIZED HEALTH CARE EDUCATION/TRAINING PROGRAM

## 2023-02-18 PROCEDURE — 81025 URINE PREGNANCY TEST: CPT | Performed by: STUDENT IN AN ORGANIZED HEALTH CARE EDUCATION/TRAINING PROGRAM

## 2023-02-18 PROCEDURE — 80307 DRUG TEST PRSMV CHEM ANLYZR: CPT | Performed by: STUDENT IN AN ORGANIZED HEALTH CARE EDUCATION/TRAINING PROGRAM

## 2023-02-18 PROCEDURE — 258N000003 HC RX IP 258 OP 636: Performed by: EMERGENCY MEDICINE

## 2023-02-18 PROCEDURE — 99223 1ST HOSP IP/OBS HIGH 75: CPT | Mod: AI | Performed by: STUDENT IN AN ORGANIZED HEALTH CARE EDUCATION/TRAINING PROGRAM

## 2023-02-18 PROCEDURE — 80053 COMPREHEN METABOLIC PANEL: CPT | Performed by: EMERGENCY MEDICINE

## 2023-02-18 PROCEDURE — 93010 ELECTROCARDIOGRAM REPORT: CPT | Performed by: EMERGENCY MEDICINE

## 2023-02-18 PROCEDURE — 250N000011 HC RX IP 250 OP 636: Performed by: STUDENT IN AN ORGANIZED HEALTH CARE EDUCATION/TRAINING PROGRAM

## 2023-02-18 PROCEDURE — 250N000012 HC RX MED GY IP 250 OP 636 PS 637: Performed by: STUDENT IN AN ORGANIZED HEALTH CARE EDUCATION/TRAINING PROGRAM

## 2023-02-18 PROCEDURE — 87040 BLOOD CULTURE FOR BACTERIA: CPT | Performed by: STUDENT IN AN ORGANIZED HEALTH CARE EDUCATION/TRAINING PROGRAM

## 2023-02-18 PROCEDURE — 99285 EMERGENCY DEPT VISIT HI MDM: CPT | Mod: 25 | Performed by: EMERGENCY MEDICINE

## 2023-02-18 PROCEDURE — 96374 THER/PROPH/DIAG INJ IV PUSH: CPT

## 2023-02-18 PROCEDURE — 36415 COLL VENOUS BLD VENIPUNCTURE: CPT | Performed by: EMERGENCY MEDICINE

## 2023-02-18 PROCEDURE — 74177 CT ABD & PELVIS W/CONTRAST: CPT | Mod: 26 | Performed by: RADIOLOGY

## 2023-02-18 PROCEDURE — 96361 HYDRATE IV INFUSION ADD-ON: CPT

## 2023-02-18 PROCEDURE — 82803 BLOOD GASES ANY COMBINATION: CPT | Performed by: EMERGENCY MEDICINE

## 2023-02-18 PROCEDURE — 99285 EMERGENCY DEPT VISIT HI MDM: CPT | Mod: 25

## 2023-02-18 PROCEDURE — 81001 URINALYSIS AUTO W/SCOPE: CPT | Performed by: EMERGENCY MEDICINE

## 2023-02-18 PROCEDURE — 83930 ASSAY OF BLOOD OSMOLALITY: CPT | Performed by: STUDENT IN AN ORGANIZED HEALTH CARE EDUCATION/TRAINING PROGRAM

## 2023-02-18 PROCEDURE — 82962 GLUCOSE BLOOD TEST: CPT

## 2023-02-18 PROCEDURE — 250N000011 HC RX IP 250 OP 636: Performed by: EMERGENCY MEDICINE

## 2023-02-18 PROCEDURE — 120N000002 HC R&B MED SURG/OB UMMC

## 2023-02-18 PROCEDURE — 82010 KETONE BODYS QUAN: CPT | Performed by: EMERGENCY MEDICINE

## 2023-02-18 PROCEDURE — 258N000003 HC RX IP 258 OP 636: Performed by: STUDENT IN AN ORGANIZED HEALTH CARE EDUCATION/TRAINING PROGRAM

## 2023-02-18 PROCEDURE — 99284 EMERGENCY DEPT VISIT MOD MDM: CPT | Performed by: INTERNAL MEDICINE

## 2023-02-18 PROCEDURE — 84100 ASSAY OF PHOSPHORUS: CPT | Performed by: STUDENT IN AN ORGANIZED HEALTH CARE EDUCATION/TRAINING PROGRAM

## 2023-02-18 PROCEDURE — 99221 1ST HOSP IP/OBS SF/LOW 40: CPT | Mod: GC | Performed by: INTERNAL MEDICINE

## 2023-02-18 PROCEDURE — 74177 CT ABD & PELVIS W/CONTRAST: CPT

## 2023-02-18 PROCEDURE — 250N000009 HC RX 250: Performed by: STUDENT IN AN ORGANIZED HEALTH CARE EDUCATION/TRAINING PROGRAM

## 2023-02-18 PROCEDURE — 87637 SARSCOV2&INF A&B&RSV AMP PRB: CPT | Performed by: EMERGENCY MEDICINE

## 2023-02-18 PROCEDURE — 85025 COMPLETE CBC W/AUTO DIFF WBC: CPT | Performed by: EMERGENCY MEDICINE

## 2023-02-18 PROCEDURE — 83690 ASSAY OF LIPASE: CPT | Performed by: EMERGENCY MEDICINE

## 2023-02-18 PROCEDURE — 80048 BASIC METABOLIC PNL TOTAL CA: CPT | Performed by: STUDENT IN AN ORGANIZED HEALTH CARE EDUCATION/TRAINING PROGRAM

## 2023-02-18 PROCEDURE — 83605 ASSAY OF LACTIC ACID: CPT | Performed by: EMERGENCY MEDICINE

## 2023-02-18 RX ORDER — VANCOMYCIN HYDROCHLORIDE 125 MG/1
125 CAPSULE ORAL 4 TIMES DAILY
COMMUNITY
Start: 2023-02-01 | End: 2023-02-18

## 2023-02-18 RX ORDER — VANCOMYCIN HYDROCHLORIDE 125 MG/1
125 CAPSULE ORAL 4 TIMES DAILY
Status: DISCONTINUED | OUTPATIENT
Start: 2023-02-18 | End: 2023-02-18

## 2023-02-18 RX ORDER — ONDANSETRON 2 MG/ML
4 INJECTION INTRAMUSCULAR; INTRAVENOUS ONCE
Status: COMPLETED | OUTPATIENT
Start: 2023-02-18 | End: 2023-02-18

## 2023-02-18 RX ORDER — DEXTROSE MONOHYDRATE 25 G/50ML
25-50 INJECTION, SOLUTION INTRAVENOUS
Status: DISCONTINUED | OUTPATIENT
Start: 2023-02-18 | End: 2023-02-18 | Stop reason: HOSPADM

## 2023-02-18 RX ORDER — YOHIMBE BARK 500 MG
500 CAPSULE ORAL DAILY
COMMUNITY

## 2023-02-18 RX ORDER — PARSLEY 450 MG
500 CAPSULE ORAL DAILY
COMMUNITY

## 2023-02-18 RX ORDER — SODIUM CHLORIDE AND POTASSIUM CHLORIDE 150; 450 MG/100ML; MG/100ML
INJECTION, SOLUTION INTRAVENOUS CONTINUOUS
Status: DISCONTINUED | OUTPATIENT
Start: 2023-02-18 | End: 2023-02-18

## 2023-02-18 RX ORDER — DEXTROSE MONOHYDRATE 100 MG/ML
INJECTION, SOLUTION INTRAVENOUS CONTINUOUS PRN
Status: DISCONTINUED | OUTPATIENT
Start: 2023-02-18 | End: 2023-02-18

## 2023-02-18 RX ORDER — NICOTINE POLACRILEX 4 MG
15-30 LOZENGE BUCCAL
Status: DISCONTINUED | OUTPATIENT
Start: 2023-02-18 | End: 2023-02-18

## 2023-02-18 RX ORDER — HEPARIN SODIUM 5000 [USP'U]/.5ML
5000 INJECTION, SOLUTION INTRAVENOUS; SUBCUTANEOUS EVERY 12 HOURS
Status: DISCONTINUED | OUTPATIENT
Start: 2023-02-18 | End: 2023-02-18 | Stop reason: HOSPADM

## 2023-02-18 RX ORDER — CAYENNE 450 MG
1 CAPSULE ORAL DAILY
COMMUNITY

## 2023-02-18 RX ORDER — DEXTROSE MONOHYDRATE 25 G/50ML
25-50 INJECTION, SOLUTION INTRAVENOUS
Status: DISCONTINUED | OUTPATIENT
Start: 2023-02-18 | End: 2023-02-18

## 2023-02-18 RX ORDER — IOPAMIDOL 755 MG/ML
68 INJECTION, SOLUTION INTRAVASCULAR ONCE
Status: COMPLETED | OUTPATIENT
Start: 2023-02-18 | End: 2023-02-18

## 2023-02-18 RX ORDER — DEXTROSE MONOHYDRATE, SODIUM CHLORIDE, AND POTASSIUM CHLORIDE 50; 1.49; 4.5 G/1000ML; G/1000ML; G/1000ML
INJECTION, SOLUTION INTRAVENOUS CONTINUOUS
Status: DISCONTINUED | OUTPATIENT
Start: 2023-02-18 | End: 2023-02-18 | Stop reason: HOSPADM

## 2023-02-18 RX ORDER — NICOTINE POLACRILEX 4 MG
15-30 LOZENGE BUCCAL
Status: DISCONTINUED | OUTPATIENT
Start: 2023-02-18 | End: 2023-02-18 | Stop reason: HOSPADM

## 2023-02-18 RX ADMIN — POTASSIUM CHLORIDE AND SODIUM CHLORIDE: 450; 150 INJECTION, SOLUTION INTRAVENOUS at 10:27

## 2023-02-18 RX ADMIN — SODIUM CHLORIDE 1000 ML: 9 INJECTION, SOLUTION INTRAVENOUS at 07:17

## 2023-02-18 RX ADMIN — POTASSIUM CHLORIDE, DEXTROSE MONOHYDRATE AND SODIUM CHLORIDE: 150; 5; 450 INJECTION, SOLUTION INTRAVENOUS at 13:02

## 2023-02-18 RX ADMIN — IOPAMIDOL 68 ML: 755 INJECTION, SOLUTION INTRAVENOUS at 14:03

## 2023-02-18 RX ADMIN — HEPARIN SODIUM 5000 UNITS: 5000 INJECTION, SOLUTION INTRAVENOUS; SUBCUTANEOUS at 10:27

## 2023-02-18 RX ADMIN — HUMAN INSULIN 1.5 UNITS/HR: 100 INJECTION, SOLUTION SUBCUTANEOUS at 12:45

## 2023-02-18 RX ADMIN — SODIUM CHLORIDE, POTASSIUM CHLORIDE, SODIUM LACTATE AND CALCIUM CHLORIDE 1000 ML: 600; 310; 30; 20 INJECTION, SOLUTION INTRAVENOUS at 15:03

## 2023-02-18 RX ADMIN — ONDANSETRON 4 MG: 2 INJECTION INTRAMUSCULAR; INTRAVENOUS at 07:17

## 2023-02-18 RX ADMIN — INSULIN GLARGINE 8 UNITS: 100 INJECTION, SOLUTION SUBCUTANEOUS at 15:04

## 2023-02-18 RX ADMIN — HUMAN INSULIN 5.5 UNITS/HR: 100 INJECTION, SOLUTION SUBCUTANEOUS at 10:30

## 2023-02-18 ASSESSMENT — ACTIVITIES OF DAILY LIVING (ADL)
ADLS_ACUITY_SCORE: 35

## 2023-02-18 NOTE — H&P
[facility]    History and Physical - Hospitalist Service, GOLD TEAM 12       Date of Admission:  2/18/2023    Assessment & Plan      Myriam Wylie is a 22 year old female admitted on 2/18/2023. She presents with high blood sugars, anion gap, positive ketones and metabolic acidosis most concerning for diabetic ketoacidosis.     Plan     Diabetic ketoacidosis   Unclear trigger, recently treated for clostridium difficile, will assess for infections, intoxicants, pancreatitis, coronary ischemia, pt does not endorse noncompliant and no active signs of autoimmune flair   -Started insulin 1 unit/kg protocol to close anion gap and then switch to sub q insulin  -Started 0.45 nacl with 20 demetrius kcl for hydration and electrolyte management and then switch to d5 version once blood sugar less than 200    -Plan to monitor fingerstick q1 hourly   -BMP checks q4 hourly   -ketone checks q2 hourly   -ordered antigad levels   -a1c assessment   -Endocrine consult   -Consulted diabetic teaching and nutrition consult     Home diabetic regiment   -Lantus 15 units at noon   -Aspart 1 unit for 7 gram carbs in am   -Aspart 1 unit for 10 grams carbs in afternoon and supper time     Nausea   As needed zofran      Body aches   As needed prn tylenol     H/o clostridium difficile 2016 and 1/14/2022  Would test both antigen and toxin, mentions was treated with fidoxamycin back in 2016, started treatment on 1/14/2022 and was given a 10 day script that was later extended per the patient, noted fidoxamycin script from 2/1 for another 10 days, currently patient is not taking treatment for the same,   -Would consult assistance of pharmacy team to determine c diff treatment so far  -Consulted ID to determine if new taper course should be considered        Diet: NPO for Medical/Clinical Reasons Except for: Meds, Ice Chips    DVT Prophylaxis: Heparin SQ  Martines Catheter: Not present  Lines: None     Cardiac  Monitoring: None  Code Status:   full code     Clinically Significant Risk Factors Present on Admission            # Hypomagnesemia: Lowest Mg = 1.6 mg/dL in last 2 days, will replace as needed  # Anion Gap Metabolic Acidosis: Highest Anion Gap = 19 mmol/L in last 2 days, will monitor and treat as appropriate                   Disposition Plan      Expected Discharge Date: 02/20/2023                  Pascual Marmolejo MD  Hospitalist Service, GOLD TEAM 12  [facility]  Securely message with [employer] (more info)  Text page via [facility] Paging/Directory   See signed in provider for up to date coverage information    ______________________________________________________________________    Chief Complaint   Nausea and vomiting     History is obtained from the patient    History of Present Illness   Myriam Wylie is a 22 year old female with pmh type 1 diabetes, h/o clostridium difficile presented with nausea, vomiting and vague malaise, Patient mentions her symptoms have been ongoing for the last 4-5 days, She  Had presented with a sore throat to [facility] on 2/16 but did not want to get admitted at that time, Patient mentions her symptoms are worse with activity and food consumption and are better with rest and oral hydration, She does endorse vague fatigue and some loose stools that have been ongoing since her diagnosis of clostridium difficile, No yellowish discoloration of the skin, No fevers or other symptoms.     IN the ER blood work was drawn that were consistent with DKA, she was given 1 litre of iv bolus and iv zofran, Additional workup was ordered to rule out etiology of dka, and she was started on insulin drip with Half normal saline.         Past Medical History    Past Medical History:   Diagnosis Date     C. difficile colitis      Diabetes type 1, uncontrolled      DKA (diabetic ketoacidosis) (H)      PID (acute pelvic inflammatory disease)        Past Surgical History    Past Surgical History:   Procedure Laterality Date     COLONOSCOPY N/A 2019    Procedure: COLONOSCOPY;  Surgeon: Jeremi Banks MD;  Location: [facility]      ESOPHAGOSCOPY, GASTROSCOPY, DUODENOSCOPY (EGD), COMBINED N/A 2019    Procedure: Upper endoscopy and colonoscopy with biopsy;  Surgeon: Jeremi Banks MD;  Location: [facility]        Prior to Admission Medications   Prior to Admission Medications   Prescriptions Last Dose Informant Patient Reported? Taking?   Glucagon 0.5 MG/0.1ML SOSY  Self No No   Sig: Inject 1 ampule Subcutaneous daily as needed   Prenatal Vit-Fe Fumarate-FA (PRENATAL MULTIVITAMIN W/IRON) 27-0.8 MG tablet  Self No No   Sig: Take 1 tablet by mouth daily   acetone, Urine, test STRP  Self No No   Si strip by In Vitro route as needed   blood glucose monitoring (ACCU-CHEK FASTCLIX) lancets  Self No No   Sig: Use to test blood sugar 6 times daily or as directed.   blood glucose monitoring (ACCU-CHEK HENRI SMARTVIEW) meter device kit  Self No No   Sig: Use to test blood sugar 6 times daily or as directed.   blood glucose monitoring (ACCU-CHEK SMARTVIEW) test strip  Self No No   Sig: Use to test blood sugar 6 times daily or as directed.   fluticasone (FLONASE) 50 MCG/ACT nasal spray   Yes No   Sig: Spray 2 sprays into both nostrils daily SHAKE LIQUID AND USE   insulin aspart (NOVOLOG PEN) 100 UNIT/ML pen  Self No No   Sig: Inject 1-5 Units Subcutaneous At Bedtime   insulin aspart (NOVOLOG PEN) 100 UNIT/ML pen   No No   Sig: Inject 1-5 Units Subcutaneous 4 times daily (with meals and nightly)   insulin glargine (BASAGLAR KWIKPEN) 100 UNIT/ML pen  Self No No   Sig: Inject 14 Units Subcutaneous daily At noon   Patient taking differently: Inject 15 Units Subcutaneous daily At noon   insulin pen needle (BD HENRI U/F) 32G X 4 MM  Self No No   Sig: Use 6 pen needles daily or as directed.   ondansetron (ZOFRAN ODT) 4 MG ODT tab   Yes No   Sig: Take 4 mg by mouth every 6 hours as  needed   pyridOXINE (VITAMIN B6) 25 MG tablet  Self No No   Sig: Take 1 tablet (25 mg) by mouth 3 times daily      Facility-Administered Medications: None        Review of Systems    CONSTITUTIONAL:  positive for  fatigue and malaise  RESPIRATORY:  negative for  dyspnea, wheezing and hemoptysis  CARDIOVASCULAR:  negative for  orthopnea, PND, exertional chest pressure/discomfort  GASTROINTESTINAL:  positive for nausea, diarrhea and abdominal pain  HEMATOLOGIC/LYMPHATIC:  negative for bleeding, lymphadenopathy and petechiae  ENDOCRINE:  negative for tremor, weight changes and change in bowel habits  MUSCULOSKELETAL:  negative for  pain, joint swelling and stiff joints  NEUROLOGICAL:  negative for dysphagia, weakness and numbness  BEHAVIOR/PSYCH:  negative for decreased energy level, increased energy level and poor concentration     Physical Exam   Vital Signs: Temp: 98.6  F (37  C) Temp src: Oral BP: 130/72 Pulse: 118   Resp: 17 SpO2: 100 % O2 Device: None (Room air)    Weight: 110 lbs 0 oz    Constitutional: awake, alert, cooperative, no apparent distress, and appears stated age  Eyes: Lids and lashes normal, pupils equal, round and reactive to light, extra ocular muscles intact, sclera clear, conjunctiva normal  ENT: Normocephalic, without obvious abnormality, atraumatic, sinuses nontender on palpation, external ears without lesions, oral pharynx with moist mucous membranes, tonsils without erythema or exudates, gums normal and good dentition.  Hematologic / Lymphatic: no cervical lymphadenopathy  Respiratory: No increased work of breathing, good air exchange, clear to auscultation bilaterally, no crackles or wheezing  Cardiovascular: Normal apical impulse, regular rate and rhythm, normal S1 and S2, no S3 or S4, and no murmur noted  GI: No scars, normal bowel sounds, soft, non-distended, non-tender, no masses palpated, no hepatosplenomegally  Genitounirinary:   Skin: no bruising or bleeding  Musculoskeletal: There  is no redness, warmth, or swelling of the joints.  Full range of motion noted.  Motor strength is 5 out of 5 all extremities bilaterally.  Tone is normal.  Neurologic: Awake, alert, oriented to name, place and time.  Cranial nerves II-XII are grossly intact.  Motor is 5 out of 5 bilaterally.  Cerebellar finger to nose, heel to shin intact.  Sensory is intact.  Babinski down going, Romberg negative, and gait is normal.  Neuropsychiatric: General: normal, calm and normal eye contact    Medical Decision Making       56 MINUTES SPENT BY ME on the date of service doing chart review, history, exam, documentation & further activities per the note.      Data     I have personally reviewed the following data over the past 24 hrs:    9.2  \   12.9   / 264     134 (L) 100 10.2 /  251 (H)   4.7 15 (L) 0.74 \       ALT: 10 AST: 16 AP: 190 (H) TBILI: 0.3   ALB: 4.0 TOT PROTEIN: 7.9 LIPASE: 21; 20       Trop: <6 BNP: N/A       Procal: N/A CRP: N/A Lactic Acid: 0.9         Imaging results reviewed over the past 24 hrs:   No results found for this or any previous visit (from the past 24 hour(s)).

## 2023-02-18 NOTE — ED PROVIDER NOTES
"ED Provider Note  [facility]      History     Chief Complaint   Patient presents with     Abdominal Pain     HPI  Myriam Wylie is a 22 year old female with a past medical history of diabetes type I on insulin, DKA, and C. difficile colitis who presents with a chief complaint \"I am in DKA.\".  The patient states she was seen at [facility] on Thursday, 2 days ago.  She states she was in borderline DKA and they wanted to admit her at that time.  She did not like their care and felt they were not listening to her and she went home.  She states she has had C. difficile for about a month and a half.  She is on Vanco and not getting in any better.  She is vomiting, continuing to have diarrhea and feels very dehydrated.  She has multiple stools a day, but is unable to quantify them.  She believes she has vomited about 8 times since yesterday.    Social: Here with her boyfriend      Past Medical History  Past Medical History:   Diagnosis Date     C. difficile colitis      Diabetes type 1, uncontrolled      DKA (diabetic ketoacidosis) (H)      PID (acute pelvic inflammatory disease)      Past Surgical History:   Procedure Laterality Date     COLONOSCOPY N/A 9/18/2019    Procedure: COLONOSCOPY;  Surgeon: Jeremi Banks MD;  Location: [facility]      ESOPHAGOSCOPY, GASTROSCOPY, DUODENOSCOPY (EGD), COMBINED N/A 9/18/2019    Procedure: Upper endoscopy and colonoscopy with biopsy;  Surgeon: Jeremi Banks MD;  Location: [facility]      acetone, Urine, test STRP  blood glucose monitoring (ACCU-CHEK FASTCLIX) lancets  blood glucose monitoring (ACCU-CHEK HENRI SMARTVIEW) meter device kit  blood glucose monitoring (ACCU-CHEK SMARTVIEW) test strip  fluticasone (FLONASE) 50 MCG/ACT nasal spray  Glucagon 0.5 MG/0.1ML SOSY  insulin aspart (NOVOLOG PEN) 100 UNIT/ML pen  insulin aspart (NOVOLOG PEN) 100 UNIT/ML pen  insulin glargine (BASAGLAR KWIKPEN) 100 UNIT/ML pen  insulin pen " "needle (BD HENRI U/F) 32G X 4 MM  ondansetron (ZOFRAN ODT) 4 MG ODT tab  Prenatal Vit-Fe Fumarate-FA (PRENATAL MULTIVITAMIN W/IRON) 27-0.8 MG tablet  pyridOXINE (VITAMIN B6) 25 MG tablet      No Known Allergies  Family History  Family History   Problem Relation Age of Onset     Diabetes No family hx of         type 1 diabetes or autoimmunity     Social History   Social History     Tobacco Use     Smoking status: Never     Smokeless tobacco: Never   Substance Use Topics     Alcohol use: Yes     Drug use: Yes     Types: Marijuana         A medically appropriate review of systems was performed with pertinent positives and negatives noted in the HPI, and all other systems negative.    Physical Exam   BP: 112/72  Pulse: (!) 133  Temp: 98  F (36.7  C)  Resp: 18  Height: 149.9 cm (4' 11\")  Weight: 49.9 kg (110 lb)  SpO2: 99 %  Physical Exam  Physical Exam   Constitutional:   well nourished, well developed, appears uncomfortable  HENT:   Head: Normocephalic and atraumatic.   Eyes: Conjunctivae are normal. Pupils are equal, round, and reactive to light.    pharynx has no erythema or exudate, mucous membranes are dry  Neck:   no adenopathy, no bony tenderness  Cardiovascular: tachycardia without murmurs or gallops  Pulmonary/Chest: Clear to auscultation bilaterally, with no wheezes or retractions. No respiratory distress.  GI: Soft with good bowel sounds.  Diffuse abdominal tenderness non-distended, with no guarding, no rebound, no peritoneal signs.   Back:  No bony or CVA tenderness   Musculoskeletal:  no edema  Skin: Skin is warm and dry. No rash noted.   Neurological: alert and oriented to person, place, and time. Nonfocal exam  Psychiatric:  normal mood and affect.     ED Course, Procedures, & Data      Procedures              EKG Interpretation:      Interpreted by Sheyla Miller MD  Time reviewed:1015 am   Symptoms at time of EKG: see hpi   Rhythm: Normal sinus  and Sinus tachycardia  Rate: 100-110  Axis: " Normal  Ectopy: None  Conduction: Normal  ST Segments/ T Waves: No acute ischemic changes  Q Waves: None  Comparison to prior: Unchanged from 1/14/2023    Clinical Impression: Sinus tachycardia, rate of 107 bpm with left atrial enlargement and no acute ischemic             Results for orders placed or performed during the hospital encounter of 02/18/23   [place] Draw     Status: None (In process)    Narrative    The following orders were created for panel order [place] Draw.  Procedure                               Abnormality         Status                     ---------                               -----------         ------                     Extra Blue Top Tube[[id number]]                                                         Extra Red Top Tube[[id number]]                               In process                 Extra Green Top (Lithium...[[id number]]                      In process                 Extra Purple Top Tube[[id number]]                            Final result                 Please view results for these tests on the individual orders.   Extra Purple Top Tube     Status: None   Result Value Ref Range    Hold Specimen [facility]    Comprehensive metabolic panel     Status: Abnormal   Result Value Ref Range    Sodium 134 (L) 136 - 145 mmol/L    Potassium 4.7 3.4 - 5.3 mmol/L    Chloride 100 98 - 107 mmol/L    Carbon Dioxide (CO2) 15 (L) 22 - 29 mmol/L    Anion Gap 19 (H) 7 - 15 mmol/L    Urea Nitrogen 10.2 6.0 - 20.0 mg/dL    Creatinine 0.74 0.51 - 0.95 mg/dL    Calcium 9.7 8.6 - 10.0 mg/dL    Glucose 315 (H) 70 - 99 mg/dL    Alkaline Phosphatase 190 (H) 35 - 104 U/L    AST 16 10 - 35 U/L    ALT 10 10 - 35 U/L    Protein Total 7.9 6.4 - 8.3 g/dL    Albumin 4.0 3.5 - 5.2 g/dL    Bilirubin Total 0.3 <=1.2 mg/dL    GFR Estimate >90 >60 mL/min/1.73m2   Lipase     Status: Normal   Result Value Ref Range    Lipase 21 13 - 60 U/L   Lactic acid whole blood     Status: Normal   Result Value Ref Range    Lactic Acid 0.9  0.7 - 2.0 mmol/L   Ketone Beta-Hydroxybutyrate Quantitative     Status: Abnormal   Result Value Ref Range    Ketone (Beta-Hydroxybutyrate) Quantitative 2.60 (HH) <=0.30 mmol/L   CBC with platelets and differential     Status: Abnormal   Result Value Ref Range    WBC Count 9.2 4.0 - 11.0 10e3/uL    RBC Count 4.91 3.80 - 5.20 10e6/uL    Hemoglobin 12.9 11.7 - 15.7 g/dL    Hematocrit 41.8 35.0 - 47.0 %    MCV 85 78 - 100 fL    MCH 26.3 (L) 26.5 - 33.0 pg    MCHC 30.9 (L) 31.5 - 36.5 g/dL    RDW 15.7 (H) 10.0 - 15.0 %    Platelet Count 264 150 - 450 10e3/uL    % Neutrophils 71 %    % Lymphocytes 22 %    % Monocytes 7 %    % Eosinophils 0 %    % Basophils 0 %    % Immature Granulocytes 0 %    NRBCs per 100 WBC 0 <1 /100    Absolute Neutrophils 6.5 1.6 - 8.3 10e3/uL    Absolute Lymphocytes 2.0 0.8 - 5.3 10e3/uL    Absolute Monocytes 0.7 0.0 - 1.3 10e3/uL    Absolute Eosinophils 0.0 0.0 - 0.7 10e3/uL    Absolute Basophils 0.0 0.0 - 0.2 10e3/uL    Absolute Immature Granulocytes 0.0 <=0.4 10e3/uL    Absolute NRBCs 0.0 10e3/uL   CBC with platelets differential     Status: Abnormal    Narrative    The following orders were created for panel order CBC with platelets differential.  Procedure                               Abnormality         Status                     ---------                               -----------         ------                     CBC with platelets and d...[[id number]]  Abnormal            Final result                 Please view results for these tests on the individual orders.     Medications   dextrose 10% infusion (has no administration in time range)   insulin 1 unit/mL in saline (NovoLIN, HumuLIN Regular) drip - ADULT IV Infusion (has no administration in time range)   glucose gel 15-30 g (has no administration in time range)     Or   dextrose 50 % injection 25-50 mL (has no administration in time range)     Or   glucagon injection 1 mg (has no administration in time range)   0.9% sodium chloride  BOLUS (0 mLs Intravenous Stopped 2/18/23 0819)   ondansetron (ZOFRAN) injection 4 mg (4 mg Intravenous Given 2/18/23 0717)     Labs Ordered and Resulted from Time of ED Arrival to Time of ED Departure   COMPREHENSIVE METABOLIC PANEL - Abnormal       Result Value    Sodium 134 (*)     Potassium 4.7      Chloride 100      Carbon Dioxide (CO2) 15 (*)     Anion Gap 19 (*)     Urea Nitrogen 10.2      Creatinine 0.74      Calcium 9.7      Glucose 315 (*)     Alkaline Phosphatase 190 (*)     AST 16      ALT 10      Protein Total 7.9      Albumin 4.0      Bilirubin Total 0.3      GFR Estimate >90     KETONE BETA-HYDROXYBUTYRATE QUANTITATIVE, RAPID - Abnormal    Ketone (Beta-Hydroxybutyrate) Quantitative 2.60 (*)    CBC WITH PLATELETS AND DIFFERENTIAL - Abnormal    WBC Count 9.2      RBC Count 4.91      Hemoglobin 12.9      Hematocrit 41.8      MCV 85      MCH 26.3 (*)     MCHC 30.9 (*)     RDW 15.7 (*)     Platelet Count 264      % Neutrophils 71      % Lymphocytes 22      % Monocytes 7      % Eosinophils 0      % Basophils 0      % Immature Granulocytes 0      NRBCs per 100 WBC 0      Absolute Neutrophils 6.5      Absolute Lymphocytes 2.0      Absolute Monocytes 0.7      Absolute Eosinophils 0.0      Absolute Basophils 0.0      Absolute Immature Granulocytes 0.0      Absolute NRBCs 0.0     LIPASE - Normal    Lipase 21     LACTIC ACID WHOLE BLOOD - Normal    Lactic Acid 0.9     ROUTINE UA WITH MICROSCOPIC REFLEX TO CULTURE   INFLUENZA A/B & SARS-COV2 PCR MULTIPLEX   BLOOD GAS VENOUS   GLUCOSE MONITOR NURSING POCT   HCG QUALITATIVE URINE POCT     No orders to display          Medical Decision Making  The patient's presentation is strongly suggestive of a chronic illness severe exacerbation, progression, or side effect of treatment.    The patient's evaluation involved:  review of external note(s) from 3+ sources ([place] [facility] [facility]  ordering and/or review of 3+ test(s) in this  encounter (see separate area of note for details)  review of 3+ test result(s) ordered prior to this encounter (Laboratory studies and several previous visits)    The patient's management involved prescription drug management (including medications given in the ED), drug therapy requiring intensive monitoring (insulin drip) and a decision regarding hospitalization.      Assessment & Plan        I have reviewed the nursing notes.   Emergency Department course:  The patient was seen and examined at 0702 am.    Per chart review, patient [place] [facility] [place], 2/16/2023.  Her blood glucose was high, thought to be early DKA, and she was having ongoing diarrhea despite having treatment for C. difficile.  The patient felt that she could manage her blood glucose at home and preferred not to be admitted at that time.  At time of discharge, she was vitally stable and her blood sugars had gradually improved.  She planned to follow-up with her PCP and get in touch with her endocrinologist.  Earlier this month on 02/01/2023 she was also prescribed vancomycin for C. difficile for 14 days.      I treated the patient with a normal saline bolus IV and Zofran IV  Laboratory studies show multiple abnormalities.  The patient has elevated ketones beta hydroxybutyrate of 2.6.  Comprehensive metabolic panel shows hyperglycemia, with a glucose of 315.  Carbon dioxide is low at 15 and anion gap is elevated at 19, concerning for DKA.  Lipase is normal at 21.  CBC shows a normal hemoglobin of 12.9 and WBC of 9.2.  Lactate is normal at 0.9  Hemoglobin A1C is elevated at 11.4. on 1/15/2023.  EKG shows a sinus tachycardia, rate of 107 bpm with left atrial enlargement and no acute ischemic changes    After laboratory studies resulted, I placed the patient on an insulin drip.  She remains tachycardic on reevaluation at 9:20 AM.  Myriam Wylie is a 22 year old female who presents with diarrhea and vomiting and concerns for DKA.   Laboratory studies suggest the patient is in mild DKA.  She continues to be tachycardic.  She has been having diarrhea since her C. difficile diagnosis a month and a half ago.  She has been vomiting for the last few days.  She will be admitted here to the medicine service for IV treatment and further evaluation   I have reviewed the findings, diagnosis, plan and need for follow up with the patient.    New Prescriptions    No medications on file       Final diagnoses:   Diabetic ketoacidosis without coma associated with type 1 diabetes mellitus (H)   Diarrhea, unspecified type     This note was created in part by the use of Dragon voice recognition dictation system. Inadvertent grammatical errors and typographical errors may still exist.  MD Sheyla Reeves  [facility] EMERGENCY DEPARTMENT  2/18/2023     Sheyla Miller MD  02/18/23 6635

## 2023-02-18 NOTE — PHARMACY-ADMISSION MEDICATION HISTORY
Admission Medication History Completed by Pharmacy    See [facility] Admission Navigator for allergy information, preferred outpatient pharmacy, prior to admission medications and immunization status.     Medication History Sources:     Patient    Dispense Hx    Care Everywhere    Changes made to PTA medication list (reason):    Added:   o Ashwagandha 500mg capsule PO daily (per pt)  o Fenugreek 500mg capsule PO daily (per pt)  o Debo 500mg capsule PO daily (per pt)  o Culturelle probiotic & prebiotic chewable tablet daily (per pt)      Deleted:  o Flonase nasal spray (was using for skin reactions to FreeStyle Aileen but no longer using Aileen, per pt)  o Vitamin B6 (was using during pregnancy but no longer needs, per pt)      Changed:  o Novolog pen (1-5 units --> 1-10 units subcutaneous 4 times daily with meals & nightly, using correction of 1 unit per 50mg/dL over 150mg/dL, per pt)    Additional Information:    Patient knew medication history well.    Pt last needed glucagon 2 months ago and needs refill    Pt's Lantus is 15 units daily, and Novolog regimen is now 1-10 units 4 times daily and sliding scale changed to 1 unit per 50mg/dL over 150mg/dL    Pt completed 10-day PO vanco on 1/25/23 for C. Diff, then completed a 14-day PO vanco regimen on 2/14/23 - patient's symptoms of diarrhea and abdominal cramping have not resolved (per patient & Care Everywhere)    Prior to Admission medications    Medication Sig Last Dose Taking? Auth Provider Long Term End Date   Ashwagandha 500 MG CAPS Take 500 mg by mouth daily 2/17/2023 at am Yes Unknown, Entered By History     Fenugreek 500 MG CAPS Take 500 mg by mouth daily 2/17/2023 at am Yes Unknown, Entered By History     Glucagon 0.5 MG/0.1ML SOSY Inject 1 ampule Subcutaneous daily as needed More than a month Yes Jodie Delgado MD Yes    insulin aspart (NOVOLOG PEN) 100 UNIT/ML pen Inject 1-5 Units Subcutaneous 4 times daily (with meals and nightly)  Patient  taking differently: Inject 1-10 Units Subcutaneous 4 times daily (with meals and nightly) ISF: 1 unit per 50 > 150, For -199 give 1 unit. For -249 give 2 units. For -299 give 3 units. For -349 give 4 units. For BG = or > 350 give 5 units. 2/17/2023 at pm Yes Perla La MD Yes    insulin glargine (BASAGLAR KWIKPEN) 100 UNIT/ML pen Inject 14 Units Subcutaneous daily At noon  Patient taking differently: Inject 15 Units Subcutaneous daily At noon 2/17/2023 at 1200 Yes Jodie Delgado MD Yes    Debo 500 MG CAPS Take 500 mg by mouth daily 2/17/2023 at am Yes Unknown, Entered By History     ondansetron (ZOFRAN ODT) 4 MG ODT tab Take 4 mg by mouth every 6 hours as needed 2/17/2023 at prn Yes Reported, Patient No    Prenatal Vit-Fe Fumarate-FA (PRENATAL MULTIVITAMIN W/IRON) 27-0.8 MG tablet Take 1 tablet by mouth daily 2/17/2023 at am Yes Jodie Delgado MD     Probiotic Product (ACIDOPHILUS) CHEW Take 1 tablet by mouth daily 2/17/2023 at am Yes Unknown, Entered By History     acetone, Urine, test STRP 1 strip by In Vitro route as needed   Manisha Mcarthur MD     blood glucose monitoring (ACCU-CHEK FASTCLIX) lancets Use to test blood sugar 6 times daily or as directed.   Marcia Elizabeth MD     blood glucose monitoring (ACCU-CHEK HENRI SMARTVIEW) meter device kit Use to test blood sugar 6 times daily or as directed.   Marcia Elizabeth MD     blood glucose monitoring (ACCU-CHEK SMARTVIEW) test strip Use to test blood sugar 6 times daily or as directed.   Marcia Elizabeth MD No    insulin pen needle (BD HENRI U/F) 32G X 4 MM Use 6 pen needles daily or as directed.   Marcia Elizabeth MD         Date completed: 02/18/23    Medication history completed by: Ema Vega

## 2023-02-18 NOTE — CONSULTS
Inpatient Diabetes Consult    Chief Complaint Nausea and vomiting       Assessment and plan:  Myriam Wylie is a 22 year old female with PMHx of  type 1 diabetes, h/o clostridium difficile presents with high blood sugars, anion gap, positive ketones and metabolic acidosis most concerning for diabetic ketoacidosis.     Type 1 DM  Diabetic ketoacidosis    Diarrhea sometimes can be related to hyperthyroid, TSH is normal in 1/2023, reassuring. Need to look for radha disease if persists    - Continue insulin drip DKA protocol  - Give lantus 8 units on top of drip now  - Lantus 8 units twice daily, instead of once daily to have basal coverage all the time given multiple DKA  - Plan to stop Insulin drip when DKA resolves  - BG checkq1 hr  - We will plan to switch to her home insulin regimen when off drip      Patient is seen , examined and discussed with Dr.Chow Tere Cifuentes MD  Endocrinology fellow  Page number: [phone]    I have seen and examined the patient and agree with the fellow's plan of care as noted.  February 18, 2023    Dr. Isamar Mc [phone]    =====================================================================      History of Present Illness  Myriam Wyile is a 22 year old female with PMHx of type 1 diabetes, h/o clostridium difficile presented with nausea, vomiting and vague malaise, Patient mentions her symptoms have been ongoing for the last 4-5 days, She  Had [place] with [place] [facility] [place] but did not want to get admitted at that time, Patient mentions her symptoms are worse with activity and food consumption and are better with rest and oral hydration, She does endorse vague fatigue and some loose stools, No yellowish discoloration of the skin, No fevers or other symptoms.     Recent Labs   Lab 02/18/23  1128 02/18/23  1003 02/18/23  0616   * 402* 315*         Diabetes Type:  Type 1 Diabetes  Diabetes Duration: age 4 -  3/28/2005  Usual Diabetes Regimen:   Lantus  15 unit(s) daily at 1400  Novolog 1 per 10 g cho lunch and dinner/ snacks,  1 per 7 g cho for breakfast   Novolog sliding scale insulin has remained 1:50>150 mg/dL     BG monitoring frequency:  Now using fingersticks, 2-3 times per day  Bg ranging from 100-200s    Ability to [place] Prescribed Regimen: yes  Diabetes Control:   Lab Results   Component Value Date    A1C 11.4 01/15/2023    A1C 10.9 07/27/2022    A1C 9.8 06/02/2022    A1C 8.7 12/23/2021    A1C 9.5 04/17/2021    A1C 9.7 10/11/2019    A1C 11.3 09/15/2019    A1C 12.3 12/09/2018    A1C 11.7 12/08/2018     Diabetes Complications: none as reported by patient  History of DKA: yes, multiple  Able to Detect Hypoglycemia: yes, <75s  Usual Diabetes Care Provider: recently shifted to adult endocrinologist at  Endocrinology  Factors Impacting Glucose Control: infection    Current Inpatient DM regimen:  Insulin drip - DKA        Review of Systems  10 point ROS completed with pertinent positives and negatives noted in the HPI    Past medical, family and social histories are reviewed and updated.    Past Medical History  Past Medical History:   Diagnosis Date     C. difficile colitis      Diabetes type 1, uncontrolled      DKA (diabetic ketoacidosis) (H)      PID (acute pelvic inflammatory disease)        Family History  Family History   Problem Relation Age of Onset     Diabetes No family hx of         type 1 diabetes or autoimmunity       Social History  Social History     Socioeconomic History     Marital status: Single     Spouse name: None     Number of children: None     Years of education: None     Highest education level: None   Tobacco Use     Smoking status: Never     Smokeless tobacco: Never   Substance and Sexual Activity     Alcohol use: Yes     Drug use: Yes     Types: Marijuana     Sexual activity: Yes     Partners: Female, Male     Birth control/protection: None     Comment: h/o PID   Social History Narrative    Myriam lives with her mother and  step father.  She reports that she has 8 siblings on her mother's side and 11 on her father's side, all half siblings.  She is in the 7th grade after being held back a couple years ago due to missing so much school.  She is a good student, however, currently getting all A's. Favorite subject is math.  In the future she wants to be a , a shen or an [employer] agent.        Children's may be getting Child Protection involved.        Dec 2015--this is a child protection case.  Mom and Dad both here today.  Dad and Clarice blame each other for her not getting her cares done, mom says she used to take care of Clarice's diabetes but hasn't been because she works.  Says she is now going to start doing so.        Jan 2016-excited about her new phone.  Mom gave it to her with the condition that she test 4x/day but thusfar this isn't happening.        March 2016-wants to start volleyball. Still an open child protection case.        May 2016.  Clarice is in a group home, which she hates.  There is concern that she is manipulating her blood glucose levels to try to get out of the group home.        June 2016. Clarice has been at [facility] 2 months.  She wants to go home.  Glucose control has been steadily improving while she is there, so the structure has been good for her.        Sept 2016.  In a new foster home which she likes (this woman has previously done respite care for her), but it can only last until early Oct when this woman goes out of town.  She was diagnosed with an eating disorder and has started [facility].        October 2016.  Still in the foster home she was in last month ago but it is not clear how involved this woman is with her diabetes.  She is going home for a week tomorrow while the foster mom is on vacation.  Now it has been determined (as I have all along maintained) that she does not have an eating disorder.        Dec 2016. Back with Mom.  They don't have a place of their own yet so they are  "staying with a friend of Mom in a 1 bedroom apartment in [place].  They sleep on the couch pull-out.  Mom drops Clarice off at school on her way to work. Clarice says kids in school are mean to her.        Feb 2017. Out of strips because of confusing insurance issue.  For some reason she is on [employer] for this month only but then March 1 goes to [employer] but then [place] go back to [place]. The problem comes up because each plan allows different meters and strips.  She is doing a dance program after school twice a week and loves it.        April 2017. Still open child protection case. I have been in contact with the guardian lisa murphy. She is on spring break, going into work each day with Mom at [email or web address].        May 2017. Got her new insulin pump on Tuesday May 2, excited about it.  Dance performances coming up.        June 2017--home with Mom, child protection still involved.  School just ended (8th grade).  No particular plans for summer but will be home alone and with her friends.  Plans to dance, walk and swim.        July 2017--Child protection still involved. Not really doing anything this summer but sleeping during the day and up on the computer at night.  Trying to decide between 3 high schools, all of which have accepted her.        August 2017. 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  Dance class a couple times a week, otherwise no exercise. Happy to be back on the pump.        Oct 2017.  Excited about performance she will have in January.        Nov 2017.  Mom is working long hours 6 days a week. They are trying to buy a house in [place].        Jan 2018.  Danny is enjoying school and is very excited because she is playing the lead in a play at the [employer] Jan 20 and 21.         Physical Examination:  Vital signs:  Temp: 98.6  F (37  C) Temp src: Oral BP: 130/72 Pulse: 118   Resp: 17 SpO2: 100 % O2 Device: None (Room air)   Height: 149.9 cm (4' 11\") Weight: 49.9 " "kg (110 lb)  Estimated body mass index is 22.22 kg/m  as calculated from the following:    Height as of this encounter: 1.499 m (4' 11\").    Weight as of this encounter: 49.9 kg (110 lb).    General: pleasant, in no distress.   HEENT: normocephalic, atraumatic. Oral mucous membranes moist.   Lungs: unlabored respiration, no cough  ABD: rounded, nondistended  Skin: warm and dry, no obvious lesions  MSK:  moves all extremities  Lymp:  no LE edema   Mental status:  alert, oriented to self, place, time  Psych:   calm and appropriate interaction     Laboratory  Recent Labs   Lab Test 02/18/23  1128 02/18/23  1003 02/18/23  0616 02/01/23  0910   NA  --   --  134* 138   POTASSIUM  --   --  4.7 4.2   CHLORIDE  --   --  100 103   CO2  --   --  15* 21*   ANIONGAP  --   --  19* 14   * 402* 315* 312*   BUN  --   --  10.2 10.4   CR  --   --  0.74 0.66   LILIANA  --   --  9.7 8.8     CBC RESULTS:   Recent Labs   Lab Test 02/18/23  0616   WBC 9.2   RBC 4.91   HGB 12.9   HCT 41.8   MCV 85   MCH 26.3*   MCHC 30.9*   RDW 15.7*          Liver Function Studies -   Recent Labs   Lab Test 02/18/23  0616   PROTTOTAL 7.9   ALBUMIN 4.0   BILITOTAL 0.3   ALKPHOS 190*   AST 16   ALT 10       Active Medications  Current Facility-Administered Medications   Medication     0.45% sodium chloride + KCl 20 mEq/L infusion     glucose gel 15-30 g    Or     dextrose 50 % injection 25-50 mL    Or     glucagon injection 1 mg     heparin ANTICOAGULANT injection 5,000 Units     insulin 1 unit/1mL in saline (NovoLIN, HumuLIN Regular) drip - DKA algorithm     Current Outpatient Medications   Medication     acetone, Urine, test STRP     blood glucose monitoring (ACCU-CHEK FASTCLIX) lancets     blood glucose monitoring (ACCU-CHEK HENRI SMARTVIEW) meter device kit     blood glucose monitoring (ACCU-CHEK SMARTVIEW) test strip     fluticasone (FLONASE) 50 MCG/ACT nasal spray     Glucagon 0.5 MG/0.1ML SOSY     insulin aspart (NOVOLOG PEN) 100 UNIT/ML pen "     insulin aspart (NOVOLOG PEN) 100 UNIT/ML pen     insulin glargine (BASAGLAR KWIKPEN) 100 UNIT/ML pen     insulin pen needle (BD HENRI U/F) 32G X 4 MM     ondansetron (ZOFRAN ODT) 4 MG ODT tab     Prenatal Vit-Fe Fumarate-FA (PRENATAL MULTIVITAMIN W/IRON) 27-0.8 MG tablet     pyridOXINE (VITAMIN B6) 25 MG tablet     Current Outpatient Medications   Medication Sig Dispense Refill     acetone, Urine, test STRP 1 strip by In Vitro route as needed 50 each 1     blood glucose monitoring (ACCU-CHEK FASTCLIX) lancets Use to test blood sugar 6 times daily or as directed. 2 Box 6     blood glucose monitoring (ACCU-CHEK HENRI SMARTVIEW) meter device kit Use to test blood sugar 6 times daily or as directed. 2 kit 6     blood glucose monitoring (ACCU-CHEK SMARTVIEW) test strip Use to test blood sugar 6 times daily or as directed. 200 each 6     fluticasone (FLONASE) 50 MCG/ACT nasal spray Spray 2 sprays into both nostrils daily SHAKE LIQUID AND USE       Glucagon 0.5 MG/0.1ML SOSY Inject 1 ampule Subcutaneous daily as needed 1 mL 0     insulin aspart (NOVOLOG PEN) 100 UNIT/ML pen Inject 1-5 Units Subcutaneous 4 times daily (with meals and nightly) 15 mL 1     insulin aspart (NOVOLOG PEN) 100 UNIT/ML pen Inject 1-5 Units Subcutaneous At Bedtime 15 mL 0     insulin glargine (BASAGLAR KWIKPEN) 100 UNIT/ML pen Inject 14 Units Subcutaneous daily At noon (Patient taking differently: Inject 15 Units Subcutaneous daily At noon) 15 mL 0     insulin pen needle (BD HENRI U/F) 32G X 4 MM Use 6 pen needles daily or as directed. 200 each 6     ondansetron (ZOFRAN ODT) 4 MG ODT tab Take 4 mg by mouth every 6 hours as needed       Prenatal Vit-Fe Fumarate-FA (PRENATAL MULTIVITAMIN W/IRON) 27-0.8 MG tablet Take 1 tablet by mouth daily 90 tablet 3     pyridOXINE (VITAMIN B6) 25 MG tablet Take 1 tablet (25 mg) by mouth 3 times daily 90 tablet 0       Current Diet  Orders Placed This Encounter      NPO for Medical/Clinical Reasons Except for:  Meds, Ice Chips

## 2023-02-18 NOTE — CONSULTS
[facility]SULT[facility]     Patient:  Myriam Wylie   Date of birth 2000, Medical record number [id number]  Date of Visit:  02/18/2023  Date of Admission: 2/18/2023  Consult Requested by:Pascual Marmolejo MD  Reason for Consult:  second episode of C.diff          Assessment and Recommendations:     Myriam Wylie is a 22 year old female with PMH of type 1 diabetes mellitus (HbA1c 11.4), h/o clostridium difficile 2016, 1/14/23,  presented to ED on 2/18/23 with nausea, vomiting and tiredness with uncontrolled diabetes, diagnosed with Diabetic ketoacidosis.  on going sore throat, sinus congestion , epigastric pain with diarrhea. ID is consulted for C.diff diarrhea     1.    Diarrhea  - History of C.diff ( 2016. 1/14/23)   - per patient she took Vancomycin x 10 days then 14 days , completed 4 days ago. without signicant improvement  - per patient, her mother has frequent diarrhea    - DDX : on going epigastric pain with frequent Ibuprofen related / DKA / viral illness/ C.diff     2.    Upper respiratory infection with post nasal drip cough   - neg for COVID19, Influenza A/B     3.    Diabetes mellitus type 1 with DKA   4.    Epigastric pain with nausea, vomiting   - hx of frequent NSAID use      RECOMMENDATION:  - check stools for C.diff   - check respiratory viral panel  - antihistamine for sinus congestion   - may start empiric Vancomycin 125 mg po BID   - fluid hydration / consider PPI for epigastric pain     Plan discussed with Dr Marmolejo    ID will follow     Thank you for allowing us to participate in the care of this patient    Mahesh Masters MD, M.Med.Sc  Staff, Infectious Diseases   Pager : [phone]         History of Present Illness:     Myriam Wylie is a 22 year old female with PMH of type 1 diabetes mellitus (HbA1c 11.4), h/o clostridium difficile 2016, 1/14/23,  presented to ED on 2/18/23 with nausea, vomiting and tiredness with uncontrolled diabetes,  diagnosed with Diabetic ketoacidosis.      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 She has been having sore throat, rhinorrhea with sinus congestion for the past 1 week. [place] and [place] [facility] [place] and tested negative for COVID19, Infulenza A/B, Strep A , Ur pregnancy . UA positive was positive  for glycosuria without signs of UTI  Blood glucose was >600. She did not want to get admitted at that time.     ID is consulted for recurrent diarrhea. She was diagnosed with C.diff in 2016 after antibiotic use. She says she was hospitalized for 2 weeks then. On 1/14/23, she was diagnosed with C.diff ( she denies any antibiotics use prior to this) and took Vancomycin x 10 days initially  but due to persistent diarrhea, she took Vancomycin for another 14 days. she completed the 14 days of treatment 4 days ago. She still complains of frequent diarrhea yestedra. today, she had 1 x diarrhea this morning. stool is yellow color, not bloody, not mucusy.   her abdominal pain is in the epigastric region, not in the lower abdomen. She has been taking Ibuprofen 400 mg 2x/daily as needed for frequent migraine headaches, stress induced. She denies fever, chills, sweats . Has fair appetite.     Her blood glucose is uncontrolled, she attributes this to her upper respiratory infection.    Her WBC has been normal, afebrile, with hemodynamicably stable.    Recent culture results include:  Culture Micro   Date Value Ref Range Status   04/17/2021 No growth  Final   04/17/2021 No growth  Final   04/17/2021 10,000 to 50,000 colonies/mL  Escherichia coli   (A)  Final   04/17/2021   Final    <10,000 colonies/mL  urogenital raquel  Susceptibility testing not routinely done     10/11/2019 >100,000 colonies/mL  Klebsiella pneumoniae   (A)  Final   10/11/2019   Final    <10,000 colonies/mL  mixed urogenital raquel  Susceptibility testing not routinely done     09/20/2019 No growth  Final   09/20/2019   Final    <10,000  colonies/mL  urogenital raquel  Susceptibility testing not routinely done     09/20/2019 No growth  Final   09/17/2019 No growth  Final          Review of Systems:   CONSTITUTIONAL:  No fevers or chills  EYES: negative for icterus  ENT:  see HPI  RESPIRATORY:  negative for cough with sputum and dyspnea  CARDIOVASCULAR:  negative for chest pain, dyspnea  GASTROINTESTINAL:  see HPI  GENITOURINARY:  negative for dysuria  HEME:  No easy bruising  INTEGUMENT:  negative for rash and pruritus  NEURO:  see HPI         Past Medical History:     Past Medical History:   Diagnosis Date     C. difficile colitis      Diabetes type 1, uncontrolled      DKA (diabetic ketoacidosis) (H)      PID (acute pelvic inflammatory disease)           Past Surgical History:     Past Surgical History:   Procedure Laterality Date     COLONOSCOPY N/A 9/18/2019    Procedure: COLONOSCOPY;  Surgeon: Jeremi Banks MD;  Location: [facility]      ESOPHAGOSCOPY, GASTROSCOPY, DUODENOSCOPY (EGD), COMBINED N/A 9/18/2019    Procedure: Upper endoscopy and colonoscopy with biopsy;  Surgeon: Jeremi Banks MD;  Location: [facility]             Family History:     Family History   Problem Relation Age of Onset     Diabetes No family hx of         type 1 diabetes or autoimmunity          Social History:     Social History     Tobacco Use     Smoking status: Never     Smokeless tobacco: Never   Substance Use Topics     Alcohol use: Yes     History   Sexual Activity     Sexual activity: Yes     Partners: Female, Male     Birth control/ protection: None     Comment: h/o PID          Current Medications (antimicrobials listed in bold):       heparin ANTICOAGULANT  5,000 Units Subcutaneous Q12H            Allergies:   No Known Allergies         Physical Exam:   Vitals were reviewed  Patient Vitals for the past 24 hrs:   BP Temp Temp src Pulse Resp SpO2 Height Weight   02/18/23 1230 -- -- -- (!) 126 11 -- -- --   02/18/23 1100 130/72 98.6  F (37  C) Oral 118  "17 -- -- --   02/18/23 1022 (!) 144/84 -- -- 108 11 100 % -- --   02/18/23 0800 (!) 128/94 -- -- 109 14 99 % -- --   02/18/23 0715 -- -- -- 104 20 99 % -- --   02/18/23 0600 122/88 -- -- -- -- -- -- --   02/18/23 0548 112/72 98  F (36.7  C) Oral (!) 133 18 99 % 1.499 m (4' 11\") 49.9 kg (110 lb)     Ranges for his vital signs:  Temp:  [98  F (36.7  C)-98.6  F (37  C)] 98.6  F (37  C)  Pulse:  [104-133] 126  Resp:  [11-20] 11  BP: (112-144)/(72-94) 130/72  SpO2:  [99 %-100 %] 100 %    Physical Examination:  GENERAL:  well-developed, well-nourished, in bed in no acute distress. sinus congestion.   HEENT:  Head is normocephalic, atraumatic   EYES:  Eyes have anicteric sclerae without conjunctival injection or stigmata of endocarditis.    ENT:  Oropharynx is moist without exudates or ulcers. Tongue is midline. no sinus tenderness.   NECK:  Supple. No  Cervical lymphadenopathy  LUNGS:  Clear to auscultation bilateral.   CARDIOVASCULAR:  Regular rate and rhythm with no murmurs, gallops or rubs.  ABDOMEN:  Normal bowel sounds, soft, mildly tender in epigastrium. not distended. No appreciable hepatosplenomegaly  SKIN:  No acute rashes.  Line(s) are in place without any surrounding erythema or exudate. No stigmata of endocarditis.  NEUROLOGIC:  Grossly nonfocal. Active x4 extremities         Laboratory Data:     Inflammatory Markers    Recent Labs   Lab Test 04/18/21  0620 04/17/21  1448 09/18/19  1020 09/17/19  1044 09/15/19  1328 12/13/18  0800 12/12/18  0757 12/11/18  0716   SED  --  74*  --  77*  --   --   --   --    .0* 180.0* 272.0* 213.0* 321.0* 59.8* 98.6* 228.0*       Hematology Studies    Recent Labs   Lab Test 02/18/23  0616 02/01/23  0910 01/15/23  0428 01/14/23  1409 01/09/23  1757 10/31/22  1326 12/23/21  2251 04/18/21  0620 04/17/21  1448 04/17/21  0046 10/11/19  1001 10/11/19  0954 09/18/19  1020 09/17/19  1044   WBC 9.2 3.6* 7.1 7.7 8.8 11.1*   < > 7.6 8.9 8.5 12.3*  --  8.6 3.7*   ANEU  --   --   --   " --   --   --   --  4.1 6.9 6.5 10.8*  --  6.0 3.1   AEOS  --   --   --   --   --   --   --  0.1 0.0 0.0 0.0  --  0.0 0.0   HGB 12.9 11.8 10.4* 13.2 11.1* 11.8   < > 9.3* 10.7* 10.3* 11.3*   < > 9.9* 9.6*   MCV 85 87 85 84 85 84   < > 87 84 83 80  --  80 85    256 241 291 241 215   < > 201 224 239 263  --  231 194    < > = values in this interval not displayed.     Metabolic Studies     Recent Labs   Lab Test 02/18/23  0616 02/01/23  0910 01/15/23  0605 01/14/23  1818 01/14/23  1409   * 138 138 139 139   POTASSIUM 4.7 4.2 3.8 4.3 3.8   CHLORIDE 100 103 105 106 104   CO2 15* 21* 18* 18* 17*   BUN 10.2 10.4 4.1* 5.0* 5.8*   CR 0.74 0.66 0.60 0.51 0.54   GFRESTIMATED >90 >90 >90 >90 >90     Hepatic Studies    Recent Labs   Lab Test 02/18/23  0616 02/01/23  0910 01/15/23  0605 01/14/23  1409 01/09/23  1757 10/31/22  1326   BILITOTAL 0.3 0.3 0.3 0.4 0.2 0.5   ALKPHOS 190* 155* 131* 167* 161* 167*   ALBUMIN 4.0 4.1 3.2* 4.3 3.9 3.9   AST 16 18 21 26 19 18   ALT 10 16 14 17 18 <5*     Thyroid Studies    Recent Labs   Lab Test 01/01/22 2011 11/30/17  1216 12/08/16  0856   TSH 0.67 0.99 0.04*   T4  --  0.92 1.09     Microbiology:  Culture Micro   Date Value Ref Range Status   04/17/2021 No growth  Final   04/17/2021 No growth  Final   04/17/2021 10,000 to 50,000 colonies/mL  Escherichia coli   (A)  Final   04/17/2021   Final    <10,000 colonies/mL  urogenital raquel  Susceptibility testing not routinely done     10/11/2019 >100,000 colonies/mL  Klebsiella pneumoniae   (A)  Final   10/11/2019   Final    <10,000 colonies/mL  mixed urogenital raquel  Susceptibility testing not routinely done     09/20/2019 No growth  Final   09/20/2019   Final    <10,000 colonies/mL  urogenital raquel  Susceptibility testing not routinely done     09/20/2019 No growth  Final   09/17/2019 No growth  Final   09/15/2019 (A)  Final    Cultured on the 4th day of incubation:  Klebsiella pneumoniae     09/15/2019   Final    Critical  Value/Significant Value, preliminary result only, called to and read back by  Shamika Desir RN 9/19/19 @1634      09/15/2019   Final    (Note)  POSITIVE for KLEBSIELLA PNEUMONIAE by Prosetta multiplex nucleic acid  test. Final identification and antimicrobial susceptibility testing  will be verified by standard methods.    Specimen tested with [employer] multiplex, gram-positive blood culture  nucleic acid test for the following targets: Staph aureus, Staph  epidermidis, Staph lugdunensis, other Staph species, Enterococcus  faecalis, Enterococcus faecium, Streptococcus species, S. agalactiae,  S. anginosus grp., S. pneumoniae, S. pyogenes, Listeria sp., mecA  (methicillin resistance) and Monique/B (vancomycin resistance).    Critical Value/Significant Value called to and read back by  Shamika Metzger RN @ 1850. 9/19/19. AMV     12/09/2018 No growth  Final   04/03/2017   Final    10,000 to 50,000 colonies/mL mixed urogenital raquel   04/03/2017 (A)  Final    Moderate growth Beta hemolytic Streptococcus, not group A   04/03/2017 No growth  Final   02/20/2017 No Beta Streptococcus isolated  Final   02/29/2016   Final    50,000 to 100,000 colonies/mL mixed urogenital raquel Susceptibility testing not   routinely done       Urine Studies    Recent Labs   Lab Test 02/18/23  1207 02/01/23  0917 01/14/23  1410 01/09/23  1757 10/31/22  1527   LEUKEST Small* Negative Trace* Negative Negative   WBCU 1 2 1 0 1

## 2023-02-18 NOTE — ED TRIAGE NOTES
Patient arrives to the ER with complaints of being diagnosed with CDIFF 6 weeks ago and states it is not getting any better and she was told then that she was borderline diabetic ketoacidosis. She admits that her last BS at home around midnight read high.     Triage Assessment     Row Name 02/18/23 0549       Triage Assessment (Adult)    Airway WDL WDL       Respiratory WDL    Respiratory WDL WDL       Skin Circulation/Temperature WDL    Skin Circulation/Temperature WDL WDL       Cardiac WDL    Cardiac WDL WDL       Peripheral/Neurovascular WDL    Peripheral Neurovascular WDL WDL       Cognitive/Neuro/Behavioral WDL    Cognitive/Neuro/Behavioral WDL WDL

## 2023-02-19 LAB — GLUCOSE BLDC GLUCOMTR-MCNC: 319 MG/DL (ref 70–99)

## 2023-02-19 NOTE — DISCHARGE SUMMARY
[facility]  Hospitalist Discharge Summary      Date of Admission:  2/18/2023  Date of Discharge:  2/18/2023  7:32 PM  Discharging Provider: Pascual Marmolejo MD  Discharge Service: Hospitalist Service, GOLD TEAM 12    Discharge Diagnoses   [place] discharge   DKA   H/o clostridium difficile colitis     Follow-ups Needed After Discharge   {Additional follow-up instructions/to-do's for PCP    :diarrhea needing testing? Blood sugars     Unresulted Labs Ordered in the Past 30 Days of this Admission     Date and Time Order Name Status Description    2/18/2023 10:12 AM Blood Culture Hand, Right Preliminary     2/18/2023 10:12 AM Blood Culture Hand, Left Preliminary     2/18/2023 10:06 AM Glutamic acid decarboxylase antibody In process     2/18/2023 10:02 AM Drug Screen 9, Ser/Rubi w/ Rflx to Conf In process       These results will be followed up by primary care     Discharge Disposition   Discharged to home  Left against medical advise   Condition at discharge: Guarded      Hospital Course    Myriam Wylie is a 22 year old female admitted on 2/18/2023. She presents with high blood sugars, anion gap, positive ketones and metabolic acidosis most concerning for diabetic ketoacidosis.     Hospital course   Pt presented on 2/18 with labs indicative of dka and some concerns of diarrhea, She was placed on dka protocol and endocrine services were consulted, on closure of anion gap patient was switched to sub q insulin with plans to further workup her diarrhea as she recently was treated for clostridium difficile infection. Later in the evening shift, pt decided to leave against medical advise as she was feeling better, Per communication between Dr warren and the patient she felt as if her hospital stay was done after her dka was resolved. She did not want to wait or her diarrhea workup and mentioned she could get care as an outpatient.     Last Home diabetic regiment   -Lantus 15 units at  noon    -Aspart 1 unit for 7 gram carbs in am   -Aspart 1 unit for 10 grams carbs in afternoon and supper time        Consultations This Hospital Stay   PHARMACY IP CONSULT  ENDOCRINE DIABETES ADULT IP CONSULT  MEDICATION HISTORY IP PHARMACY CONSULT  MEDICATION HISTORY IP PHARMACY CONSULT  INFECTIOUS DISEASE GENERAL ADULT IP CONSULT  INFECTIOUS DISEASE GENERAL ADULT IP CONSULT    Code Status   Prior    Time Spent on this Encounter   IPascual MD, personally saw the patient today and spent greater than 30 minutes discharging this patient.       Pascual Marmolejo MD  [facility] EMERGENCY DEPARTMENT  [address] [address]  Phone: [phone]  ______________________________________________________________________    Physical Exam   Vital Signs: Temp: 98.1  F (36.7  C) Temp src: Oral BP: (!) 142/97 Pulse: 106   Resp: 14 SpO2: 100 % O2 Device: None (Room air)    Weight: 110 lbs 0 oz  Constitutional: awake, alert, cooperative, no apparent distress, and appears stated age  Eyes: Lids and lashes normal, pupils equal, round and reactive to light, extra ocular muscles intact, sclera clear, conjunctiva normal  ENT: Normocephalic, without obvious abnormality, atraumatic, sinuses nontender on palpation, external ears without lesions, oral pharynx with moist mucous membranes, tonsils without erythema or exudates, gums normal and good dentition.  Hematologic / Lymphatic: no cervical lymphadenopathy  Respiratory: No increased work of breathing, good air exchange, clear to auscultation bilaterally, no crackles or wheezing  Cardiovascular: Normal apical impulse, regular rate and rhythm, normal S1 and S2, no S3 or S4, and no murmur noted  GI: No scars, normal bowel sounds, soft, non-distended, non-tender, no masses palpated, no hepatosplenomegally  Genitounirinary:   Skin: no bruising or bleeding  Musculoskeletal: There is no redness, warmth, or swelling of the joints.  Full range of motion noted.  Motor  strength is 5 out of 5 all extremities bilaterally.  Tone is normal.  Neurologic: Awake, alert, oriented to name, place and time.  Cranial nerves II-XII are grossly intact.  Motor is 5 out of 5 bilaterally.  Cerebellar finger to nose, heel to shin intact.  Sensory is intact.  Babinski down going, Romberg negative, and gait is normal.  Neuropsychiatric: General: normal, calm and normal eye contact       Primary Care Physician   Physician No Ref-Primary    Discharge Orders   No discharge procedures on file.    Significant Results and Procedures   Most Recent 3 CBC's:Recent Labs   Lab Test 02/18/23  0616 02/01/23  0910 01/15/23  0428   WBC 9.2 3.6* 7.1   HGB 12.9 11.8 10.4*   MCV 85 87 85    256 241     Most Recent 3 BMP's:Recent Labs   Lab Test 02/18/23  1759 02/18/23  1614 02/18/23  1458 02/18/23  1357 02/18/23  1320 02/18/23  1003 02/18/23  0616   *  --   --   --  135*  --  134*   POTASSIUM 3.9  --   --   --  4.3  --  4.7   CHLORIDE 103  --   --   --  105  --  100   CO2 18*  --   --   --  16*  --  15*   BUN 9.6  --   --   --  11.7  --  10.2   CR 0.66  --   --   --  0.69  --  0.74   ANIONGAP 13  --   --   --  14  --  19*   LILIANA 8.8  --   --   --  9.3  --  9.7   * 122* 105*   < > 150*   < > 315*    < > = values in this interval not displayed.     Most Recent 2 LFT's:Recent Labs   Lab Test 02/18/23  0616 02/01/23  0910   AST 16 18   ALT 10 16   ALKPHOS 190* 155*   BILITOTAL 0.3 0.3   ,   Results for orders placed or performed during the hospital encounter of 02/18/23   CT Abdomen Pelvis w Contrast    Narrative    CT abdomen pelvis with contrast    INDICATION: Assess for C. difficile colitis    COMPARISON: 1/14/2023    Contrast: 68 mL intravenous [id number]    FINDINGS: The included lower chest shows no dominant pulmonary nodule,  pleural or pericardial effusion or consolidation.  Within the abdomen and pelvis, the liver, gallbladder, spleen comment  bilateral adrenal glands, bilateral kidneys and  pancreas appear  unremarkable. There is free fluid within the pelvis. Uterine  endometrium appears somewhat thickened. Left ovarian follicles are  prominent. Urinary bladder incompletely distended. No enlarged  inguinal common iliac chain or other deep pelvic lymph nodes. Some  bowel wall edema and thickening of the small bowel in the pelvis.  There is indistinct distal colonic wall thickening suggestive of  inflammatory and/or infectious colitis. All densities appear  associated with loops of bowel.  Bone detail shows good mineralization.      Impression    IMPRESSION: Prominent small bowel enteritis with perhaps mild distal  colonic inflammatory/infectious colitis. Pelvic free fluid. Thickened  endometrium.    SHARRI GONZALEZ MD         SYSTEM ID:  [id number]       Discharge Medications   Discharge Medication List as of 2/18/2023  7:32 PM      CONTINUE these medications which have NOT CHANGED    Details   Ashwagandha 500 MG CAPS Take 500 mg by mouth daily, Historical      Fenugreek 500 MG CAPS Take 500 mg by mouth daily, Historical      Glucagon 0.5 MG/0.1ML SOSY Inject 1 ampule Subcutaneous daily as needed, Disp-1 mL, R-0, E-Prescribe      insulin aspart (NOVOLOG PEN) 100 UNIT/ML pen Inject 1-5 Units Subcutaneous 4 times daily (with meals and nightly), Disp-15 mL, R-1, E-Prescribe      insulin glargine (BASAGLAR KWIKPEN) 100 UNIT/ML pen Inject 14 Units Subcutaneous daily At noon, Disp-15 mL, R-0, E-PrescribeIf Basaglar is not covered by insurance, may substitute Lantus at same dose and frequency.        Debo 500 MG CAPS Take 500 mg by mouth daily, Historical      ondansetron (ZOFRAN ODT) 4 MG ODT tab Take 4 mg by mouth every 6 hours as needed, Historical      Prenatal Vit-Fe Fumarate-FA (PRENATAL MULTIVITAMIN W/IRON) 27-0.8 MG tablet Take 1 tablet by mouth daily, Disp-90 tablet, R-3, E-Prescribe      Probiotic Product (ACIDOPHILUS) CHEW Take 1 tablet by mouth daily, Historical      acetone, Urine, test STRP 1  strip by In Vitro route as neededDisp-50 each, R-1Local Print      blood glucose monitoring (ACCU-CHEK FASTCLIX) lancets Use to test blood sugar 6 times daily or as directed., Disp-2 Box, R-6, E-Prescribe      blood glucose monitoring (ACCU-CHEK HENRI SMARTVIEW) meter device kit Use to test blood sugar 6 times daily or as directed.Disp-2 kit, [id number] kit home and 1 kit school      blood glucose monitoring (ACCU-CHEK SMARTVIEW) test strip Use to test blood sugar 6 times daily or as directed., Disp-200 each, R-6, E-Prescribe      insulin pen needle (BD HENRI U/F) 32G X 4 MM Use 6 pen needles daily or as directed.Disp-200 each, [id number]           Allergies   No Known Allergies

## 2023-02-19 NOTE — PROGRESS NOTES
Called by RN    Insulin drip stopped at 5pm, D5 is still running    Recent labs 530pm AG 13. Bicarb 18    Recommend to check BMP in 4hours to make sure she does not go to DKA. Primary team can folow up with results (order already in place in chart)    If DKA, then restart drip, If not can continue basal bolus regimen

## 2023-02-21 ENCOUNTER — PATIENT OUTREACH (OUTPATIENT)
Dept: CARE COORDINATION | Facility: CLINIC | Age: 23
End: 2023-02-21
Payer: COMMERCIAL

## 2023-02-21 LAB
AMPHETAMINES SERPL QL SCN: NEGATIVE NG/ML
ANNOTATION COMMENT IMP: NORMAL
BARBITURATES SERPL QL SCN: NEGATIVE NG/ML
BENZODIAZ SERPL QL SCN: NEGATIVE NG/ML
BUPRENORPHINE SERPL-MCNC: NEGATIVE NG/ML
CANNABINOIDS SERPL QL SCN: POSITIVE NG/ML
COCAINE SERPL QL SCN: NEGATIVE NG/ML
GAD65 AB SER IA-ACNC: <5 IU/ML
METHADONE SERPL QL SCN: NEGATIVE NG/ML
METHAMPHET SERPL QL: NEGATIVE NG/ML
OPIATES SERPL QL SCN: NEGATIVE NG/ML
OXYCODONE SERPL QL: NEGATIVE NG/ML
PCP SERPL QL SCN: NEGATIVE NG/ML

## 2023-02-21 NOTE — PROGRESS NOTES
"[facility] Care Resource Center Contact  [facility]/Voicemail     Clinical Data: Transitional Care Management Outreach     Outreach attempted x 2.     First outreach attempt, 2/20: Missing or Invalid Number - Patient's mobile phone did not ring. Patient's mobile phone is invalid.    Second outreach attempt, 2/21: No Answer/Busy - Patient's phone did not ring, but went directly to an automated voice message system, \"we're sorry your call cannot be completed at this time please hang up and try again later.\"    W was unable to leave any messages on patient's voicemail, providing [facility]'s 24/7 scheduling and nurse triage phone number [phone] ([phone]) for questions/concerns and/or to schedule an appt with an [facility] provider, if they do not have a PCP.      Plan:  [facility] will do no further outreaches at this time.     JR Dubois  [phone]  [facility]    *Connected Care Resource Team does NOT follow patient ongoing. Referrals are identified based on internal discharge reports and the outreach is to ensure patient has an understanding of their discharge instructions.  "

## 2023-02-22 LAB — CANNABINOIDS SERPL-MCNC: 34 NG/ML

## 2023-02-23 LAB
BACTERIA BLD CULT: NO GROWTH
BACTERIA BLD CULT: NO GROWTH

## 2023-02-25 LAB — HOLD SPECIMEN: NORMAL

## 2023-04-10 ENCOUNTER — APPOINTMENT (OUTPATIENT)
Dept: GENERAL RADIOLOGY | Facility: CLINIC | Age: 23
DRG: 639 | End: 2023-04-10
Attending: EMERGENCY MEDICINE
Payer: COMMERCIAL

## 2023-04-10 ENCOUNTER — HOSPITAL ENCOUNTER (INPATIENT)
Facility: CLINIC | Age: 23
LOS: 1 days | Discharge: LEFT BEFORE DISCHARGE | DRG: 639 | End: 2023-04-10
Attending: EMERGENCY MEDICINE | Admitting: HOSPITALIST
Payer: COMMERCIAL

## 2023-04-10 VITALS
OXYGEN SATURATION: 99 % | DIASTOLIC BLOOD PRESSURE: 84 MMHG | TEMPERATURE: 98.1 F | HEART RATE: 87 BPM | RESPIRATION RATE: 16 BRPM | SYSTOLIC BLOOD PRESSURE: 132 MMHG

## 2023-04-10 DIAGNOSIS — E10.10 DIABETIC KETOACIDOSIS WITHOUT COMA ASSOCIATED WITH TYPE 1 DIABETES MELLITUS (H): ICD-10-CM

## 2023-04-10 LAB
ALBUMIN SERPL BCG-MCNC: 3.9 G/DL (ref 3.5–5.2)
ALBUMIN UR-MCNC: NEGATIVE MG/DL
ALP SERPL-CCNC: 167 U/L (ref 35–104)
ALT SERPL W P-5'-P-CCNC: 19 U/L (ref 10–35)
ANION GAP SERPL CALCULATED.3IONS-SCNC: 10 MMOL/L (ref 7–15)
ANION GAP SERPL CALCULATED.3IONS-SCNC: 10 MMOL/L (ref 7–15)
ANION GAP SERPL CALCULATED.3IONS-SCNC: 11 MMOL/L (ref 7–15)
ANION GAP SERPL CALCULATED.3IONS-SCNC: 18 MMOL/L (ref 7–15)
APPEARANCE UR: CLEAR
AST SERPL W P-5'-P-CCNC: 27 U/L (ref 10–35)
B-OH-BUTYR SERPL-SCNC: 0.3 MMOL/L
B-OH-BUTYR SERPL-SCNC: 2.1 MMOL/L
B-OH-BUTYR SERPL-SCNC: <0.2 MMOL/L
BASOPHILS # BLD AUTO: 0 10E3/UL (ref 0–0.2)
BASOPHILS NFR BLD AUTO: 0 %
BILIRUB SERPL-MCNC: 0.3 MG/DL
BILIRUB UR QL STRIP: NEGATIVE
BUN SERPL-MCNC: 7.3 MG/DL (ref 6–20)
BUN SERPL-MCNC: 7.4 MG/DL (ref 6–20)
BUN SERPL-MCNC: 7.9 MG/DL (ref 6–20)
BUN SERPL-MCNC: 9.8 MG/DL (ref 6–20)
CALCIUM SERPL-MCNC: 8.1 MG/DL (ref 8.6–10)
CALCIUM SERPL-MCNC: 8.3 MG/DL (ref 8.6–10)
CALCIUM SERPL-MCNC: 8.6 MG/DL (ref 8.6–10)
CALCIUM SERPL-MCNC: 9 MG/DL (ref 8.6–10)
CHLORIDE SERPL-SCNC: 101 MMOL/L (ref 98–107)
CHLORIDE SERPL-SCNC: 102 MMOL/L (ref 98–107)
CHLORIDE SERPL-SCNC: 104 MMOL/L (ref 98–107)
CHLORIDE SERPL-SCNC: 98 MMOL/L (ref 98–107)
COLOR UR AUTO: ABNORMAL
CREAT SERPL-MCNC: 0.6 MG/DL (ref 0.51–0.95)
CREAT SERPL-MCNC: 0.62 MG/DL (ref 0.51–0.95)
CREAT SERPL-MCNC: 0.64 MG/DL (ref 0.51–0.95)
CREAT SERPL-MCNC: 0.67 MG/DL (ref 0.51–0.95)
CRP SERPL-MCNC: 6.61 MG/L
DEPRECATED HCO3 PLAS-SCNC: 21 MMOL/L (ref 22–29)
DEPRECATED HCO3 PLAS-SCNC: 23 MMOL/L (ref 22–29)
DEPRECATED HCO3 PLAS-SCNC: 23 MMOL/L (ref 22–29)
DEPRECATED HCO3 PLAS-SCNC: 24 MMOL/L (ref 22–29)
EOSINOPHIL # BLD AUTO: 0.1 10E3/UL (ref 0–0.7)
EOSINOPHIL NFR BLD AUTO: 1 %
ERYTHROCYTE [DISTWIDTH] IN BLOOD BY AUTOMATED COUNT: 15 % (ref 10–15)
GFR SERPL CREATININE-BSD FRML MDRD: >90 ML/MIN/1.73M2
GLUCOSE BLDC GLUCOMTR-MCNC: 107 MG/DL (ref 70–99)
GLUCOSE BLDC GLUCOMTR-MCNC: 120 MG/DL (ref 70–99)
GLUCOSE BLDC GLUCOMTR-MCNC: 167 MG/DL (ref 70–99)
GLUCOSE BLDC GLUCOMTR-MCNC: 174 MG/DL (ref 70–99)
GLUCOSE BLDC GLUCOMTR-MCNC: 199 MG/DL (ref 70–99)
GLUCOSE BLDC GLUCOMTR-MCNC: 205 MG/DL (ref 70–99)
GLUCOSE BLDC GLUCOMTR-MCNC: 424 MG/DL (ref 70–99)
GLUCOSE BLDC GLUCOMTR-MCNC: 88 MG/DL (ref 70–99)
GLUCOSE SERPL-MCNC: 120 MG/DL (ref 70–99)
GLUCOSE SERPL-MCNC: 148 MG/DL (ref 70–99)
GLUCOSE SERPL-MCNC: 234 MG/DL (ref 70–99)
GLUCOSE SERPL-MCNC: 350 MG/DL (ref 70–99)
GLUCOSE UR STRIP-MCNC: >=1000 MG/DL
HBA1C MFR BLD: 11.2 %
HCG SERPL QL: NEGATIVE
HCT VFR BLD AUTO: 38.2 % (ref 35–47)
HGB BLD-MCNC: 11.9 G/DL (ref 11.7–15.7)
HGB UR QL STRIP: NEGATIVE
HOLD SPECIMEN: NORMAL
IMM GRANULOCYTES # BLD: 0 10E3/UL
IMM GRANULOCYTES NFR BLD: 0 %
KETONES UR STRIP-MCNC: 60 MG/DL
LEUKOCYTE ESTERASE UR QL STRIP: NEGATIVE
LYMPHOCYTES # BLD AUTO: 2 10E3/UL (ref 0.8–5.3)
LYMPHOCYTES NFR BLD AUTO: 22 %
MAGNESIUM SERPL-MCNC: 1.5 MG/DL (ref 1.7–2.3)
MCH RBC QN AUTO: 25.8 PG (ref 26.5–33)
MCHC RBC AUTO-ENTMCNC: 31.2 G/DL (ref 31.5–36.5)
MCV RBC AUTO: 83 FL (ref 78–100)
MONOCYTES # BLD AUTO: 0.3 10E3/UL (ref 0–1.3)
MONOCYTES NFR BLD AUTO: 3 %
NEUTROPHILS # BLD AUTO: 6.8 10E3/UL (ref 1.6–8.3)
NEUTROPHILS NFR BLD AUTO: 74 %
NITRATE UR QL: NEGATIVE
NRBC # BLD AUTO: 0 10E3/UL
NRBC BLD AUTO-RTO: 0 /100
PH UR STRIP: 6 [PH] (ref 5–7)
PHOSPHATE SERPL-MCNC: 3.3 MG/DL (ref 2.5–4.5)
PHOSPHATE SERPL-MCNC: 3.7 MG/DL (ref 2.5–4.5)
PHOSPHATE SERPL-MCNC: 4.4 MG/DL (ref 2.5–4.5)
PLATELET # BLD AUTO: 270 10E3/UL (ref 150–450)
POTASSIUM SERPL-SCNC: 3.7 MMOL/L (ref 3.4–5.3)
POTASSIUM SERPL-SCNC: 4 MMOL/L (ref 3.4–5.3)
POTASSIUM SERPL-SCNC: 4 MMOL/L (ref 3.4–5.3)
POTASSIUM SERPL-SCNC: 4.4 MMOL/L (ref 3.4–5.3)
PROT SERPL-MCNC: 7.2 G/DL (ref 6.4–8.3)
RBC # BLD AUTO: 4.62 10E6/UL (ref 3.8–5.2)
RBC URINE: 1 /HPF
SARS-COV-2 RNA RESP QL NAA+PROBE: NEGATIVE
SODIUM SERPL-SCNC: 135 MMOL/L (ref 136–145)
SODIUM SERPL-SCNC: 136 MMOL/L (ref 136–145)
SODIUM SERPL-SCNC: 137 MMOL/L (ref 136–145)
SODIUM SERPL-SCNC: 137 MMOL/L (ref 136–145)
SP GR UR STRIP: 1.03 (ref 1–1.03)
SQUAMOUS EPITHELIAL: 9 /HPF
UROBILINOGEN UR STRIP-MCNC: NORMAL MG/DL
WBC # BLD AUTO: 9.3 10E3/UL (ref 4–11)
WBC URINE: 2 /HPF

## 2023-04-10 PROCEDURE — 82310 ASSAY OF CALCIUM: CPT | Performed by: HOSPITALIST

## 2023-04-10 PROCEDURE — 83735 ASSAY OF MAGNESIUM: CPT | Performed by: EMERGENCY MEDICINE

## 2023-04-10 PROCEDURE — 250N000012 HC RX MED GY IP 250 OP 636 PS 637: Performed by: HOSPITALIST

## 2023-04-10 PROCEDURE — 36415 COLL VENOUS BLD VENIPUNCTURE: CPT | Performed by: HOSPITALIST

## 2023-04-10 PROCEDURE — 96374 THER/PROPH/DIAG INJ IV PUSH: CPT

## 2023-04-10 PROCEDURE — 82010 KETONE BODYS QUAN: CPT | Performed by: HOSPITALIST

## 2023-04-10 PROCEDURE — 80053 COMPREHEN METABOLIC PANEL: CPT | Performed by: EMERGENCY MEDICINE

## 2023-04-10 PROCEDURE — 81001 URINALYSIS AUTO W/SCOPE: CPT | Performed by: EMERGENCY MEDICINE

## 2023-04-10 PROCEDURE — 84100 ASSAY OF PHOSPHORUS: CPT | Performed by: EMERGENCY MEDICINE

## 2023-04-10 PROCEDURE — U0005 INFEC AGEN DETEC AMPLI PROBE: HCPCS | Performed by: EMERGENCY MEDICINE

## 2023-04-10 PROCEDURE — 84100 ASSAY OF PHOSPHORUS: CPT | Performed by: HOSPITALIST

## 2023-04-10 PROCEDURE — 85025 COMPLETE CBC W/AUTO DIFF WBC: CPT | Performed by: EMERGENCY MEDICINE

## 2023-04-10 PROCEDURE — 250N000011 HC RX IP 250 OP 636: Performed by: EMERGENCY MEDICINE

## 2023-04-10 PROCEDURE — 99291 CRITICAL CARE FIRST HOUR: CPT

## 2023-04-10 PROCEDURE — 86140 C-REACTIVE PROTEIN: CPT | Performed by: HOSPITALIST

## 2023-04-10 PROCEDURE — 120N000001 HC R&B MED SURG/OB

## 2023-04-10 PROCEDURE — 258N000003 HC RX IP 258 OP 636: Performed by: EMERGENCY MEDICINE

## 2023-04-10 PROCEDURE — 83036 HEMOGLOBIN GLYCOSYLATED A1C: CPT | Performed by: EMERGENCY MEDICINE

## 2023-04-10 PROCEDURE — 250N000012 HC RX MED GY IP 250 OP 636 PS 637: Performed by: EMERGENCY MEDICINE

## 2023-04-10 PROCEDURE — 82962 GLUCOSE BLOOD TEST: CPT

## 2023-04-10 PROCEDURE — 82010 KETONE BODYS QUAN: CPT | Performed by: EMERGENCY MEDICINE

## 2023-04-10 PROCEDURE — 96361 HYDRATE IV INFUSION ADD-ON: CPT

## 2023-04-10 PROCEDURE — 71046 X-RAY EXAM CHEST 2 VIEWS: CPT

## 2023-04-10 PROCEDURE — 99223 1ST HOSP IP/OBS HIGH 75: CPT | Mod: AI | Performed by: HOSPITALIST

## 2023-04-10 PROCEDURE — 84703 CHORIONIC GONADOTROPIN ASSAY: CPT | Performed by: EMERGENCY MEDICINE

## 2023-04-10 PROCEDURE — C9803 HOPD COVID-19 SPEC COLLECT: HCPCS

## 2023-04-10 RX ORDER — ONDANSETRON 2 MG/ML
4 INJECTION INTRAMUSCULAR; INTRAVENOUS EVERY 6 HOURS PRN
Status: DISCONTINUED | OUTPATIENT
Start: 2023-04-10 | End: 2023-04-10 | Stop reason: HOSPADM

## 2023-04-10 RX ORDER — ACETAMINOPHEN 325 MG/1
325-650 TABLET ORAL EVERY 6 HOURS PRN
COMMUNITY

## 2023-04-10 RX ORDER — DEXTROSE MONOHYDRATE 25 G/50ML
25-50 INJECTION, SOLUTION INTRAVENOUS
Status: DISCONTINUED | OUTPATIENT
Start: 2023-04-10 | End: 2023-04-10 | Stop reason: HOSPADM

## 2023-04-10 RX ORDER — ONDANSETRON 2 MG/ML
4 INJECTION INTRAMUSCULAR; INTRAVENOUS ONCE
Status: COMPLETED | OUTPATIENT
Start: 2023-04-10 | End: 2023-04-10

## 2023-04-10 RX ORDER — IBUPROFEN 200 MG
200 TABLET ORAL EVERY 4 HOURS PRN
COMMUNITY

## 2023-04-10 RX ORDER — NICOTINE POLACRILEX 4 MG
15-30 LOZENGE BUCCAL
Status: DISCONTINUED | OUTPATIENT
Start: 2023-04-10 | End: 2023-04-10 | Stop reason: HOSPADM

## 2023-04-10 RX ORDER — ACETAMINOPHEN 325 MG/1
975 TABLET ORAL EVERY 8 HOURS
Status: DISCONTINUED | OUTPATIENT
Start: 2023-04-10 | End: 2023-04-10 | Stop reason: HOSPADM

## 2023-04-10 RX ORDER — LIDOCAINE 40 MG/G
CREAM TOPICAL
Status: DISCONTINUED | OUTPATIENT
Start: 2023-04-10 | End: 2023-04-10 | Stop reason: HOSPADM

## 2023-04-10 RX ORDER — POTASSIUM CHLORIDE 7.45 MG/ML
10 INJECTION INTRAVENOUS ONCE
Status: COMPLETED | OUTPATIENT
Start: 2023-04-10 | End: 2023-04-10

## 2023-04-10 RX ORDER — ONDANSETRON 2 MG/ML
4 INJECTION INTRAMUSCULAR; INTRAVENOUS ONCE
Status: DISCONTINUED | OUTPATIENT
Start: 2023-04-10 | End: 2023-04-10

## 2023-04-10 RX ORDER — ONDANSETRON 4 MG/1
4 TABLET, ORALLY DISINTEGRATING ORAL EVERY 6 HOURS PRN
Status: DISCONTINUED | OUTPATIENT
Start: 2023-04-10 | End: 2023-04-10 | Stop reason: HOSPADM

## 2023-04-10 RX ORDER — MAGNESIUM SULFATE HEPTAHYDRATE 40 MG/ML
2 INJECTION, SOLUTION INTRAVENOUS ONCE
Status: COMPLETED | OUTPATIENT
Start: 2023-04-10 | End: 2023-04-10

## 2023-04-10 RX ADMIN — SODIUM CHLORIDE 0.2 UNITS/HR: 9 INJECTION, SOLUTION INTRAVENOUS at 12:33

## 2023-04-10 RX ADMIN — ONDANSETRON 4 MG: 2 INJECTION INTRAMUSCULAR; INTRAVENOUS at 10:59

## 2023-04-10 RX ADMIN — INSULIN GLARGINE 16 UNITS: 100 INJECTION, SOLUTION SUBCUTANEOUS at 16:53

## 2023-04-10 RX ADMIN — SODIUM CHLORIDE 1000 ML: 9 INJECTION, SOLUTION INTRAVENOUS at 11:03

## 2023-04-10 RX ADMIN — MAGNESIUM SULFATE HEPTAHYDRATE 2 G: 40 INJECTION, SOLUTION INTRAVENOUS at 13:37

## 2023-04-10 RX ADMIN — POTASSIUM CHLORIDE 10 MEQ: 7.46 INJECTION, SOLUTION INTRAVENOUS at 12:32

## 2023-04-10 RX ADMIN — DEXTROSE AND SODIUM CHLORIDE 1000 ML: 5; 450 INJECTION, SOLUTION INTRAVENOUS at 12:31

## 2023-04-10 ASSESSMENT — ACTIVITIES OF DAILY LIVING (ADL)
ADLS_ACUITY_SCORE: 35

## 2023-04-10 NOTE — ED PROVIDER NOTES
"  History     Chief Complaint:  Hyperglycemia       HPI   Myriam Wylie is a 22 year old female with a history of type 1 diabetes and DKA, C. difficile, who presents with concerns for DKA.  She states she \"was a mild DKA 3 to 4 days ago\" but that she could \"get through it.\"  Unfortunately she has had vomiting and diarrhea for the past 3 days with associated lower abdominal pain.  She states this is the same abdominal pain she gets every time that she is in DKA.  She denies urinary symptoms except for frequent urination.  There is no cough or chest pain but she has had \"allergies and a runny nose.\"  Otherwise she has no complaints.    Independent Historian:   None - Patient Only    Review of External Notes:  Multiple ER visits: [facility] 2/18,  [facility] [place]16, [facility] 11/27    ROS:  Review of Systems    Allergies:  No Known Allergies     Medications:    acetone, Urine, test STRP  Ashwagandha 500 MG CAPS  blood glucose monitoring (ACCU-CHEK FASTCLIX) lancets  blood glucose monitoring (ACCU-CHEK HENRI SMARTVIEW) meter device kit  blood glucose monitoring (ACCU-CHEK SMARTVIEW) test strip  Fenugreek 500 MG CAPS  Glucagon 0.5 MG/0.1ML SOSY  insulin aspart (NOVOLOG PEN) 100 UNIT/ML pen  insulin glargine (BASAGLAR KWIKPEN) 100 UNIT/ML pen  insulin pen needle (BD HENRI U/F) 32G X 4 MM  Debo 500 MG CAPS  ondansetron (ZOFRAN ODT) 4 MG ODT tab  Prenatal Vit-Fe Fumarate-FA (PRENATAL MULTIVITAMIN W/IRON) 27-0.8 MG tablet  Probiotic Product (ACIDOPHILUS) CHEW        Past Medical History:    Past Medical History:   Diagnosis Date     C. difficile colitis      Diabetes type 1, uncontrolled      DKA (diabetic ketoacidosis) (H)      PID (acute pelvic inflammatory disease)        Past Surgical History:    Past Surgical History:   Procedure Laterality Date     COLONOSCOPY N/A 9/18/2019    Procedure: COLONOSCOPY;  Surgeon: Jeremi Banks MD;  Location:  PEDS SEDATION      ESOPHAGOSCOPY, GASTROSCOPY, DUODENOSCOPY (EGD), COMBINED N/A 9/18/2019    " Procedure: Upper endoscopy and colonoscopy with biopsy;  Surgeon: Jeremi Banks MD;  Location:  PEDS SEDATION         Family History:    family history is not on file.    Social History:   reports that she has never smoked. She has never used smokeless tobacco. She reports current alcohol use. She reports current drug use. Drug: Marijuana.  PCP: No Ref-Primary, Physician     Physical Exam     Patient Vitals for the past 24 hrs:   BP Temp Temp src Pulse Resp SpO2   04/10/23 1018 (!) 142/72 98.1  F (36.7  C) Oral 113 16 100 %        Physical Exam  General/Appearance: appears stated age, well-groomed, appears to not feel well  Eyes: EOMI, no scleral injection, no icterus  ENT: MMM, rhinorhea  Neck: supple, nl ROM, no stiffness  Cardiovascular: tachy but regular, nl S1S2, no m/r/g, 2+ pulses in all 4 extremities, cap refill <2sec  Respiratory: CTAB, good air movement throughout, no wheezes/rhonchi/rales, no increased WOB, no retractions  Back: no lesions  GI: abd soft, non-distended, diffuse discomfort to palpation,  no HSM, no rebound, no guarding, nl BS  MSK: CRUZ, good tone, no bony abnormality  Skin: warm and well-perfused, no rash, no edema, no ecchymosis, nl turgor  Neuro: GCS 15, alert and oriented, no gross focal neuro deficits  Psych: interacts appropriately  Heme: no petechia, no purpura, no active bleeding        Emergency Department Course   [unfilled]    Imaging:  XR Chest 2 Views   Final Result   IMPRESSION: No acute cardiopulmonary process.      MARINA RUTLEDGE MD            SYSTEM ID:  [id number]         Report per radiology    Laboratory:  Labs Ordered and Resulted from Time of ED Arrival to Time of ED Departure   COMPREHENSIVE METABOLIC PANEL - Abnormal       Result Value    Sodium 137      Potassium 4.4      Chloride 98      Carbon Dioxide (CO2) 21 (*)     Anion Gap 18 (*)     Urea Nitrogen 9.8      Creatinine 0.67      Calcium 9.0      Glucose 350 (*)     Alkaline Phosphatase 167 (*)     AST 27      ALT 19       Protein Total 7.2      Albumin 3.9      Bilirubin Total 0.3      GFR Estimate >90     KETONE BETA-HYDROXYBUTYRATE QUANTITATIVE, RAPID - Abnormal    Ketone (Beta-Hydroxybutyrate) Quantitative 2.10 (*)    GLUCOSE BY METER - Abnormal    GLUCOSE BY METER POCT 424 (*)    CBC WITH PLATELETS AND DIFFERENTIAL - Abnormal    WBC Count 9.3      RBC Count 4.62      Hemoglobin 11.9      Hematocrit 38.2      MCV 83      MCH 25.8 (*)     MCHC 31.2 (*)     RDW 15.0      Platelet Count 270      % Neutrophils 74      % Lymphocytes 22      % Monocytes 3      % Eosinophils 1      % Basophils 0      % Immature Granulocytes 0      NRBCs per 100 WBC 0      Absolute Neutrophils 6.8      Absolute Lymphocytes 2.0      Absolute Monocytes 0.3      Absolute Eosinophils 0.1      Absolute Basophils 0.0      Absolute Immature Granulocytes 0.0      Absolute NRBCs 0.0     ROUTINE UA WITH MICROSCOPIC REFLEX TO CULTURE - Abnormal    Color Urine Straw      Appearance Urine Clear      Glucose Urine >=1000 (*)     Bilirubin Urine Negative      Ketones Urine 60 (*)     Specific Gravity Urine 1.031      Blood Urine Negative      pH Urine 6.0      Protein Albumin Urine Negative      Urobilinogen Urine Normal      Nitrite Urine Negative      Leukocyte Esterase Urine Negative      RBC Urine 1      WBC Urine 2      Squamous Epithelials Urine 9 (*)    HCG QUALITATIVE PREGNANCY - Normal    hCG Serum Qualitative Negative     GLUCOSE MONITOR NURSING POCT   HEMOGLOBIN A1C   PHOSPHORUS   MAGNESIUM   COVID-19 VIRUS (CORONAVIRUS) BY PCR        Emergency Department Course & Assessments:    Interventions:  Medications   0.9% sodium chloride BOLUS (1,000 mLs Intravenous $New Bag 4/10/23 1103)   dextrose 5% and 0.45% NaCl infusion (has no administration in time range)   dextrose 50 % injection 25-50 mL (has no administration in time range)   potassium chloride 10 mEq in 100 mL sterile water infusion (has no administration in time range)   insulin 1 unit/1 mL in NS  (NovoLIN, HumuLIN Regular) drip -ED DKA algorithm (has no administration in time range)   ondansetron (ZOFRAN) injection 4 mg (4 mg Intravenous $Given 4/10/23 1059)        Assessments:  1200 Updated pt    Independent Interpretation (X-rays, CTs, rhythm strip):  None    Consultations/Discussion of Management or Tests:  1220 Dr Haywood will admit        Social Determinants of Health affecting care:   None    Disposition:  The patient was admitted to the hospital under the care of Dr. Haywood.     Impression & Plan      Medical Decision Making:  This patient is a pleasant 22-year-old female with type 1 diabetes who presents today with concerns for DKA.  She has had 3 days of vomiting and diarrhea with lower abdominal discomfort, all of which are familiar with previous DKA episodes.  Here there is no sign of infection but she does have mild acidosis with a increased gap and ketones, all consistent with DKA.  She is getting some replacement potassium but then will be started on an insulin drip.  She will be admitted to the hospitalist service for further work-up and management.    Critical Care time:  was 30 minutes for this patient excluding procedures.    Diagnosis:    ICD-10-CM    1. Diabetic ketoacidosis without coma associated with type 1 diabetes mellitus (H)  E10.10            Discharge Medications:  New Prescriptions    No medications on file         Salome Mccloud MD  04/10/23 1474

## 2023-04-10 NOTE — H&P
[facility]    History and Physical - Hospitalist Service       Date of Admission:  4/10/2023    Assessment & Plan    Myriam Wylie is a 22 year old female with history of poorly controlled type 1 diabetes and prior episodes of DKA.  She is also been treated for C. difficile colitis 3 times over the past 4 months or so.  She came to the emergency room today complaining of about a week of loose stool and lower abdominal pain.  She was also having high glucose readings and her strips indicated mild ketoacidosis.  She attributes the abdominal pain to the ketoacidosis rather than C. difficile infection.  In the emergency room she had mild ketoacidosis and a mild elevation of her anion gap.  She was hemodynamically stable and nontoxic-appearing.  She was started on IV fluid IV insulin and is being admitted to the hospital.    Diabetic ketoacidosis    Uncontrolled type 1 diabetes   Hemoglobin A1C   Date Value Ref Range Status   04/10/2023 11.2 (H) <5.7 % Final   Her DKA appears to be borderline.  She does have very uncontrolled type 1 diabetes.  She has already been started on IV insulin.    Continue IV insulin, monitor glucometers and adjust per protocol as needed    Monitor BMP and electrolytes every 2 hours along with serum ketones    Hold her prior to admission is on glargine and aspart insulin for now    Continue IV fluid with D5 half-normal saline    Recent treatment for clostridium difficile colitis     Given her ongoing abdominal discomfort and loose stool we will recheck for C. difficile along with a CRP    Hypomagnesemia      Monitor electrolytes and replace as needed per protocol     Diet:  clears   DVT Prophylaxis: Pneumatic Compression Devices  Martines Catheter: Not present  Lines: None     Cardiac Monitoring: ACTIVE order. Indication: Electrolyte Imbalance (24 hours)- Magnesium <1.3 mg/ml; Potassium < =2.8 or > 5.5 mg/ml  Code Status:   FULL    Clinically Significant Risk Factors  Present on Admission            # Hypomagnesemia: Lowest Mg = 1.5 mg/dL in last 2 days, will replace as needed                    Disposition Plan      Expected Discharge Date: 04/12/2023                  Zana Haywood MD  Hospitalist Service  [facility]  Securely message with MyWishBoard (more info)  Text page via ebindle Paging/Directory     ______________________________________________________________________    Chief Complaint   Diabetic ketoacidosis      History is obtained from the patient    History of Present Illness   Myriam Wylie is a 22 year old female who has a history of poorly controlled type 1 diabetes.  She uses Lantus once a day at noon.  She uses aspart insulin by carbohydrate ratio and correction scale.  She says that she frequently has elevated readings at home but for the past week or so its been in the 300s.  She has developed some lower abdominal pain and vomiting which she says is very typical of her past episodes of ketoacidosis.  She says that she waited till today to come to the hospital because she always gets admitted and really did not want to be admitted to the hospital.  She says that since this past November she has been treated 3 times for C. difficile colitis.  She had her first bout over 5 years ago but then developed C. difficile infection again.  She completed her third course of oral vancomycin about 2 weeks ago.  She says she still having loose stool but only around twice a day.  She developed the abdominal pain over the past few days.  But she is not sure that it is from C. difficile.  She has not had fever or chills or sore throat.  She has recently had nasal congestion with this as resolved.  In the emergency room her blood pressure was 142/72 temperature 98.1  F pulse 113 respiratory rate 16 ox saturation 100%.  On exam she was not in any distress she was nontoxic-appearing her heart was tachycardic her lungs were clear her abdomen was  described as having diffuse discomfort to palpation but no rebound or guarding.  Serum sodium 137 potassium 4.4 CO2 21 anion gap 18 creatinine 0.67 LFTs normal ketones 2.10 glucometer 424 white count 9.3 hemoglobin 12 g.  UA showed 1 red cell 1 white cell no nitrite no leukocyte esterase 60 ketones greater than thousand glucose.  Serum hCG negative.  The patient was given IV fluid and started on IV insulin.      Past Medical History    Past Medical History:   Diagnosis Date     C. difficile colitis      Diabetes type 1, uncontrolled      DKA (diabetic ketoacidosis) (H)      PID (acute pelvic inflammatory disease)        Past Surgical History   Past Surgical History:   Procedure Laterality Date     COLONOSCOPY N/A 2019    Procedure: COLONOSCOPY;  Surgeon: Jermei Banks MD;  Location: [facility]      ESOPHAGOSCOPY, GASTROSCOPY, DUODENOSCOPY (EGD), COMBINED N/A 2019    Procedure: Upper endoscopy and colonoscopy with biopsy;  Surgeon: Jeremi Banks MD;  Location: [facility]        Prior to Admission Medications   Prior to Admission Medications   Prescriptions Last Dose Informant Patient Reported? Taking?   Ashwagandha 500 MG CAPS   Yes No   Sig: Take 500 mg by mouth daily   Fenugreek 500 MG CAPS   Yes No   Sig: Take 500 mg by mouth daily   Glucagon 0.5 MG/0.1ML SOSY  Self No No   Sig: Inject 1 ampule Subcutaneous daily as needed   Debo 500 MG CAPS   Yes No   Sig: Take 500 mg by mouth daily   Prenatal Vit-Fe Fumarate-FA (PRENATAL MULTIVITAMIN W/IRON) 27-0.8 MG tablet  Self No No   Sig: Take 1 tablet by mouth daily   Probiotic Product (ACIDOPHILUS) CHEW   Yes No   Sig: Take 1 tablet by mouth daily   acetone, Urine, test STRP  Self No No   Si strip by In Vitro route as needed   blood glucose monitoring (ACCU-CHEK FASTCLIX) lancets  Self No No   Sig: Use to test blood sugar 6 times daily or as directed.   blood glucose monitoring (ACCU-CHEK HENRI SMARTVIEW) meter device kit  Self No No   Sig: Use to  test blood sugar 6 times daily or as directed.   blood glucose monitoring (ACCU-CHEK SMARTVIEW) test strip  Self No No   Sig: Use to test blood sugar 6 times daily or as directed.   insulin aspart (NOVOLOG PEN) 100 UNIT/ML pen   No No   Sig: Inject 1-5 Units Subcutaneous 4 times daily (with meals and nightly)   Patient taking differently: Inject 1-10 Units Subcutaneous 4 times daily (with meals and nightly) ISF: 1 unit per 50 > 150, For -199 give 1 unit. For -249 give 2 units. For -299 give 3 units. For -349 give 4 units. For BG = or > 350 give 5 units.   insulin glargine (BASAGLAR KWIKPEN) 100 UNIT/ML pen  Self No No   Sig: Inject 14 Units Subcutaneous daily At noon   Patient taking differently: Inject 15 Units Subcutaneous daily At noon   insulin pen needle (BD HENRI U/F) 32G X 4 MM  Self No No   Sig: Use 6 pen needles daily or as directed.   ondansetron (ZOFRAN ODT) 4 MG ODT tab   Yes No   Sig: Take 4 mg by mouth every 6 hours as needed      Facility-Administered Medications: None        Review of Systems    The 10 point Review of Systems is negative other than noted in the HPI or here.      Physical Exam   Vital Signs: Temp: 98.1  F (36.7  C) Temp src: Oral BP: (!) 142/72 Pulse: 115   Resp: 30 SpO2: 100 %      Weight: 0 lbs 0 oz    Constitutional: awake, alert, cooperative, no apparent distress  Respiratory: No increased work of breathing, good air exchange, clear to auscultation bilaterally, no crackles or wheezing  Cardiovascular: regular rate and rhythm, normal S1 and S2, no S3 or S4, and no murmur noted  GI: normal bowel sounds, soft, non-distended, non-tender, no masses palpated  Skin: no rashes  Neurologic: Awake, and no jaundicelert, oriented to name, place and time.  Cranial nerves II-XII are grossly intact.  Motor is 5 out of 5 bilaterally.  Neuropsychiatric: General: normal, calm and normal eye contact      Medical Decision Making       65 MINUTES SPENT BY ME on the date of  service doing chart review, history, exam, documentation & further activities per the note.  MANAGEMENT DISCUSSED with the following over the past 24 hours: patient and ER physician   NOTE(S)/MEDICAL RECORDS REVIEWED over the past 24 hours: EPIC      Data     I have personally reviewed the following data over the past 24 hrs:    9.3  \   11.9   / 270     137 98 9.8 /  120 (H)   4.4 21 (L) 0.67 \       ALT: 19 AST: 27 AP: 167 (H) TBILI: 0.3   ALB: 3.9 TOT PROTEIN: 7.2 LIPASE: N/A       TSH: N/A T4: N/A A1C: 11.2 (H)       Procal: N/A CRP: 6.61 (H) Lactic Acid: N/A         Imaging results reviewed over the past 24 hrs:   Recent Results (from the past 24 hour(s))   XR Chest 2 Views    Narrative    XR CHEST 2 VIEWS 4/10/2023 11:29 AM    HISTORY: DKA    COMPARISON: 7/26/2022      Impression    IMPRESSION: No acute cardiopulmonary process.    MARINA RUTLEDGE MD         SYSTEM ID:  [id number]      not applicable (Male)

## 2023-04-10 NOTE — PHARMACY-ADMISSION MEDICATION HISTORY
Pharmacist Admission Medication History    Admission medication history is complete. The information provided in this note is only as accurate as the sources available at the time of the update.    Medication reconciliation/reorder completed by provider prior to medication history? Yes    Information Source(s): Patient and CareEverywhere/SureScripts via in-person    Pertinent Information: Patient states she has not been taking her supplements due to just forgetting.  Recent antimicrobials:  On 2/1/23, patient was prescribed Vancomycin 1250mg QID x14 days for c diff. Patient completed the course of antibiotics.     Changes made to PTA medication list:    Added: ibuprofen and Tylenol    Deleted: Prenatal     Changed: Lantus 15 units to 16 units    Medication Affordability:  Not including over the counter (OTC) medications, was there a time in the past 12 months when you did not take your medications as prescribed because of cost?: No    Allergies reviewed with patient and updates made in EHR: yes    Medication History Completed By: Dianne Escobar [facility] 4/10/2023 1:42 PM    PTA Med List   Medication Sig Last Dose     acetaminophen (TYLENOL) 325 MG tablet Take 325-650 mg by mouth every 6 hours as needed for mild pain      Ashwagandha 500 MG CAPS Take 500 mg by mouth daily Past Month     Fenugreek 500 MG CAPS Take 500 mg by mouth daily Past Month     Glucagon 0.5 MG/0.1ML SOSY Inject 1 ampule Subcutaneous daily as needed      ibuprofen (ADVIL/MOTRIN) 200 MG tablet Take 200 mg by mouth every 4 hours as needed for pain      insulin aspart (NOVOLOG PEN) 100 UNIT/ML pen Inject 1-5 Units Subcutaneous 4 times daily (with meals and nightly) (Patient taking differently: Inject 1-10 Units Subcutaneous 4 times daily (with meals and nightly) ISF: 1 unit per 50 > 150, For -199 give 1 unit. For -249 give 2 units. For -299 give 3 units. For -349 give 4 units. For BG = or > 350 give 5 units.) 4/10/2023 at 10  units @ 0900     insulin glargine (BASAGLAR KWIKPEN) 100 UNIT/ML pen Inject 14 Units Subcutaneous daily At noon (Patient taking differently: Inject 16 Units Subcutaneous daily At noon) 4/9/2023 at 1200     Debo 500 MG CAPS Take 500 mg by mouth daily 4/9/2023 at pm     ondansetron (ZOFRAN ODT) 4 MG ODT tab Take 4 mg by mouth every 6 hours as needed      Probiotic Product (ACIDOPHILUS) CHEW Take 1 tablet by mouth daily Past Month

## 2023-04-11 NOTE — ED NOTES
Pt wants to leave AMA - hospitalist made aware. AMA form signed - pt understands she can return to ED at any point.

## 2023-04-22 ENCOUNTER — HEALTH MAINTENANCE LETTER (OUTPATIENT)
Age: 23
End: 2023-04-22

## 2023-07-15 ENCOUNTER — HEALTH MAINTENANCE LETTER (OUTPATIENT)
Age: 23
End: 2023-07-15

## 2023-10-21 ENCOUNTER — HOSPITAL ENCOUNTER (INPATIENT)
Facility: CLINIC | Age: 23
LOS: 1 days | Discharge: LEFT BEFORE DISCHARGE | DRG: 639 | End: 2023-10-21
Attending: STUDENT IN AN ORGANIZED HEALTH CARE EDUCATION/TRAINING PROGRAM | Admitting: STUDENT IN AN ORGANIZED HEALTH CARE EDUCATION/TRAINING PROGRAM
Payer: COMMERCIAL

## 2023-10-21 VITALS
RESPIRATION RATE: 13 BRPM | HEIGHT: 59 IN | DIASTOLIC BLOOD PRESSURE: 58 MMHG | BODY MASS INDEX: 23.79 KG/M2 | SYSTOLIC BLOOD PRESSURE: 110 MMHG | HEART RATE: 78 BPM | TEMPERATURE: 97 F | OXYGEN SATURATION: 99 % | WEIGHT: 118 LBS

## 2023-10-21 DIAGNOSIS — E10.10 DIABETIC KETOACIDOSIS WITHOUT COMA ASSOCIATED WITH TYPE 1 DIABETES MELLITUS (H): ICD-10-CM

## 2023-10-21 DIAGNOSIS — E83.42 HYPOMAGNESEMIA: ICD-10-CM

## 2023-10-21 LAB
ANION GAP SERPL CALCULATED.3IONS-SCNC: 17 MMOL/L (ref 7–15)
ANION GAP SERPL CALCULATED.3IONS-SCNC: 19 MMOL/L (ref 7–15)
ANION GAP SERPL CALCULATED.3IONS-SCNC: 21 MMOL/L (ref 7–15)
B-OH-BUTYR SERPL-SCNC: 0.2 MMOL/L
B-OH-BUTYR SERPL-SCNC: 1.8 MMOL/L
BASE EXCESS BLDV CALC-SCNC: -2.8 MMOL/L (ref -7.7–1.9)
BASO+EOS+MONOS # BLD AUTO: NORMAL 10*3/UL
BASO+EOS+MONOS NFR BLD AUTO: NORMAL %
BASOPHILS # BLD AUTO: 0 10E3/UL (ref 0–0.2)
BASOPHILS NFR BLD AUTO: 0 %
BUN SERPL-MCNC: 11.6 MG/DL (ref 6–20)
BUN SERPL-MCNC: 11.6 MG/DL (ref 6–20)
BUN SERPL-MCNC: 9.7 MG/DL (ref 6–20)
CALCIUM SERPL-MCNC: 7.9 MG/DL (ref 8.6–10)
CALCIUM SERPL-MCNC: 8 MG/DL (ref 8.6–10)
CALCIUM SERPL-MCNC: 9.1 MG/DL (ref 8.6–10)
CHLORIDE SERPL-SCNC: 106 MMOL/L (ref 98–107)
CHLORIDE SERPL-SCNC: 108 MMOL/L (ref 98–107)
CHLORIDE SERPL-SCNC: 98 MMOL/L (ref 98–107)
CREAT SERPL-MCNC: 0.65 MG/DL (ref 0.51–0.95)
CREAT SERPL-MCNC: 0.66 MG/DL (ref 0.51–0.95)
CREAT SERPL-MCNC: 0.74 MG/DL (ref 0.51–0.95)
DEPRECATED HCO3 PLAS-SCNC: 15 MMOL/L (ref 22–29)
DEPRECATED HCO3 PLAS-SCNC: 17 MMOL/L (ref 22–29)
DEPRECATED HCO3 PLAS-SCNC: 17 MMOL/L (ref 22–29)
EGFRCR SERPLBLD CKD-EPI 2021: >90 ML/MIN/1.73M2
EOSINOPHIL # BLD AUTO: 0 10E3/UL (ref 0–0.7)
EOSINOPHIL NFR BLD AUTO: 0 %
ERYTHROCYTE [DISTWIDTH] IN BLOOD BY AUTOMATED COUNT: 13.8 % (ref 10–15)
GLUCOSE BLDC GLUCOMTR-MCNC: 187 MG/DL (ref 70–99)
GLUCOSE BLDC GLUCOMTR-MCNC: 198 MG/DL (ref 70–99)
GLUCOSE BLDC GLUCOMTR-MCNC: 200 MG/DL (ref 70–99)
GLUCOSE BLDC GLUCOMTR-MCNC: 204 MG/DL (ref 70–99)
GLUCOSE BLDC GLUCOMTR-MCNC: 288 MG/DL (ref 70–99)
GLUCOSE BLDC GLUCOMTR-MCNC: 443 MG/DL (ref 70–99)
GLUCOSE SERPL-MCNC: 178 MG/DL (ref 70–99)
GLUCOSE SERPL-MCNC: 434 MG/DL (ref 70–99)
GLUCOSE SERPL-MCNC: 444 MG/DL (ref 70–99)
HBA1C MFR BLD: 11 %
HCG INTACT+B SERPL-ACNC: <1 MIU/ML
HCO3 BLDV-SCNC: 15 MMOL/L (ref 21–28)
HCO3 BLDV-SCNC: 21 MMOL/L (ref 21–28)
HCT VFR BLD AUTO: 39 % (ref 35–47)
HGB BLD-MCNC: 12.8 G/DL (ref 11.7–15.7)
IMM GRANULOCYTES # BLD: 0 10E3/UL
IMM GRANULOCYTES NFR BLD: 0 %
LACTATE BLD-SCNC: 7.1 MMOL/L
LACTATE SERPL-SCNC: 4.7 MMOL/L (ref 0.7–2)
LIPASE SERPL-CCNC: 16 U/L (ref 13–60)
LYMPHOCYTES # BLD AUTO: 1.2 10E3/UL (ref 0.8–5.3)
LYMPHOCYTES NFR BLD AUTO: 13 %
MAGNESIUM SERPL-MCNC: 1.4 MG/DL (ref 1.7–2.3)
MCH RBC QN AUTO: 27.9 PG (ref 26.5–33)
MCHC RBC AUTO-ENTMCNC: 32.8 G/DL (ref 31.5–36.5)
MCV RBC AUTO: 85 FL (ref 78–100)
MONOCYTES # BLD AUTO: 0.2 10E3/UL (ref 0–1.3)
MONOCYTES NFR BLD AUTO: 3 %
NEUTROPHILS # BLD AUTO: 7.4 10E3/UL (ref 1.6–8.3)
NEUTROPHILS NFR BLD AUTO: 84 %
NRBC # BLD AUTO: 0 10E3/UL
NRBC BLD AUTO-RTO: 0 /100
O2/TOTAL GAS SETTING VFR VENT: 0 %
PCO2 BLDV: 32 MM HG (ref 40–50)
PCO2 BLDV: 32 MM HG (ref 40–50)
PH BLDV: 7.29 [PH] (ref 7.32–7.43)
PH BLDV: 7.42 [PH] (ref 7.32–7.43)
PHOSPHATE SERPL-MCNC: 3.2 MG/DL (ref 2.5–4.5)
PHOSPHATE SERPL-MCNC: 4.2 MG/DL (ref 2.5–4.5)
PLATELET # BLD AUTO: 244 10E3/UL (ref 150–450)
PO2 BLDV: 35 MM HG (ref 25–47)
PO2 BLDV: 77 MM HG (ref 25–47)
POTASSIUM SERPL-SCNC: 3.5 MMOL/L (ref 3.4–5.3)
POTASSIUM SERPL-SCNC: 3.9 MMOL/L (ref 3.4–5.3)
POTASSIUM SERPL-SCNC: 4 MMOL/L (ref 3.4–5.3)
RBC # BLD AUTO: 4.59 10E6/UL (ref 3.8–5.2)
SAO2 % BLDV: 94 % (ref 94–100)
SODIUM SERPL-SCNC: 136 MMOL/L (ref 135–145)
SODIUM SERPL-SCNC: 140 MMOL/L (ref 135–145)
SODIUM SERPL-SCNC: 142 MMOL/L (ref 135–145)
WBC # BLD AUTO: 8.9 10E3/UL (ref 4–11)

## 2023-10-21 PROCEDURE — 250N000012 HC RX MED GY IP 250 OP 636 PS 637: Performed by: STUDENT IN AN ORGANIZED HEALTH CARE EDUCATION/TRAINING PROGRAM

## 2023-10-21 PROCEDURE — 96366 THER/PROPH/DIAG IV INF ADDON: CPT

## 2023-10-21 PROCEDURE — 250N000011 HC RX IP 250 OP 636: Mod: JZ | Performed by: STUDENT IN AN ORGANIZED HEALTH CARE EDUCATION/TRAINING PROGRAM

## 2023-10-21 PROCEDURE — 82962 GLUCOSE BLOOD TEST: CPT

## 2023-10-21 PROCEDURE — 83036 HEMOGLOBIN GLYCOSYLATED A1C: CPT | Performed by: STUDENT IN AN ORGANIZED HEALTH CARE EDUCATION/TRAINING PROGRAM

## 2023-10-21 PROCEDURE — 83690 ASSAY OF LIPASE: CPT | Performed by: STUDENT IN AN ORGANIZED HEALTH CARE EDUCATION/TRAINING PROGRAM

## 2023-10-21 PROCEDURE — 258N000002 HC RX IP 258 OP 250: Performed by: STUDENT IN AN ORGANIZED HEALTH CARE EDUCATION/TRAINING PROGRAM

## 2023-10-21 PROCEDURE — 84702 CHORIONIC GONADOTROPIN TEST: CPT | Performed by: STUDENT IN AN ORGANIZED HEALTH CARE EDUCATION/TRAINING PROGRAM

## 2023-10-21 PROCEDURE — 96361 HYDRATE IV INFUSION ADD-ON: CPT

## 2023-10-21 PROCEDURE — 82010 KETONE BODYS QUAN: CPT | Performed by: STUDENT IN AN ORGANIZED HEALTH CARE EDUCATION/TRAINING PROGRAM

## 2023-10-21 PROCEDURE — 84100 ASSAY OF PHOSPHORUS: CPT | Performed by: STUDENT IN AN ORGANIZED HEALTH CARE EDUCATION/TRAINING PROGRAM

## 2023-10-21 PROCEDURE — 96365 THER/PROPH/DIAG IV INF INIT: CPT

## 2023-10-21 PROCEDURE — 80048 BASIC METABOLIC PNL TOTAL CA: CPT | Performed by: STUDENT IN AN ORGANIZED HEALTH CARE EDUCATION/TRAINING PROGRAM

## 2023-10-21 PROCEDURE — 83735 ASSAY OF MAGNESIUM: CPT | Performed by: STUDENT IN AN ORGANIZED HEALTH CARE EDUCATION/TRAINING PROGRAM

## 2023-10-21 PROCEDURE — 96376 TX/PRO/DX INJ SAME DRUG ADON: CPT

## 2023-10-21 PROCEDURE — 36415 COLL VENOUS BLD VENIPUNCTURE: CPT | Performed by: STUDENT IN AN ORGANIZED HEALTH CARE EDUCATION/TRAINING PROGRAM

## 2023-10-21 PROCEDURE — 36415 COLL VENOUS BLD VENIPUNCTURE: CPT

## 2023-10-21 PROCEDURE — 82803 BLOOD GASES ANY COMBINATION: CPT

## 2023-10-21 PROCEDURE — 99285 EMERGENCY DEPT VISIT HI MDM: CPT | Mod: 25

## 2023-10-21 PROCEDURE — 82803 BLOOD GASES ANY COMBINATION: CPT | Performed by: STUDENT IN AN ORGANIZED HEALTH CARE EDUCATION/TRAINING PROGRAM

## 2023-10-21 PROCEDURE — 83605 ASSAY OF LACTIC ACID: CPT

## 2023-10-21 PROCEDURE — 96375 TX/PRO/DX INJ NEW DRUG ADDON: CPT

## 2023-10-21 PROCEDURE — 99222 1ST HOSP IP/OBS MODERATE 55: CPT | Performed by: STUDENT IN AN ORGANIZED HEALTH CARE EDUCATION/TRAINING PROGRAM

## 2023-10-21 PROCEDURE — 258N000003 HC RX IP 258 OP 636: Performed by: STUDENT IN AN ORGANIZED HEALTH CARE EDUCATION/TRAINING PROGRAM

## 2023-10-21 PROCEDURE — 120N000001 HC R&B MED SURG/OB

## 2023-10-21 PROCEDURE — 85025 COMPLETE CBC W/AUTO DIFF WBC: CPT | Performed by: STUDENT IN AN ORGANIZED HEALTH CARE EDUCATION/TRAINING PROGRAM

## 2023-10-21 PROCEDURE — 250N000009 HC RX 250: Performed by: STUDENT IN AN ORGANIZED HEALTH CARE EDUCATION/TRAINING PROGRAM

## 2023-10-21 RX ORDER — ONDANSETRON 2 MG/ML
4 INJECTION INTRAMUSCULAR; INTRAVENOUS ONCE
Status: COMPLETED | OUTPATIENT
Start: 2023-10-21 | End: 2023-10-21

## 2023-10-21 RX ORDER — LIDOCAINE 40 MG/G
CREAM TOPICAL
Status: CANCELLED | OUTPATIENT
Start: 2023-10-21

## 2023-10-21 RX ORDER — POTASSIUM CHLORIDE 7.45 MG/ML
10 INJECTION INTRAVENOUS
Status: DISCONTINUED | OUTPATIENT
Start: 2023-10-21 | End: 2023-10-21 | Stop reason: HOSPADM

## 2023-10-21 RX ORDER — DEXTROSE MONOHYDRATE 25 G/50ML
25-50 INJECTION, SOLUTION INTRAVENOUS
Status: DISCONTINUED | OUTPATIENT
Start: 2023-10-21 | End: 2023-10-21 | Stop reason: HOSPADM

## 2023-10-21 RX ORDER — SODIUM CHLORIDE 450 MG/100ML
1000 INJECTION, SOLUTION INTRAVENOUS CONTINUOUS
Status: DISCONTINUED | OUTPATIENT
Start: 2023-10-21 | End: 2023-10-21 | Stop reason: HOSPADM

## 2023-10-21 RX ORDER — NICOTINE POLACRILEX 4 MG
15-30 LOZENGE BUCCAL
Status: CANCELLED | OUTPATIENT
Start: 2023-10-21

## 2023-10-21 RX ORDER — NITROGLYCERIN 0.4 MG/1
0.4 TABLET SUBLINGUAL EVERY 5 MIN PRN
Status: CANCELLED | OUTPATIENT
Start: 2023-10-21

## 2023-10-21 RX ORDER — MAGNESIUM SULFATE HEPTAHYDRATE 40 MG/ML
2 INJECTION, SOLUTION INTRAVENOUS ONCE
Status: COMPLETED | OUTPATIENT
Start: 2023-10-21 | End: 2023-10-21

## 2023-10-21 RX ORDER — DEXTROSE MONOHYDRATE 25 G/50ML
25-50 INJECTION, SOLUTION INTRAVENOUS
Status: CANCELLED | OUTPATIENT
Start: 2023-10-21

## 2023-10-21 RX ORDER — METOCLOPRAMIDE HYDROCHLORIDE 5 MG/ML
10 INJECTION INTRAMUSCULAR; INTRAVENOUS ONCE
Status: COMPLETED | OUTPATIENT
Start: 2023-10-21 | End: 2023-10-21

## 2023-10-21 RX ORDER — ESCITALOPRAM OXALATE 5 MG/1
5 TABLET ORAL DAILY
Status: CANCELLED | OUTPATIENT
Start: 2023-10-21

## 2023-10-21 RX ORDER — SODIUM CHLORIDE 450 MG/100ML
1000 INJECTION, SOLUTION INTRAVENOUS CONTINUOUS
Status: DISCONTINUED | OUTPATIENT
Start: 2023-10-21 | End: 2023-10-21

## 2023-10-21 RX ORDER — ONDANSETRON 4 MG/1
4 TABLET, FILM COATED ORAL EVERY 6 HOURS PRN
COMMUNITY
End: 2024-01-29

## 2023-10-21 RX ORDER — ESCITALOPRAM OXALATE 5 MG/1
5 TABLET ORAL DAILY
COMMUNITY

## 2023-10-21 RX ADMIN — METOCLOPRAMIDE 10 MG: 5 INJECTION, SOLUTION INTRAMUSCULAR; INTRAVENOUS at 13:52

## 2023-10-21 RX ADMIN — SODIUM CHLORIDE 1000 ML: 9 INJECTION, SOLUTION INTRAVENOUS at 16:30

## 2023-10-21 RX ADMIN — SODIUM CHLORIDE 5.4 UNITS: 9 INJECTION, SOLUTION INTRAVENOUS at 14:38

## 2023-10-21 RX ADMIN — DEXTROSE AND SODIUM CHLORIDE: 5; 900 INJECTION, SOLUTION INTRAVENOUS at 13:44

## 2023-10-21 RX ADMIN — DEXTROSE AND SODIUM CHLORIDE 1000 ML: 5; 450 INJECTION, SOLUTION INTRAVENOUS at 18:47

## 2023-10-21 RX ADMIN — SODIUM CHLORIDE 1000 ML: 4.5 INJECTION, SOLUTION INTRAVENOUS at 13:19

## 2023-10-21 RX ADMIN — ONDANSETRON 4 MG: 2 INJECTION INTRAMUSCULAR; INTRAVENOUS at 13:47

## 2023-10-21 RX ADMIN — ONDANSETRON 4 MG: 2 INJECTION INTRAMUSCULAR; INTRAVENOUS at 12:18

## 2023-10-21 RX ADMIN — MAGNESIUM SULFATE HEPTAHYDRATE 2 G: 40 INJECTION, SOLUTION INTRAVENOUS at 13:43

## 2023-10-21 RX ADMIN — INSULIN HUMAN: 1 INJECTION, SOLUTION INTRAVENOUS at 16:31

## 2023-10-21 RX ADMIN — SODIUM CHLORIDE 1000 ML: 9 INJECTION, SOLUTION INTRAVENOUS at 12:15

## 2023-10-21 ASSESSMENT — ACTIVITIES OF DAILY LIVING (ADL)
ADLS_ACUITY_SCORE: 35

## 2023-10-21 NOTE — PHARMACY-ADMISSION MEDICATION HISTORY
Pharmacist Admission Medication History    Admission medication history is complete. The information provided in this note is only as accurate as the sources available at the time of the update.    Information Source(s): Patient and CareEverywhere/SureScripts via in-person    Pertinent Information: none    Changes made to PTA medication list:  Added: escitalopram, artificial tears  Deleted: None  Changed: insulin doses    Medication Affordability:  Not including over the counter (OTC) medications, was there a time in the past 3 months when you did not take your medications as prescribed because of cost?: Yes    Allergies reviewed with patient and updates made in EHR: yes    Medication History Completed By: Mayela Gomez [facility] 10/21/2023 5:49 PM    PTA Med List   Medication Sig Last Dose    acetaminophen (TYLENOL) 325 MG tablet Take 325-650 mg by mouth every 6 hours as needed for mild pain prn    Ashwagandha 500 MG CAPS Take 500 mg by mouth daily Past Week    escitalopram (LEXAPRO) 5 MG tablet Take 5 mg by mouth daily 10/20/2023 at am    Fenugreek 500 MG CAPS Take 500 mg by mouth daily Past Week    hypromellose (ARTIFICIAL TEARS) 0.5 % SOLN ophthalmic solution Place 1 drop into both eyes every hour as needed for dry eyes prn    insulin aspart (NOVOLOG PEN) 100 UNIT/ML pen Inject 1-5 Units Subcutaneous 4 times daily (with meals and nightly) (Patient taking differently: Inject 1-10 Units Subcutaneous 4 times daily (with meals and nightly) ISF: 1 unit per 50 > 150, For -199 give 1 unit. For -249 give 2 units. For -299 give 3 units. For -349 give 4 units. For BG = or > 350 give 5 units.    1 unit for 7 CHO for breakfast, 1 unit for 10 CHO for lunch/dinner) 10/21/2023 at am    insulin glargine (BASAGLAR KWIKPEN) 100 UNIT/ML pen Inject 14 Units Subcutaneous daily At noon (Patient taking differently: Inject 15 Units Subcutaneous every 24 hours At noon) 10/20/2023 at noon    Debo 500 MG CAPS Take 500 mg  by mouth daily Past Month    ondansetron (ZOFRAN) 4 MG tablet Take 4 mg by mouth every 6 hours as needed for nausea prn    Probiotic Product (ACIDOPHILUS) CHEW Take 1 tablet by mouth daily 10/20/2023 at am

## 2023-10-21 NOTE — ED PROVIDER NOTES
History     Chief Complaint:  Nausea & Vomiting and Hyperglycemia       HPI   Myriam Wylie is a 23 year old female history of type 1 diabetes on insulin, presents with concerns of DKA.  Patient had a cold recent with runny nose and sore throat and cough but that is improving.  Did not have a fever.  Last night she had 5 shots of alcohol.  This morning at 4 AM she woke up with vomiting, blood sugars in the 500s and feeling unwell.  Has generalized abdominal soreness as well.  Has not missed any doses of insulin.  She does insulin injections and not a pump.  No drug use.  No tobacco use.  No confusion per mother who is also present here.  Patient denies being pregnant.      Independent Historian:    none    Review of External Notes:  Office visit September 11, 2023.    Medications:    acetaminophen (TYLENOL) 325 MG tablet  acetone, Urine, test STRP  Ashwagandha 500 MG CAPS  blood glucose monitoring (ACCU-CHEK FASTCLIX) lancets  blood glucose monitoring (ACCU-CHEK HENRI SMARTVIEW) meter device kit  blood glucose monitoring (ACCU-CHEK SMARTVIEW) test strip  Fenugreek 500 MG CAPS  Glucagon 0.5 MG/0.1ML SOSY  ibuprofen (ADVIL/MOTRIN) 200 MG tablet  insulin aspart (NOVOLOG PEN) 100 UNIT/ML pen  insulin glargine (BASAGLAR KWIKPEN) 100 UNIT/ML pen  insulin pen needle (BD HENRI U/F) 32G X 4 MM  Debo 500 MG CAPS  ondansetron (ZOFRAN ODT) 4 MG ODT tab  Probiotic Product (ACIDOPHILUS) CHEW        Past Medical History:    Past Medical History:   Diagnosis Date    C. difficile colitis     Diabetes type 1, uncontrolled     DKA (diabetic ketoacidosis) (H)     PID (acute pelvic inflammatory disease)        Past Surgical History:    Past Surgical History:   Procedure Laterality Date    COLONOSCOPY N/A 9/18/2019    Procedure: COLONOSCOPY;  Surgeon: Jeremi Banks MD;  Location: [facility]     ESOPHAGOSCOPY, GASTROSCOPY, DUODENOSCOPY (EGD), COMBINED N/A 9/18/2019    Procedure: Upper endoscopy and colonoscopy with biopsy;   "Surgeon: Jeremi Banks MD;  Location: [facility]           Physical Exam   Patient Vitals for the past 24 hrs:   BP Temp Temp src Pulse Resp SpO2 Height Weight   10/21/23 1637 109/61 -- -- 104 16 -- -- --   10/21/23 1322 110/66 -- -- -- -- 99 % -- --   10/21/23 1141 (!) 145/73 -- -- -- -- -- -- --   10/21/23 1134 -- 97  F (36.1  C) Temporal 74 16 100 % 1.499 m (4' 11\") 53.5 kg (118 lb)        Physical Exam  GENERAL: Patient is retching.  Appears nauseous and fatigued.  HEAD: Atraumatic.  NECK: No rigidity  CV: RRR, no murmurs rubs or gallops  PULM: CTAB with good aeration; no retractions, rales, rhonchi, or wheezing  ABD: Soft, mild generalized tenderness but most notably in the epigastrium, nondistended, no guarding  DERM: No rash. Skin warm and dry  EXTREMITY: Moving all extremities without difficulty.           Emergency Department Course        Imaging:  No orders to display          Laboratory:  Labs Ordered and Resulted from Time of ED Arrival to Time of ED Departure   HEMOGLOBIN A1C - Abnormal       Result Value    Hemoglobin A1C 11.0 (*)    BASIC METABOLIC PANEL - Abnormal    Sodium 136      Potassium 4.0      Chloride 98      Carbon Dioxide (CO2) 17 (*)     Anion Gap 21 (*)     Urea Nitrogen 11.6      Creatinine 0.66      GFR Estimate >90      Calcium 9.1      Glucose 444 (*)    MAGNESIUM - Abnormal    Magnesium 1.4 (*)    BLOOD GAS VENOUS - Abnormal    pH Venous 7.42      pCO2 Venous 32 (*)     pO2 Venous 35      Bicarbonate Venous 21      Base Excess/Deficit -2.8      FIO2 0     KETONE BETA-HYDROXYBUTYRATE QUANTITATIVE, RAPID - Abnormal    Ketone (Beta-Hydroxybutyrate) Quantitative 1.80 (*)    GLUCOSE BY METER - Abnormal    GLUCOSE BY METER POCT 443 (*)    BASIC METABOLIC PANEL - Abnormal    Sodium 140      Potassium 3.5      Chloride 106      Carbon Dioxide (CO2) 15 (*)     Anion Gap 19 (*)     Urea Nitrogen 11.6      Creatinine 0.65      GFR Estimate >90      Calcium 8.0 (*)     Glucose 434 (*)  "   GLUCOSE BY METER - Abnormal    GLUCOSE BY METER POCT 288 (*)    ISTAT GASES LACTATE VENOUS POCT - Abnormal    Lactic Acid POCT 7.1 (*)     Bicarbonate Venous POCT 15 (*)     O2 Sat, Venous POCT 94      pCO2 Venous POCT 32 (*)     pH Venous POCT 7.29 (*)     pO2 Venous POCT 77 (*)    GLUCOSE BY METER - Abnormal    GLUCOSE BY METER POCT 198 (*)    PHOSPHORUS - Normal    Phosphorus 3.2     HCG QUANTITATIVE PREGNANCY - Normal    hCG Quantitative <1     LIPASE - Normal    Lipase 16     PHOSPHORUS - Normal    Phosphorus 4.2     CBC WITH PLATELETS AND DIFFERENTIAL    WBC Count 8.9      RBC Count 4.59      Hemoglobin 12.8      Hematocrit 39.0      MCV 85      MCH 27.9      MCHC 32.8      RDW 13.8      Platelet Count 244      % Neutrophils 84      % Lymphocytes 13      % Monocytes 3      Mids % (Monos, Eos, Basos)        % Eosinophils 0      % Basophils 0      % Immature Granulocytes 0      NRBCs per 100 WBC 0      Absolute Neutrophils 7.4      Absolute Lymphocytes 1.2      Absolute Monocytes 0.2      Mids Abs (Monos, Eos, Basos)        Absolute Eosinophils 0.0      Absolute Basophils 0.0      Absolute Immature Granulocytes 0.0      Absolute NRBCs 0.0     GLUCOSE MONITOR NURSING POCT   GLUCOSE MONITOR NURSING POCT   BLOOD GAS VENOUS   GLUCOSE MONITOR NURSING POCT   C. DIFFICILE TOXIN B PCR WITH REFLEX TO C. DIFFICILE ANTIGEN AND TOXINS A/B EIA             Emergency Department Course & Assessments:             Interventions:  Medications   sodium chloride 0.9% BOLUS 1,000 mL (0 mLs Intravenous Stopped 10/21/23 1345)     Followed by   0.45% sodium chloride infusion (0 mLs Intravenous Stopped 10/21/23 1345)   dextrose 5% and 0.45% NaCl infusion (has no administration in time range)   dextrose 5% and 0.9% NaCl infusion (0 mLs Intravenous Stopped 10/21/23 1519)   dextrose 5% and 0.45% NaCl infusion (has no administration in time range)   dextrose 50 % injection 25-50 mL (has no administration in time range)   potassium chloride  10 mEq in 100 mL sterile water infusion (has no administration in time range)   insulin regular (MYXREDLIN) 1 unit/mL infusion ( Intravenous Rate/Dose Change 10/21/23 1720)   dextrose 50 % injection 25-50 mL (has no administration in time range)   ondansetron (ZOFRAN) injection 4 mg (4 mg Intravenous $Given 10/21/23 1218)   magnesium sulfate 2 g in 50 mL sterile water intermittent infusion (0 g Intravenous Stopped 10/21/23 1458)   ondansetron (ZOFRAN) injection 4 mg (4 mg Intravenous $Given 10/21/23 1347)   metoclopramide (REGLAN) injection 10 mg (10 mg Intravenous $Given 10/21/23 1352)   insulin regular 1 unit/mL injection 5.4 Units (5.4 Units Intravenous $Given 10/21/23 1438)   sodium chloride 0.9% BOLUS 1,000 mL (1,000 mLs Intravenous $New Bag 10/21/23 1630)           Independent Interpretation (X-rays, CTs, rhythm strip):  None    Consultations/Discussion of Management or Tests:  Hospitalist Dr. Mckeon       Social Determinants of Health affecting care:  none     Disposition:  The patient was admitted to the hospital under the care of Dr. Hospitalist Dr. Mckeon.     Impression & Plan    CMS Diagnoses: Lactate elevation is due to dehydration and DKA and do not suspect sepsis.    Medical Decision Making:  Symptoms consistent with DKA.     Chronic condition complicating - diabetes    Bedside BS elevated. Promptly placed in a room and IV access obtained.     DDx considered, but not limited to HHS, sepsis, ischemia.    Labs initially showing normal CBC and initial pH was 7.42 with bicarbonate 21.  BMP with anion gap 21 and CO2 of 17.  Ketones were mildly elevated at 1.8.  With the initial normal pH, DKA seemed less likely and the metabolic derangements could have been secondary to repetitive vomiting and a starvation ketosis.  Therefore patient was given IV fluids.  She was given antiemetics.  I also gave her D5 NS to combat a starvation ketosis and 0.1 units/kg regular insulin.  Repeat BMP showing improved  anion gap to 19, but CO2 did worsen to 15.  Patient however was feeling improved.  Repeated VBG and pH is 7.29 now with bicarbonate 15.  Thus, consistent with DKA.    Followed DKA order set.    Given IV NS. Repleted potassium and magnesium. Started IV insulin drip.     Not showing evidence of cerebral edema and thus CT head not indicated.    Discussed with hospitalist who accepted patient to the stepdown unit.      Critical Care  Critical Care is defined as an illness or injury that acutely impairs one or more vital organ systems such that there is a high probability of imminent or life-threatening deterioration in the patient's condition and that the failure to initiate these interventions on an urgent basis would likely result in sudden, clinically significant or life-threatening deterioration in the patient's condition.    The critical condition was: DKA    Critical interventions performed or strongly considered: Infusion: naloxone, vasopressors, insulin, chronotropics, ionotropics, antiepileptics    Critical care time was 60 minutes exclusive of time spent on separately billable procedures.    For purposes of time:  Procedures included in CC time: interpretation of NICOM, CXR, SpO2, VBG/ABG, interpretation of physiologic data, OG placement, temporary transcutaneous/transvenous pacing, ventilator management  Common separately billable procedures (not included in CC time): CPR, wound repair, endotracheal intubation, central line placement, intraosseous placement, tube thoracostomy, temporary transvenous pacemaker, EKG, electrical cardioversion      Diagnosis:    ICD-10-CM    1. Diabetic ketoacidosis without coma associated with type 1 diabetes mellitus (H)  E10.10            Discharge Medications:  New Prescriptions    No medications on file          Chepe Shafer MD  10/21/2023   Chepe Shafer MD Foss, Kevin, MD  10/21/23 [id number]

## 2023-10-21 NOTE — H&P
[facility]  History and Physical - Hospitalist Service       Date of Admission:  10/21/2023  PRIMARY CARE PROVIDER:    No Ref-Primary, Physician    Assessment & Plan   Myriam Wylie is a 23 year old female with a past medical history significant for T1DM with hx of DKA, hx of C diff colitis and depression who presented to the ED on 10/21/23 for nausea, vomiting and elevated glucose.     DKA  Uncontrolled T1DM   AG Metabolic Acidosis   Lactic Acidosis   Patient has had a couple stressors recently with a few illnesses, new med start and drinking alcohol. Initial labs with elevated serum ketones, low bicarb, elevated AG, elevated glucose and elevated lactic acid. Most recently admitted for DKA in 2023 and 2023. During her 2023 admission she left AMA.      - A1c: 11    - LA: 7.1     - Likely related to DKA    - Repeat ordered    - A > 19    - Ketone: 1.80      - Telemetry/IMC      - Continue Insulin and IVF per DKA protocol      - BMP and Ketones Q2H    - Glucose checks per DKA protocol     Diarrhea  Hx of C Diff Colitis   Patient states that she was diagnosed with Norovirus a couple weeks ago but her symptoms resolved. For the past couple of days she has had watery stools roughly 3 times per day. Feels similar when she had Cdiff. Per chart review, she has had multiple bouts of Cdiff with multiple round of abx treatment. No recent abx use. She is afebrile has no elevated WBC but does have an elevated LA which could be from DKA.   - C diff PCR ordered    - Contact precautions     Chronic Medical Problems:     Depression   - Continue home Lexapro 5mg     Clinically Significant Risk Factors Present on Admission            # Hypomagnesemia: Lowest Mg = 1.4 mg/dL in last 2 days, will replace as needed  # Anion Gap Metabolic Acidosis: Highest Anion Gap = 21 mmol/L in last 2 days, will monitor and treat as appropriate                           Diet:  NPO with ice chips   DVT Prophylaxis:  Pneumatic Compression Devices  Martines Catheter: Not present  Lines: None     Cardiac Monitoring: None  Code Status:  Full         Disposition Plan      Expected Discharge Date: 10/23/2023             Entered: Kari Mckeon MD 10/21/2023, 6:00 PM       Kari Mckeon MD  Hospitalist Service  [facility]  Securely message with the Vocera Web Console (learn more here)  Text page via [employer] Paging/Directory  ______________________________________________________    Chief Complaint   Nausea, vomiting, elevated glucose    History is obtained from the patient and EMR.      History of Present Illness   Myriam Wylie is a 23 year old female with a past medical history significant for T1DM on insulin with hx of DKA, and depression who presented to the ED on 10/21/23 for nausea, vomiting and elevated glucose.     That patient states that she went out for a concert last night with her friends. They were drinking. She states that she had 5 shots of alcohol. She took her insulin yesterday and has not missed any doses. Woke up this morning around 4am with vomiting, abdominal soreness and blood sugars in the 500s. States that she felt like she was in DKA so she came in.     Does mention that she has been feeling under the weather for the past couple of weeks. States she was diagnosed with Norovirus a few weeks ago but her symptoms have since resolved. Started having cold like symptoms with a runny nose and sore throat but this has been improving. Does mention that for the past couple of days she has been having watery stools about 3 times a day. She feel that this is similar to when she had Cdiff. Denies fever or chills. No recent abx use.    In the ED:   Vitals upon arrival:   Temp: 97F, BP: 145/73, HR: 74, RR: 16, Spo2: 100% on RA    Labs:     @1213:   Na: 136, K: 4, Cl: 98, CO2: 17, M.4, A  BUN: 11.6, Cr: 0.66    Glucose: 444, A1c: 11, Ketone: 1.80     Lipase: 16     WBC: 8.9, Hgb:  12.8, Plt: 244     VB.42/32/35/21     In the ED, she was given 5.4units of regular insulin, 1L NS, Reglan, zofran and 2g Mg and D5W with NS. Labs were then repeated.     @1519:   Na: 140, K: 3.5, Cl: 106, CO2: 15, A   BUN: 11.6, Cr: 0.65     Glucose: 434     VB.29/32/77/94    Lactic acid: 7.1     She was then started on an Insulin ggt and DKA protocol.     Upon my evaluation, the patient was laying in bed with her mom at bedside. She states that she is feeling better than when she first came in. No longer having nausea and vomiting. Abdominal soreness is improved.     She mentions that she recently started 5mg of Lexapro for her depression.     Past Medical History    I have reviewed this patient's medical history and updated it with pertinent information if needed.   Past Medical History:   Diagnosis Date    C. difficile colitis     Diabetes type 1, uncontrolled     DKA (diabetic ketoacidosis) (H)     PID (acute pelvic inflammatory disease)        Prior to Admission Medications   Prior to Admission Medications   Prescriptions Last Dose Informant Patient Reported? Taking?   Ashwagandha 500 MG CAPS Past Week Self Yes Yes   Sig: Take 500 mg by mouth daily   Fenugreek 500 MG CAPS Past Week Self Yes Yes   Sig: Take 500 mg by mouth daily   Glucagon 0.5 MG/0.1ML SOSY prn Self No No   Sig: Inject 1 ampule Subcutaneous daily as needed   Debo 500 MG CAPS Past Month Self Yes Yes   Sig: Take 500 mg by mouth daily   Probiotic Product (ACIDOPHILUS) CHEW 10/20/2023 at am Self Yes Yes   Sig: Take 1 tablet by mouth daily   acetaminophen (TYLENOL) 325 MG tablet prn Self Yes Yes   Sig: Take 325-650 mg by mouth every 6 hours as needed for mild pain   acetone, Urine, test STRP   No No   Si strip by In Vitro route as needed   blood glucose monitoring (ACCU-CHEK FASTCLIX) lancets   No No   Sig: Use to test blood sugar 6 times daily or as directed.   blood glucose monitoring (ACCU-CHEK HENRI SMARTVIEW) meter device kit    No No   Sig: Use to test blood sugar 6 times daily or as directed.   blood glucose monitoring (ACCU-CHEK SMARTVIEW) test strip   No No   Sig: Use to test blood sugar 6 times daily or as directed.   escitalopram (LEXAPRO) 5 MG tablet 10/20/2023 at am Self Yes Yes   Sig: Take 5 mg by mouth daily   hypromellose (ARTIFICIAL TEARS) 0.5 % SOLN ophthalmic solution prn Self Yes Yes   Sig: Place 1 drop into both eyes every hour as needed for dry eyes   ibuprofen (ADVIL/MOTRIN) 200 MG tablet prn Self Yes No   Sig: Take 200 mg by mouth every 4 hours as needed for pain   insulin aspart (NOVOLOG PEN) 100 UNIT/ML pen 10/21/2023 at am Self No Yes   Sig: Inject 1-5 Units Subcutaneous 4 times daily (with meals and nightly)   Patient taking differently: Inject 1-10 Units Subcutaneous 4 times daily (with meals and nightly) ISF: 1 unit per 50 > 150, For -199 give 1 unit. For -249 give 2 units. For -299 give 3 units. For -349 give 4 units. For BG = or > 350 give 5 units.    1 unit for 7 CHO for breakfast, 1 unit for 10 CHO for lunch/dinner   insulin glargine (BASAGLAR KWIKPEN) 100 UNIT/ML pen 10/20/2023 at noon Self No Yes   Sig: Inject 14 Units Subcutaneous daily At noon   Patient taking differently: Inject 15 Units Subcutaneous every 24 hours At noon   insulin pen needle (BD HENRI U/F) 32G X 4 MM   No No   Sig: Use 6 pen needles daily or as directed.   ondansetron (ZOFRAN) 4 MG tablet prn Self Yes Yes   Sig: Take 4 mg by mouth every 6 hours as needed for nausea      Facility-Administered Medications: None     Allergies   No Known Allergies    Physical Exam   Vital Signs: Temp: 97  F (36.1  C) Temp src: Temporal BP: 109/61 Pulse: 104   Resp: 16 SpO2: 99 %      Weight: 118 lbs 0 oz    Constitutional: Awake, alert, cooperative, mildly ill appreaing  ENT: Normocephalic, without obvious abnormality, atraumatic, oral pharynx with dry mucus membranes.  Eyes pupils are equal, round and reactive to light; extra occular  movements intact.  Normal sclera.    Pulmonary: No increased work of breathing, good air exchange, clear to auscultation bilaterally, no crackles or wheezing.  Cardiovascular: Slight tachycardia, normal S1 and S2, no S3 or S4, and no murmur noted.  GI: Normal bowel sounds, soft, non-distended, non-tender.  Obese.  Skin/Integumen: Visualized skin appeared clear.  Neuro: moves all 4 extremities   Psych:  Alert and oriented x 3. Normal affect.  Extremities: No lower extremity edema noted, and calves are non-tender to palpation bilaterally. Dorsal pedal pulses and posterior tibial pulses palpable.        Medical Decision Making       Please see A&P for additional details of medical decision making.  65 MINUTES SPENT BY ME on the date of service doing chart review, history, exam, documentation & further activities per the note.         Data   Data reviewed today: I reviewed all medications, new labs and imaging results over the last 24 hours. I personally reviewed no images or EKG's today.      I have personally reviewed the following data over the past 24 hrs:    8.9  \   12.8   / 244     140 106 11.6 /  198 (H)   3.5 15 (L) 0.65 \     ALT: N/A AST: N/A AP: N/A TBILI: N/A   ALB: N/A TOT PROTEIN: N/A LIPASE: 16     TSH: N/A T4: N/A A1C: 11.0 (H)     Procal: N/A CRP: N/A Lactic Acid: 7.1 (HH)         Imaging results reviewed over the past 24 hrs:   No results found for this or any previous visit (from the past 24 hour(s)).

## 2023-10-21 NOTE — ED TRIAGE NOTES
N/V started around 4am, last glucose around 500. Pt states she feels lightheaded and is hyperventilating in triage.      Triage Assessment (Adult)       Row Name 10/21/23 1139          Triage Assessment    Airway WDL WDL        Respiratory WDL    Respiratory WDL WDL        Skin Circulation/Temperature WDL    Skin Circulation/Temperature WDL WDL        Cardiac WDL    Cardiac WDL WDL        Peripheral/Neurovascular WDL    Peripheral Neurovascular WDL WDL        Cognitive/Neuro/Behavioral WDL    Cognitive/Neuro/Behavioral WDL WDL

## 2023-10-22 NOTE — PROGRESS NOTES
Brief Hospitalist Progress Note:    Was notified by nursing that patient was requesting ot leave AMA. I went and spoke to patient who stated that she was feeling better and did not feel that she needed to be admitted anymore. Explained why she should stay and let us continue to monitor her due to her DKA. Patient stated that she understood and that she has gone through DKA before. Did inform her she would be leaving against medical advice and she stated she was okay with that. Her mom was present during this conversation. Nursing was notified and patient signed AMA paperwork.     Kari Mckeon MD  Hospitalist Service  [facility]  Securely message with the Vocera Web Console (learn more here)  Text page via [employer] Paging/Directory

## 2024-01-29 ENCOUNTER — APPOINTMENT (OUTPATIENT)
Dept: CT IMAGING | Facility: CLINIC | Age: 24
DRG: 872 | End: 2024-01-29
Attending: EMERGENCY MEDICINE
Payer: COMMERCIAL

## 2024-01-29 ENCOUNTER — HOSPITAL ENCOUNTER (EMERGENCY)
Facility: CLINIC | Age: 24
Discharge: HOME | DRG: 872 | End: 2024-01-29
Attending: EMERGENCY MEDICINE | Admitting: EMERGENCY MEDICINE
Payer: COMMERCIAL

## 2024-01-29 VITALS
WEIGHT: 120 LBS | TEMPERATURE: 101.5 F | OXYGEN SATURATION: 100 % | DIASTOLIC BLOOD PRESSURE: 84 MMHG | HEART RATE: 111 BPM | HEIGHT: 59 IN | BODY MASS INDEX: 24.19 KG/M2 | SYSTOLIC BLOOD PRESSURE: 111 MMHG | RESPIRATION RATE: 16 BRPM

## 2024-01-29 DIAGNOSIS — N10 PYELONEPHRITIS, ACUTE: ICD-10-CM

## 2024-01-29 DIAGNOSIS — R11.2 NAUSEA AND VOMITING, UNSPECIFIED VOMITING TYPE: Primary | ICD-10-CM

## 2024-01-29 DIAGNOSIS — R73.9 HYPERGLYCEMIA: ICD-10-CM

## 2024-01-29 DIAGNOSIS — N10 ACUTE PYELONEPHRITIS: ICD-10-CM

## 2024-01-29 LAB
ALBUMIN SERPL BCG-MCNC: 3.9 G/DL (ref 3.5–5.2)
ALBUMIN UR-MCNC: 20 MG/DL
ALP SERPL-CCNC: 172 U/L (ref 40–150)
ALT SERPL W P-5'-P-CCNC: 8 U/L (ref 0–50)
ANION GAP SERPL CALCULATED.3IONS-SCNC: 14 MMOL/L (ref 7–15)
APPEARANCE UR: CLEAR
AST SERPL W P-5'-P-CCNC: 14 U/L (ref 0–45)
B-OH-BUTYR SERPL-SCNC: 0.6 MMOL/L
BACTERIA #/AREA URNS HPF: ABNORMAL /HPF
BASE EXCESS BLDV CALC-SCNC: -3.5 MMOL/L (ref -3–3)
BASOPHILS # BLD AUTO: 0 10E3/UL (ref 0–0.2)
BASOPHILS NFR BLD AUTO: 0 %
BILIRUB SERPL-MCNC: 0.5 MG/DL
BILIRUB UR QL STRIP: NEGATIVE
BUN SERPL-MCNC: 8 MG/DL (ref 6–20)
CALCIUM SERPL-MCNC: 8.9 MG/DL (ref 8.6–10)
CHLORIDE SERPL-SCNC: 99 MMOL/L (ref 98–107)
COLOR UR AUTO: ABNORMAL
CREAT SERPL-MCNC: 0.78 MG/DL (ref 0.51–0.95)
DEPRECATED HCO3 PLAS-SCNC: 22 MMOL/L (ref 22–29)
EGFRCR SERPLBLD CKD-EPI 2021: >90 ML/MIN/1.73M2
EOSINOPHIL # BLD AUTO: 0 10E3/UL (ref 0–0.7)
EOSINOPHIL NFR BLD AUTO: 0 %
ERYTHROCYTE [DISTWIDTH] IN BLOOD BY AUTOMATED COUNT: 14 % (ref 10–15)
FLUAV RNA SPEC QL NAA+PROBE: NEGATIVE
FLUBV RNA RESP QL NAA+PROBE: NEGATIVE
GLUCOSE SERPL-MCNC: 311 MG/DL (ref 70–99)
GLUCOSE UR STRIP-MCNC: >=1000 MG/DL
HBA1C MFR BLD: 10.3 %
HCG SERPL QL: NEGATIVE
HCO3 BLDV-SCNC: 21 MMOL/L (ref 21–28)
HCT VFR BLD AUTO: 35.8 % (ref 35–47)
HGB BLD-MCNC: 11.6 G/DL (ref 11.7–15.7)
HGB UR QL STRIP: ABNORMAL
HOLD SPECIMEN: NORMAL
HOLD SPECIMEN: NORMAL
IMM GRANULOCYTES # BLD: 0.1 10E3/UL
IMM GRANULOCYTES NFR BLD: 1 %
KETONES UR STRIP-MCNC: 20 MG/DL
LACTATE SERPL-SCNC: 0.9 MMOL/L (ref 0.7–2)
LACTATE SERPL-SCNC: 2.3 MMOL/L (ref 0.7–2)
LEUKOCYTE ESTERASE UR QL STRIP: ABNORMAL
LIPASE SERPL-CCNC: 11 U/L (ref 13–60)
LYMPHOCYTES # BLD AUTO: 2 10E3/UL (ref 0.8–5.3)
LYMPHOCYTES NFR BLD AUTO: 11 %
MCH RBC QN AUTO: 28.4 PG (ref 26.5–33)
MCHC RBC AUTO-ENTMCNC: 32.4 G/DL (ref 31.5–36.5)
MCV RBC AUTO: 88 FL (ref 78–100)
MONOCYTES # BLD AUTO: 1.4 10E3/UL (ref 0–1.3)
MONOCYTES NFR BLD AUTO: 8 %
NEUTROPHILS # BLD AUTO: 13.9 10E3/UL (ref 1.6–8.3)
NEUTROPHILS NFR BLD AUTO: 80 %
NITRATE UR QL: NEGATIVE
NRBC # BLD AUTO: 0 10E3/UL
NRBC BLD AUTO-RTO: 0 /100
O2/TOTAL GAS SETTING VFR VENT: 0 %
OXYHGB MFR BLDV: 54 % (ref 70–75)
PCO2 BLDV: 37 MM HG (ref 40–50)
PH BLDV: 7.37 [PH] (ref 7.32–7.43)
PH UR STRIP: 6.5 [PH] (ref 5–7)
PLATELET # BLD AUTO: 258 10E3/UL (ref 150–450)
PO2 BLDV: 31 MM HG (ref 25–47)
POTASSIUM SERPL-SCNC: 4.1 MMOL/L (ref 3.4–5.3)
PROT SERPL-MCNC: 7.8 G/DL (ref 6.4–8.3)
RBC # BLD AUTO: 4.08 10E6/UL (ref 3.8–5.2)
RBC URINE: 5 /HPF
RSV RNA SPEC NAA+PROBE: NEGATIVE
SAO2 % BLDV: 54.6 % (ref 70–75)
SARS-COV-2 RNA RESP QL NAA+PROBE: NEGATIVE
SODIUM SERPL-SCNC: 135 MMOL/L (ref 135–145)
SP GR UR STRIP: 1.02 (ref 1–1.03)
SQUAMOUS EPITHELIAL: 1 /HPF
UROBILINOGEN UR STRIP-MCNC: NORMAL MG/DL
WBC # BLD AUTO: 17.4 10E3/UL (ref 4–11)
WBC URINE: 46 /HPF

## 2024-01-29 PROCEDURE — 83605 ASSAY OF LACTIC ACID: CPT | Performed by: EMERGENCY MEDICINE

## 2024-01-29 PROCEDURE — 82010 KETONE BODYS QUAN: CPT | Performed by: EMERGENCY MEDICINE

## 2024-01-29 PROCEDURE — 84703 CHORIONIC GONADOTROPIN ASSAY: CPT | Performed by: EMERGENCY MEDICINE

## 2024-01-29 PROCEDURE — 250N000009 HC RX 250: Performed by: EMERGENCY MEDICINE

## 2024-01-29 PROCEDURE — 120N000001 HC R&B MED SURG/OB

## 2024-01-29 PROCEDURE — 96375 TX/PRO/DX INJ NEW DRUG ADDON: CPT

## 2024-01-29 PROCEDURE — 258N000003 HC RX IP 258 OP 636: Performed by: EMERGENCY MEDICINE

## 2024-01-29 PROCEDURE — 96361 HYDRATE IV INFUSION ADD-ON: CPT

## 2024-01-29 PROCEDURE — 83036 HEMOGLOBIN GLYCOSYLATED A1C: CPT

## 2024-01-29 PROCEDURE — 96365 THER/PROPH/DIAG IV INF INIT: CPT | Mod: 59

## 2024-01-29 PROCEDURE — 81001 URINALYSIS AUTO W/SCOPE: CPT | Performed by: EMERGENCY MEDICINE

## 2024-01-29 PROCEDURE — 87186 SC STD MICRODIL/AGAR DIL: CPT | Performed by: EMERGENCY MEDICINE

## 2024-01-29 PROCEDURE — 99223 1ST HOSP IP/OBS HIGH 75: CPT

## 2024-01-29 PROCEDURE — 87637 SARSCOV2&INF A&B&RSV AMP PRB: CPT | Performed by: EMERGENCY MEDICINE

## 2024-01-29 PROCEDURE — 85025 COMPLETE CBC W/AUTO DIFF WBC: CPT | Performed by: EMERGENCY MEDICINE

## 2024-01-29 PROCEDURE — 99285 EMERGENCY DEPT VISIT HI MDM: CPT | Mod: 25

## 2024-01-29 PROCEDURE — 250N000011 HC RX IP 250 OP 636: Performed by: EMERGENCY MEDICINE

## 2024-01-29 PROCEDURE — 82040 ASSAY OF SERUM ALBUMIN: CPT | Performed by: EMERGENCY MEDICINE

## 2024-01-29 PROCEDURE — 82805 BLOOD GASES W/O2 SATURATION: CPT | Performed by: EMERGENCY MEDICINE

## 2024-01-29 PROCEDURE — 250N000013 HC RX MED GY IP 250 OP 250 PS 637: Performed by: EMERGENCY MEDICINE

## 2024-01-29 PROCEDURE — 36415 COLL VENOUS BLD VENIPUNCTURE: CPT | Performed by: EMERGENCY MEDICINE

## 2024-01-29 PROCEDURE — 83690 ASSAY OF LIPASE: CPT | Performed by: EMERGENCY MEDICINE

## 2024-01-29 PROCEDURE — 87040 BLOOD CULTURE FOR BACTERIA: CPT | Performed by: EMERGENCY MEDICINE

## 2024-01-29 PROCEDURE — 74177 CT ABD & PELVIS W/CONTRAST: CPT

## 2024-01-29 PROCEDURE — 87086 URINE CULTURE/COLONY COUNT: CPT | Performed by: EMERGENCY MEDICINE

## 2024-01-29 RX ORDER — SODIUM CHLORIDE 9 MG/ML
INJECTION, SOLUTION INTRAVENOUS CONTINUOUS
Status: CANCELLED | OUTPATIENT
Start: 2024-01-29

## 2024-01-29 RX ORDER — ACETAMINOPHEN 650 MG/1
650 SUPPOSITORY RECTAL EVERY 4 HOURS PRN
Status: CANCELLED | OUTPATIENT
Start: 2024-01-29

## 2024-01-29 RX ORDER — ONDANSETRON 4 MG/1
4 TABLET, ORALLY DISINTEGRATING ORAL EVERY 8 HOURS PRN
Qty: 10 TABLET | Refills: 0 | Status: SHIPPED | OUTPATIENT
Start: 2024-01-29

## 2024-01-29 RX ORDER — ESCITALOPRAM OXALATE 5 MG/1
5 TABLET ORAL DAILY
Status: CANCELLED | OUTPATIENT
Start: 2024-01-29

## 2024-01-29 RX ORDER — AMOXICILLIN 250 MG
1 CAPSULE ORAL 2 TIMES DAILY PRN
Status: CANCELLED | OUTPATIENT
Start: 2024-01-29

## 2024-01-29 RX ORDER — IOPAMIDOL 755 MG/ML
60 INJECTION, SOLUTION INTRAVASCULAR ONCE
Status: COMPLETED | OUTPATIENT
Start: 2024-01-29 | End: 2024-01-29

## 2024-01-29 RX ORDER — ONDANSETRON 4 MG/1
4 TABLET, ORALLY DISINTEGRATING ORAL EVERY 6 HOURS PRN
Status: CANCELLED | OUTPATIENT
Start: 2024-01-29

## 2024-01-29 RX ORDER — NICOTINE POLACRILEX 4 MG
15-30 LOZENGE BUCCAL
Status: DISCONTINUED | OUTPATIENT
Start: 2024-01-29 | End: 2024-01-29 | Stop reason: HOSPADM

## 2024-01-29 RX ORDER — CALCIUM CARBONATE 500 MG/1
1000 TABLET, CHEWABLE ORAL 4 TIMES DAILY PRN
Status: CANCELLED | OUTPATIENT
Start: 2024-01-29

## 2024-01-29 RX ORDER — ONDANSETRON 2 MG/ML
4 INJECTION INTRAMUSCULAR; INTRAVENOUS EVERY 6 HOURS PRN
Status: CANCELLED | OUTPATIENT
Start: 2024-01-29

## 2024-01-29 RX ORDER — IBUPROFEN 600 MG/1
600 TABLET, FILM COATED ORAL ONCE
Status: COMPLETED | OUTPATIENT
Start: 2024-01-29 | End: 2024-01-29

## 2024-01-29 RX ORDER — DEXTROSE MONOHYDRATE 25 G/50ML
25-50 INJECTION, SOLUTION INTRAVENOUS
Status: DISCONTINUED | OUTPATIENT
Start: 2024-01-29 | End: 2024-01-29 | Stop reason: HOSPADM

## 2024-01-29 RX ORDER — AMOXICILLIN 250 MG
2 CAPSULE ORAL 2 TIMES DAILY PRN
Status: CANCELLED | OUTPATIENT
Start: 2024-01-29

## 2024-01-29 RX ORDER — CEFTRIAXONE 2 G/1
2 INJECTION, POWDER, FOR SOLUTION INTRAMUSCULAR; INTRAVENOUS EVERY 24 HOURS
Status: DISCONTINUED | OUTPATIENT
Start: 2024-01-30 | End: 2024-01-29 | Stop reason: HOSPADM

## 2024-01-29 RX ORDER — LIDOCAINE 40 MG/G
CREAM TOPICAL
Status: CANCELLED | OUTPATIENT
Start: 2024-01-29

## 2024-01-29 RX ORDER — OXYCODONE HYDROCHLORIDE 5 MG/1
5 TABLET ORAL EVERY 4 HOURS PRN
Status: DISCONTINUED | OUTPATIENT
Start: 2024-01-29 | End: 2024-01-29 | Stop reason: HOSPADM

## 2024-01-29 RX ORDER — CEFTRIAXONE 2 G/1
2 INJECTION, POWDER, FOR SOLUTION INTRAMUSCULAR; INTRAVENOUS ONCE
Status: COMPLETED | OUTPATIENT
Start: 2024-01-29 | End: 2024-01-29

## 2024-01-29 RX ORDER — ACETAMINOPHEN 325 MG/1
650 TABLET ORAL EVERY 4 HOURS PRN
Status: CANCELLED | OUTPATIENT
Start: 2024-01-29

## 2024-01-29 RX ORDER — ONDANSETRON 2 MG/ML
4 INJECTION INTRAMUSCULAR; INTRAVENOUS EVERY 30 MIN PRN
Status: DISCONTINUED | OUTPATIENT
Start: 2024-01-29 | End: 2024-01-29 | Stop reason: HOSPADM

## 2024-01-29 RX ORDER — HYDROMORPHONE HYDROCHLORIDE 1 MG/ML
0.5 INJECTION, SOLUTION INTRAMUSCULAR; INTRAVENOUS; SUBCUTANEOUS EVERY 30 MIN PRN
Status: DISCONTINUED | OUTPATIENT
Start: 2024-01-29 | End: 2024-01-29 | Stop reason: HOSPADM

## 2024-01-29 RX ADMIN — SODIUM CHLORIDE 1000 ML: 9 INJECTION, SOLUTION INTRAVENOUS at 07:53

## 2024-01-29 RX ADMIN — ONDANSETRON 4 MG: 2 INJECTION INTRAMUSCULAR; INTRAVENOUS at 08:19

## 2024-01-29 RX ADMIN — IBUPROFEN 600 MG: 600 TABLET ORAL at 08:19

## 2024-01-29 RX ADMIN — IOPAMIDOL 60 ML: 755 INJECTION, SOLUTION INTRAVENOUS at 08:49

## 2024-01-29 RX ADMIN — SODIUM CHLORIDE 1000 ML: 9 INJECTION, SOLUTION INTRAVENOUS at 09:06

## 2024-01-29 RX ADMIN — SODIUM CHLORIDE 60 ML: 9 INJECTION, SOLUTION INTRAVENOUS at 08:50

## 2024-01-29 RX ADMIN — CEFTRIAXONE SODIUM 2 G: 2 INJECTION, POWDER, FOR SOLUTION INTRAMUSCULAR; INTRAVENOUS at 09:06

## 2024-01-29 RX ADMIN — HYDROMORPHONE HYDROCHLORIDE 0.5 MG: 1 INJECTION, SOLUTION INTRAMUSCULAR; INTRAVENOUS; SUBCUTANEOUS at 08:19

## 2024-01-29 ASSESSMENT — ACTIVITIES OF DAILY LIVING (ADL)
ADLS_ACUITY_SCORE: 35
ADLS_ACUITY_SCORE: 35

## 2024-01-29 NOTE — ED NOTES
[facility]  ED Nurse Handoff Report    ED Chief complaint: Fever      ED Diagnosis:   Final diagnoses:   Pyelonephritis, acute   Hyperglycemia       Code Status: Full Code    Allergies: No Known Allergies    Patient Story: Myriam comes to the ER today with a fever. She has a hx of type 1 diabetes. She c/o lower abd pain and her CT shows pyelonephritis. She received 2 liters of NS, IV rocephin, dilaudid and zofran and she is feeling better. She also took po motrin. Her blood sugars have been in the 300's.      Treatments and/or interventions provided: IVF/antibiotics, pain control  Patient's response to treatments and/or interventions: good    To be done/followed up on inpatient unit:  cont to monitor    Does this patient have any cognitive concerns?:  none    Activity level - Baseline/Home:  Independent  Activity Level - Current:   Independent    Patient's Preferred language: English   Needed?: No    Isolation: None  Infection: Not Applicable  Patient tested for COVID 19 prior to admission: YES  Bariatric?: No    Vital Signs:   Vitals:    01/29/24 0819 01/29/24 0830 01/29/24 0845 01/29/24 0900   BP: 127/81 (!) 127/93 124/80 127/83   Pulse: 120 (!) 125 118 116   Resp: 16 16     Temp:       TempSrc:       SpO2: 100% 99% 100% (P) 100%   Weight:       Height:           Cardiac Rhythm:     Was the PSS-3 completed:   Yes  What interventions are required if any?               Family Comments: none  OBS brochure/video discussed/provided to patient/family: No              Name of person given brochure if not patient: na              Relationship to patient: na    For the majority of the shift this patient's behavior was Green.   Behavioral interventions performed were na.    ED NURSE PHONE NUMBER: [phone]

## 2024-01-29 NOTE — CONSULTS
[facility]    Consults - Hospitalist Service       Date of Admission:  1/29/2024    Assessment & Plan      Myriam Wylie is a 23 year old female with past medical history significant for DM Type I, DKA, C.difficile and depression who was admitted on 1/29/2024 for further evaluation of nausea, vomiting and abdominal pain.     Patient presents to the ED hemodynamically stable, although tachycardic with a HR of 116. She is afebrile with a temperature of 101.5. She reports three days of progressively worsening abdominal pain and one day of persistent nausea, vomiting and fever. She estimates she's vomited approximately x10. Associated symptoms include generalized weakness and muscle pains, but she otherwise denies chest pain, shortness of breath, altered mental status, confusion, lightheadedness, pre-syncope, urinary frequency, dysuria, hematuria, abnormal odor to urine or bowel changes. She denies diarrhea, melena or RBPR. She last was able to tolerate PO two days ago. She states her blood sugars became elevated in the 300s yesterday, but have otherwise been under control. She endorses smoking marijuana two days ago. No history of hyperemesis cannabinoid syndrome. No recent alcohol or other drug use. Labs notable for a leukocytosis 17.4, glucose 311, alk phos 172, ketone quantitative 0.60 and an initial lactate of 2.3. Repeat lactate 0.9. Lipase 11. UA demonstrated moderate leukocyte esterase, WBC 46, small blood and glucose >= 1000. Negative COVID, influenza and RSV. CT abdomen and pelvis demonstrated bilateral pyelonephritis without complication. Patient was started on ceftriaxone and given 2L NS in the ED. Patient also given zofran and dilaudid with relief of her symptoms.       Addendum: Plan was to admit the patient to the hospital, however, despite our efforts the patient left against medical advice (AMA). She was discharged with a course of oral antibiotics (Augmentin BID x 14 days)  and zofran. Close glucose management given her elevated blood sugars was also discussed. She has a normal mental status and full decisional capacity. The patient understands her condition and the risks of leaving AMA, including but not limited to worsening infection, permanent disability, coma or death. The patient's significant other was also aware of the condition and the patient's desire to leave AMA. The patient has been informed that she may return for care anytime, and has been referred to her primary medical provider for follow-up ASAP.     Sepsis suspect secondary to bilateral pyelonephritis    Leukocytosis   Elevated Lactate   Tachycardia   Nausea and vomiting   Fever  *Sepsis criteria met due to temperature 101.5,  and leukocytosis of 17.4.   *CT abdomen and pelvis demonstrated bilateral pyelonephritis, no complication demonstrated. UA 01/29/2024 demonstrated positive leukocyte esterase, WBC 46 and blood. Glucose >= 1,000. Leukocytosis of 17.4 upon arrival with an elevated lactate of 2.3. Repeat lactate 0.9.   *Started on ceftriaxone in the ED and given 2L NS.   - Continue IV ceftriaxone. Per above, given patient's decision to leave AMA an oral antibiotic was prescribed. Start Augmentin BID x 14 days.   - Start maintenance  mL/hr.   - Telemetry.   - Zofran as needed for nausea and vomiting.   - Pain control with oxycodone and tylenol PRN.   - Follow blood and urine cultures.   - Trend fever, WBC and lactate.      DM I, uncontrolled   Hyperglycemia   Hx of DKA  *Patient seen in the ED with 3 days of N/V and hyperglycemia. Glucose 311 upon arrival. . Lactate 2.3. VBGs obtained and pH 7.37 with pCO2 37. Bicarb 21. Current regimen includes: Basaglar 15 unit daily and Novolog 1 unit per 10 grams CHO; correction 1 unit per 50 over 150 mg/dL.   *Follows with Endocrine at . Last A1c 10/2023 11.0.   - Repeat A1c pending.   - Resume home basaglar.   - Start medium insulin sliding scale  ordered.  - Glucose checks QID.    - Hypoglycemic protocol in place.       Depression   - Continue PTA 5 mg Lexapro.         Diet: Moderate Consistent Carb (60 g CHO per Meal) Diet  DVT Prophylaxis: Pneumatic Compression Devices  Martines Catheter: Not present  Lines: None     Cardiac Monitoring: None  Code Status: Full Code     Disposition Plan      Expected Discharge Date: 01/31/2024                The patient's care was discussed with the Attending Physician, Dr. Salud Garcia .    Annalisa Houston PA-C  Hospitalist Service  [facility]  Securely message with [employer] (more info)  Text page via [facility] Paging/Directory     ______________________________________________________________________    Chief Complaint   Abdominal pain, nausea and vomiting    History is obtained from the patient    History of Present Illness   Myriam Wylie is a 23 year old female with past medical history significant for DM Type I, DKA, C.difficile and depression who was admitted on 1/29/2024 for further evaluation of nausea, vomiting and abdominal pain.     Patient presents to the ED hemodynamically stable, although tachycardic with a HR of 116. She is afebrile with a temperature of 101.5. She reports three days of progressively worsening abdominal pain and one day of persistent nausea, vomiting and fever. She estimates she's vomited approximately x10. Associated symptoms include generalized weakness and muscle pains, but she otherwise denies chest pain, shortness of breath, urinary frequency, dysuria, hematuria, abnormal odor to urine or bowel changes. She denies diarrhea, melena or RBPR. She last was able to tolerate PO two days ago. She endorses smoking marijuana two days ago, as well. No recent alcohol use. She states she just started her menses yesterday. Labs notable for a leukocytosis 17.4, glucose 311, Alk phos 172, ketone quantitative 0.60 and initial lactate of 2.3. Repeat lactate 0.9. Lipase  11. UA demonstrated moderate leukocyte esterase, WBC 46, small blood and glucose >= 1000. Negative COVID, influenza and RSV. CT abdomen and pelvis demonstrated bilateral pyelonephritis without complication. Patient was started on ceftriaxone and given 1 L NS in the ED. Patient also given zofran and dilaudid with relief of her symptoms.     Past Medical History    Past Medical History:   Diagnosis Date    C. difficile colitis     Diabetes type 1, uncontrolled     DKA (diabetic ketoacidosis) (H)     PID (acute pelvic inflammatory disease)      Past Surgical History   Past Surgical History:   Procedure Laterality Date    COLONOSCOPY N/A 2019    Procedure: COLONOSCOPY;  Surgeon: Jeremi Banks MD;  Location: [facility]     ESOPHAGOSCOPY, GASTROSCOPY, DUODENOSCOPY (EGD), COMBINED N/A 2019    Procedure: Upper endoscopy and colonoscopy with biopsy;  Surgeon: Jeremi Banks MD;  Location: [facility]      Prior to Admission Medications   Prior to Admission Medications   Prescriptions Last Dose Informant Patient Reported? Taking?   Ashwagandha 500 MG CAPS 2024 Self Yes Yes   Sig: Take 500 mg by mouth daily   Fenugreek 500 MG CAPS 2024 Self Yes Yes   Sig: Take 500 mg by mouth daily   Glucagon 0.5 MG/0.1ML SOSY Unknown at prn Self No Yes   Sig: Inject 1 ampule Subcutaneous daily as needed   Debo 500 MG CAPS 2024 Self Yes Yes   Sig: Take 500 mg by mouth daily   Probiotic Product (ACIDOPHILUS) CHEW 2024 Self Yes Yes   Sig: Take 1 tablet by mouth daily   acetaminophen (TYLENOL) 325 MG tablet Unknown at prn Self Yes Yes   Sig: Take 325-650 mg by mouth every 6 hours as needed for mild pain   acetone, Urine, test STRP   No No   Si strip by In Vitro route as needed   blood glucose monitoring (ACCU-CHEK FASTCLIX) lancets   No No   Sig: Use to test blood sugar 6 times daily or as directed.   blood glucose monitoring (ACCU-CHEK HENRI SMARTVIEW) meter device kit   No No   Sig: Use to test blood  sugar 6 times daily or as directed.   blood glucose monitoring (ACCU-CHEK SMARTVIEW) test strip   No No   Sig: Use to test blood sugar 6 times daily or as directed.   escitalopram (LEXAPRO) 5 MG tablet 1/28/2024 Self Yes Yes   Sig: Take 5 mg by mouth daily   hypromellose (ARTIFICIAL TEARS) 0.5 % SOLN ophthalmic solution Unknown at prn Self Yes Yes   Sig: Place 1 drop into both eyes every hour as needed for dry eyes   ibuprofen (ADVIL/MOTRIN) 200 MG tablet Unknown at prn Self Yes Yes   Sig: Take 200 mg by mouth every 4 hours as needed for pain   insulin aspart (NOVOLOG PEN) 100 UNIT/ML pen 1/28/2024 Self No Yes   Sig: Inject 1-5 Units Subcutaneous 4 times daily (with meals and nightly)   Patient taking differently: Inject 1-10 Units Subcutaneous 4 times daily (with meals and nightly) ISF: 1 unit per 50 > 150, For -199 give 1 unit. For -249 give 2 units. For -299 give 3 units. For -349 give 4 units. For BG = or > 350 give 5 units.    1 unit for 7 CHO for breakfast, 1 unit for 10 CHO for lunch/dinner   insulin glargine (BASAGLAR KWIKPEN) 100 UNIT/ML pen 1/28/2024 Self No Yes   Sig: Inject 14 Units Subcutaneous daily At noon   Patient taking differently: Inject 14 Units Subcutaneous every 24 hours At noon   insulin pen needle (BD HENRI U/F) 32G X 4 MM   No No   Sig: Use 6 pen needles daily or as directed.      Facility-Administered Medications: None      Allergies   No Known Allergies     Physical Exam   Vital Signs: Temp: (!) 101.5  F (38.6  C) Temp src: Oral BP: 111/84 Pulse: 111   Resp: 16 SpO2: 100 % O2 Device: None (Room air)    Weight: 120 lbs 0 oz    GENERAL:  Pleasant, cooperative, alert. Sitting in bed comfortably upon examination with significant other at bedside.   HEENT: Normocephalic, atraumatic.  Extra occular mm intact.  Sclera clear. PERRL.  Mucous membranes moist.  No erythema; uvula midline.  Neck supple with no adenopathy.   PULMONOLOGY: Clear to auscultation bilaterally with  good exchange at the bases and no wheeze or crackle.  CARDIAC: Tachycardic with regular rhythm.  No appreciated murmur.  ABDOMEN: Soft, nontender non distended.   MUSCULOSKELETAL:  Moving x 4 spontaneously with CMS intact x4.  Normal bulk and tone.  No LE edema.  Radial pulses 2+ bilaterally.    NEURO: Alert and oriented x3. Nonfocal exam.    Medical Decision Making       75 MINUTES SPENT BY ME on the date of service doing chart review, history, exam, documentation & further activities per the note.      Data     I have personally reviewed the following data over the past 24 hrs:    17.4 (H)  \   11.6 (L)   / 258     135 99 8.0 /  311 (H)   4.1 22 0.78 \     ALT: 8 AST: 14 AP: 172 (H) TBILI: 0.5   ALB: 3.9 TOT PROTEIN: 7.8 LIPASE: 11 (L)     Procal: N/A CRP: N/A Lactic Acid: 0.9         Imaging results reviewed over the past 24 hrs:   Recent Results (from the past 24 hour(s))   CT Abdomen Pelvis w Contrast    Narrative    CT ABDOMEN PELVIS WITH CONTRAST 1/29/2024 9:01 AM    CLINICAL HISTORY: Generalized abdominal pain, urinary symptoms, fever,  dka, no prior surgery, await hcg.    TECHNIQUE: CT scan of the abdomen and pelvis was performed following  injection of IV contrast. Multiplanar reformats were obtained. Dose  reduction techniques were used.  CONTRAST: 60 mL Isovue-370    COMPARISON: February 18, 2023    FINDINGS:   LOWER CHEST: No infiltrates or effusions.    HEPATOBILIARY: No significant mass or bile duct dilatation. No  calcified gallstones.     PANCREAS: No significant mass, duct dilatation, or inflammatory  change.    SPLEEN: Normal size.    ADRENAL GLANDS: No significant nodules.    KIDNEYS/BLADDER: Areas of hypoenhancement in both kidneys with mild  urothelial thickening and enhancement concerning for bilateral  pyelonephritis. No perinephric abscess. No hydronephrosis. No stone  disease.    BOWEL: No obstruction or inflammatory change.    PELVIC ORGANS: No pelvic masses.    ADDITIONAL FINDINGS: Trace  pelvic free fluid which is nonspecific but  likely physiologic.    MUSCULOSKELETAL: No frankly destructive bony lesions.      Impression    IMPRESSION: Bilateral pyelonephritis, no complication demonstrated.     MAHNAZ LA MD         SYSTEM ID:  [id number]

## 2024-01-29 NOTE — PHARMACY-ADMISSION MEDICATION HISTORY
Pharmacist Admission Medication History    Admission medication history is complete. The information provided in this note is only as accurate as the sources available at the time of the update.    Information Source(s): Patient and CareEverywhere/SureScripts via in-person    Pertinent Information: none    Changes made to PTA medication list:  Added: None  Deleted: zofran  Changed: lantus to 14 units    Medication Affordability:       Allergies reviewed with patient and updates made in EHR: no    Medication History Completed By: Carlos Wiggins [facility] 1/29/2024 9:50 AM    PTA Med List   Medication Sig Last Dose    acetaminophen (TYLENOL) 325 MG tablet Take 325-650 mg by mouth every 6 hours as needed for mild pain Unknown at prn    Ashwagandha 500 MG CAPS Take 500 mg by mouth daily 1/28/2024    escitalopram (LEXAPRO) 5 MG tablet Take 5 mg by mouth daily 1/28/2024    Fenugreek 500 MG CAPS Take 500 mg by mouth daily 1/28/2024    Glucagon 0.5 MG/0.1ML SOSY Inject 1 ampule Subcutaneous daily as needed Unknown at prn    hypromellose (ARTIFICIAL TEARS) 0.5 % SOLN ophthalmic solution Place 1 drop into both eyes every hour as needed for dry eyes Unknown at prn    ibuprofen (ADVIL/MOTRIN) 200 MG tablet Take 200 mg by mouth every 4 hours as needed for pain Unknown at prn    insulin aspart (NOVOLOG PEN) 100 UNIT/ML pen Inject 1-5 Units Subcutaneous 4 times daily (with meals and nightly) (Patient taking differently: Inject 1-10 Units Subcutaneous 4 times daily (with meals and nightly) ISF: 1 unit per 50 > 150, For -199 give 1 unit. For -249 give 2 units. For -299 give 3 units. For -349 give 4 units. For BG = or > 350 give 5 units.    1 unit for 7 CHO for breakfast, 1 unit for 10 CHO for lunch/dinner) 1/28/2024    insulin glargine (BASAGLAR KWIKPEN) 100 UNIT/ML pen Inject 14 Units Subcutaneous daily At noon (Patient taking differently: Inject 14 Units Subcutaneous every 24 hours At noon) 1/28/2024    Debo Steffen  MG CAPS Take 500 mg by mouth daily 1/28/2024    Probiotic Product (ACIDOPHILUS) CHEW Take 1 tablet by mouth daily 1/28/2024

## 2024-01-29 NOTE — PROGRESS NOTES
RECEIVING UNIT ED HANDOFF REVIEW    ED Nurse Handoff Report was reviewed by: Xin Leyva RN on January 29, 2024 at 11:51 AM
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
## 2024-01-30 ENCOUNTER — PATIENT OUTREACH (OUTPATIENT)
Dept: CARE COORDINATION | Facility: CLINIC | Age: 24
End: 2024-01-30
Payer: COMMERCIAL

## 2024-01-30 LAB — BACTERIA UR CULT: ABNORMAL

## 2024-01-30 NOTE — PROGRESS NOTES
[facility]    Background: Transitional Care Management program identified per system criteria and reviewed by [facility] team for possible outreach.    Assessment: Upon chart review, [facility] Team member will not proceed with patient outreach related to this episode of Transitional Care Management program due to reason below:    Patient declined to answer all post hospital discharge questions with [facility] team member and disconnected call.    Plan: Transitional Care Management episode addressed appropriately per reason noted above.      JR Parsons  [facility]    *Connected Care Resource Team does NOT follow patient ongoing. Referrals are identified based on internal discharge reports and the outreach is to ensure patient has an understanding of their discharge instructions.

## 2024-01-31 NOTE — RESULT ENCOUNTER NOTE
Final Urine Culture Report on 1/30/24  [facility] Emergency Dept discharge antibiotic prescribed: Amoxicillin-Clavulanate (Augmentin) 875-125 mg PO tablet, 1 tablet by mouth 2 times daily for 14 days  #1. Bacteria, >100,000 CFU/ML Escherichia coli is SUSCEPTIBLE to Antibiotic.    No change in treatment per [facility] ED lab result Urine Culture protocol.

## 2024-02-03 LAB — BACTERIA BLD CULT: NO GROWTH

## 2024-06-29 ENCOUNTER — HEALTH MAINTENANCE LETTER (OUTPATIENT)
Age: 24
End: 2024-06-29

## 2024-09-07 ENCOUNTER — HEALTH MAINTENANCE LETTER (OUTPATIENT)
Age: 24
End: 2024-09-07

## 2024-11-16 ENCOUNTER — HEALTH MAINTENANCE LETTER (OUTPATIENT)
Age: 24
End: 2024-11-16

## 2025-01-13 ENCOUNTER — HOSPITAL ENCOUNTER (INPATIENT)
Facility: CLINIC | Age: 25
LOS: 1 days | Discharge: LEFT BEFORE DISCHARGE | DRG: 639 | End: 2025-01-13
Attending: STUDENT IN AN ORGANIZED HEALTH CARE EDUCATION/TRAINING PROGRAM | Admitting: INTERNAL MEDICINE
Payer: COMMERCIAL

## 2025-01-13 VITALS
HEART RATE: 105 BPM | OXYGEN SATURATION: 99 % | TEMPERATURE: 98 F | BODY MASS INDEX: 22.78 KG/M2 | HEIGHT: 59 IN | RESPIRATION RATE: 16 BRPM | SYSTOLIC BLOOD PRESSURE: 145 MMHG | DIASTOLIC BLOOD PRESSURE: 92 MMHG | WEIGHT: 113 LBS

## 2025-01-13 DIAGNOSIS — E10.10 DIABETIC KETOACIDOSIS WITHOUT COMA ASSOCIATED WITH TYPE 1 DIABETES MELLITUS (H): ICD-10-CM

## 2025-01-13 LAB
ALBUMIN SERPL BCG-MCNC: 4.4 G/DL (ref 3.5–5.2)
ALBUMIN UR-MCNC: NEGATIVE MG/DL
ALP SERPL-CCNC: 160 U/L (ref 40–150)
ALT SERPL W P-5'-P-CCNC: 27 U/L (ref 0–50)
ANION GAP SERPL CALCULATED.3IONS-SCNC: 26 MMOL/L (ref 7–15)
APPEARANCE UR: CLEAR
AST SERPL W P-5'-P-CCNC: 58 U/L (ref 0–45)
B-OH-BUTYR SERPL-SCNC: 6.63 MMOL/L
BASE EXCESS BLDV CALC-SCNC: -7.9 MMOL/L (ref -3–3)
BASOPHILS # BLD AUTO: 0 10E3/UL (ref 0–0.2)
BASOPHILS NFR BLD AUTO: 0 %
BILIRUB DIRECT SERPL-MCNC: <0.2 MG/DL (ref 0–0.3)
BILIRUB SERPL-MCNC: 0.6 MG/DL
BILIRUB UR QL STRIP: NEGATIVE
BUN SERPL-MCNC: 17.3 MG/DL (ref 6–20)
CALCIUM SERPL-MCNC: 9.5 MG/DL (ref 8.8–10.4)
CHLORIDE SERPL-SCNC: 93 MMOL/L (ref 98–107)
COLOR UR AUTO: ABNORMAL
CREAT SERPL-MCNC: 0.84 MG/DL (ref 0.51–0.95)
EGFRCR SERPLBLD CKD-EPI 2021: >90 ML/MIN/1.73M2
EOSINOPHIL # BLD AUTO: 0 10E3/UL (ref 0–0.7)
EOSINOPHIL NFR BLD AUTO: 0 %
ERYTHROCYTE [DISTWIDTH] IN BLOOD BY AUTOMATED COUNT: 13.5 % (ref 10–15)
EST. AVERAGE GLUCOSE BLD GHB EST-MCNC: 332 MG/DL
GLUCOSE BLDC GLUCOMTR-MCNC: 191 MG/DL (ref 70–99)
GLUCOSE BLDC GLUCOMTR-MCNC: 238 MG/DL (ref 70–99)
GLUCOSE BLDC GLUCOMTR-MCNC: 342 MG/DL (ref 70–99)
GLUCOSE BLDC GLUCOMTR-MCNC: 561 MG/DL (ref 70–99)
GLUCOSE SERPL-MCNC: 601 MG/DL (ref 70–99)
GLUCOSE UR STRIP-MCNC: >=1000 MG/DL
HBA1C MFR BLD: 13.2 %
HCG SERPL QL: NEGATIVE
HCO3 BLDV-SCNC: 19 MMOL/L (ref 21–28)
HCO3 SERPL-SCNC: 17 MMOL/L (ref 22–29)
HCT VFR BLD AUTO: 41 % (ref 35–47)
HGB BLD-MCNC: 13.3 G/DL (ref 11.7–15.7)
HGB UR QL STRIP: NEGATIVE
IMM GRANULOCYTES # BLD: 0 10E3/UL
IMM GRANULOCYTES NFR BLD: 0 %
KETONES UR STRIP-MCNC: 100 MG/DL
LEUKOCYTE ESTERASE UR QL STRIP: NEGATIVE
LYMPHOCYTES # BLD AUTO: 2.5 10E3/UL (ref 0.8–5.3)
LYMPHOCYTES NFR BLD AUTO: 36 %
MAGNESIUM SERPL-MCNC: 1.8 MG/DL (ref 1.7–2.3)
MCH RBC QN AUTO: 29.4 PG (ref 26.5–33)
MCHC RBC AUTO-ENTMCNC: 32.4 G/DL (ref 31.5–36.5)
MCV RBC AUTO: 91 FL (ref 78–100)
MONOCYTES # BLD AUTO: 0.4 10E3/UL (ref 0–1.3)
MONOCYTES NFR BLD AUTO: 5 %
NEUTROPHILS # BLD AUTO: 3.9 10E3/UL (ref 1.6–8.3)
NEUTROPHILS NFR BLD AUTO: 57 %
NITRATE UR QL: NEGATIVE
NRBC # BLD AUTO: 0 10E3/UL
NRBC BLD AUTO-RTO: 0 /100
O2/TOTAL GAS SETTING VFR VENT: 0 %
OXYHGB MFR BLDV: 39 % (ref 70–75)
PCO2 BLDV: 44 MM HG (ref 40–50)
PH BLDV: 7.25 [PH] (ref 7.32–7.43)
PH UR STRIP: 5.5 [PH] (ref 5–7)
PHOSPHATE SERPL-MCNC: 4.5 MG/DL (ref 2.5–4.5)
PLATELET # BLD AUTO: 183 10E3/UL (ref 150–450)
PO2 BLDV: 28 MM HG (ref 25–47)
POTASSIUM SERPL-SCNC: 4.4 MMOL/L (ref 3.4–5.3)
PROT SERPL-MCNC: 8.2 G/DL (ref 6.4–8.3)
RBC # BLD AUTO: 4.53 10E6/UL (ref 3.8–5.2)
RBC URINE: 0 /HPF
SAO2 % BLDV: 39.5 % (ref 70–75)
SODIUM SERPL-SCNC: 136 MMOL/L (ref 135–145)
SP GR UR STRIP: 1.03 (ref 1–1.03)
SQUAMOUS EPITHELIAL: <1 /HPF
UROBILINOGEN UR STRIP-MCNC: NORMAL MG/DL
WBC # BLD AUTO: 6.8 10E3/UL (ref 4–11)
WBC URINE: <1 /HPF

## 2025-01-13 PROCEDURE — 82805 BLOOD GASES W/O2 SATURATION: CPT | Performed by: STUDENT IN AN ORGANIZED HEALTH CARE EDUCATION/TRAINING PROGRAM

## 2025-01-13 PROCEDURE — 258N000002 HC RX IP 258 OP 250: Performed by: STUDENT IN AN ORGANIZED HEALTH CARE EDUCATION/TRAINING PROGRAM

## 2025-01-13 PROCEDURE — 258N000003 HC RX IP 258 OP 636: Performed by: STUDENT IN AN ORGANIZED HEALTH CARE EDUCATION/TRAINING PROGRAM

## 2025-01-13 PROCEDURE — 36415 COLL VENOUS BLD VENIPUNCTURE: CPT | Performed by: STUDENT IN AN ORGANIZED HEALTH CARE EDUCATION/TRAINING PROGRAM

## 2025-01-13 PROCEDURE — 82962 GLUCOSE BLOOD TEST: CPT

## 2025-01-13 PROCEDURE — 84075 ASSAY ALKALINE PHOSPHATASE: CPT | Performed by: INTERNAL MEDICINE

## 2025-01-13 PROCEDURE — 99285 EMERGENCY DEPT VISIT HI MDM: CPT

## 2025-01-13 PROCEDURE — 83036 HEMOGLOBIN GLYCOSYLATED A1C: CPT | Performed by: STUDENT IN AN ORGANIZED HEALTH CARE EDUCATION/TRAINING PROGRAM

## 2025-01-13 PROCEDURE — 84100 ASSAY OF PHOSPHORUS: CPT | Performed by: STUDENT IN AN ORGANIZED HEALTH CARE EDUCATION/TRAINING PROGRAM

## 2025-01-13 PROCEDURE — 250N000011 HC RX IP 250 OP 636: Performed by: STUDENT IN AN ORGANIZED HEALTH CARE EDUCATION/TRAINING PROGRAM

## 2025-01-13 PROCEDURE — 84703 CHORIONIC GONADOTROPIN ASSAY: CPT | Performed by: STUDENT IN AN ORGANIZED HEALTH CARE EDUCATION/TRAINING PROGRAM

## 2025-01-13 PROCEDURE — 84450 TRANSFERASE (AST) (SGOT): CPT | Performed by: INTERNAL MEDICINE

## 2025-01-13 PROCEDURE — 85004 AUTOMATED DIFF WBC COUNT: CPT | Performed by: STUDENT IN AN ORGANIZED HEALTH CARE EDUCATION/TRAINING PROGRAM

## 2025-01-13 PROCEDURE — 99223 1ST HOSP IP/OBS HIGH 75: CPT | Performed by: INTERNAL MEDICINE

## 2025-01-13 PROCEDURE — 82010 KETONE BODYS QUAN: CPT | Performed by: STUDENT IN AN ORGANIZED HEALTH CARE EDUCATION/TRAINING PROGRAM

## 2025-01-13 PROCEDURE — 96360 HYDRATION IV INFUSION INIT: CPT

## 2025-01-13 PROCEDURE — 81001 URINALYSIS AUTO W/SCOPE: CPT | Performed by: STUDENT IN AN ORGANIZED HEALTH CARE EDUCATION/TRAINING PROGRAM

## 2025-01-13 PROCEDURE — 85014 HEMATOCRIT: CPT | Performed by: STUDENT IN AN ORGANIZED HEALTH CARE EDUCATION/TRAINING PROGRAM

## 2025-01-13 PROCEDURE — 80069 RENAL FUNCTION PANEL: CPT | Performed by: STUDENT IN AN ORGANIZED HEALTH CARE EDUCATION/TRAINING PROGRAM

## 2025-01-13 PROCEDURE — 120N000013 HC R&B IMCU

## 2025-01-13 PROCEDURE — 84155 ASSAY OF PROTEIN SERUM: CPT | Performed by: INTERNAL MEDICINE

## 2025-01-13 PROCEDURE — 80053 COMPREHEN METABOLIC PANEL: CPT | Performed by: STUDENT IN AN ORGANIZED HEALTH CARE EDUCATION/TRAINING PROGRAM

## 2025-01-13 PROCEDURE — 250N000009 HC RX 250: Performed by: STUDENT IN AN ORGANIZED HEALTH CARE EDUCATION/TRAINING PROGRAM

## 2025-01-13 PROCEDURE — 80048 BASIC METABOLIC PNL TOTAL CA: CPT | Performed by: STUDENT IN AN ORGANIZED HEALTH CARE EDUCATION/TRAINING PROGRAM

## 2025-01-13 PROCEDURE — 83735 ASSAY OF MAGNESIUM: CPT | Performed by: STUDENT IN AN ORGANIZED HEALTH CARE EDUCATION/TRAINING PROGRAM

## 2025-01-13 RX ORDER — AMOXICILLIN 250 MG
1 CAPSULE ORAL 2 TIMES DAILY PRN
Status: DISCONTINUED | OUTPATIENT
Start: 2025-01-13 | End: 2025-01-13 | Stop reason: HOSPADM

## 2025-01-13 RX ORDER — ONDANSETRON 2 MG/ML
4 INJECTION INTRAMUSCULAR; INTRAVENOUS EVERY 6 HOURS PRN
Status: DISCONTINUED | OUTPATIENT
Start: 2025-01-13 | End: 2025-01-13 | Stop reason: HOSPADM

## 2025-01-13 RX ORDER — ONDANSETRON 4 MG/1
4 TABLET, ORALLY DISINTEGRATING ORAL EVERY 6 HOURS PRN
Status: DISCONTINUED | OUTPATIENT
Start: 2025-01-13 | End: 2025-01-13 | Stop reason: HOSPADM

## 2025-01-13 RX ORDER — DEXTROSE MONOHYDRATE AND SODIUM CHLORIDE 5; .45 G/100ML; G/100ML
1000 INJECTION, SOLUTION INTRAVENOUS CONTINUOUS PRN
Status: DISCONTINUED | OUTPATIENT
Start: 2025-01-13 | End: 2025-01-13

## 2025-01-13 RX ORDER — DEXTROSE MONOHYDRATE 25 G/50ML
25-50 INJECTION, SOLUTION INTRAVENOUS
Status: DISCONTINUED | OUTPATIENT
Start: 2025-01-13 | End: 2025-01-13

## 2025-01-13 RX ORDER — DEXTROSE MONOHYDRATE 25 G/50ML
25-50 INJECTION, SOLUTION INTRAVENOUS
Status: DISCONTINUED | OUTPATIENT
Start: 2025-01-13 | End: 2025-01-13 | Stop reason: HOSPADM

## 2025-01-13 RX ORDER — CALCIUM CARBONATE 500 MG/1
1000 TABLET, CHEWABLE ORAL 4 TIMES DAILY PRN
Status: DISCONTINUED | OUTPATIENT
Start: 2025-01-13 | End: 2025-01-13 | Stop reason: HOSPADM

## 2025-01-13 RX ORDER — DEXTROSE MONOHYDRATE, SODIUM CHLORIDE, AND POTASSIUM CHLORIDE 50; 1.49; 4.5 G/1000ML; G/1000ML; G/1000ML
INJECTION, SOLUTION INTRAVENOUS CONTINUOUS
Status: DISCONTINUED | OUTPATIENT
Start: 2025-01-13 | End: 2025-01-13

## 2025-01-13 RX ORDER — LIDOCAINE 40 MG/G
CREAM TOPICAL
Status: DISCONTINUED | OUTPATIENT
Start: 2025-01-13 | End: 2025-01-13 | Stop reason: HOSPADM

## 2025-01-13 RX ORDER — SODIUM CHLORIDE AND POTASSIUM CHLORIDE 150; 450 MG/100ML; MG/100ML
INJECTION, SOLUTION INTRAVENOUS CONTINUOUS
Status: DISCONTINUED | OUTPATIENT
Start: 2025-01-13 | End: 2025-01-13 | Stop reason: HOSPADM

## 2025-01-13 RX ORDER — NICOTINE POLACRILEX 4 MG
15-30 LOZENGE BUCCAL
Status: DISCONTINUED | OUTPATIENT
Start: 2025-01-13 | End: 2025-01-13 | Stop reason: HOSPADM

## 2025-01-13 RX ORDER — AMOXICILLIN 250 MG
2 CAPSULE ORAL 2 TIMES DAILY PRN
Status: DISCONTINUED | OUTPATIENT
Start: 2025-01-13 | End: 2025-01-13 | Stop reason: HOSPADM

## 2025-01-13 RX ORDER — SODIUM CHLORIDE 450 MG/100ML
1000 INJECTION, SOLUTION INTRAVENOUS CONTINUOUS
Status: DISCONTINUED | OUTPATIENT
Start: 2025-01-13 | End: 2025-01-13

## 2025-01-13 RX ORDER — ESCITALOPRAM OXALATE 5 MG/1
5 TABLET ORAL DAILY
Status: DISCONTINUED | OUTPATIENT
Start: 2025-01-13 | End: 2025-01-13 | Stop reason: HOSPADM

## 2025-01-13 RX ORDER — DEXTROSE MONOHYDRATE AND SODIUM CHLORIDE 5; .45 G/100ML; G/100ML
INJECTION, SOLUTION INTRAVENOUS CONTINUOUS
Status: DISCONTINUED | OUTPATIENT
Start: 2025-01-13 | End: 2025-01-13 | Stop reason: HOSPADM

## 2025-01-13 RX ORDER — POTASSIUM CHLORIDE 7.45 MG/ML
10 INJECTION INTRAVENOUS ONCE
Status: COMPLETED | OUTPATIENT
Start: 2025-01-13 | End: 2025-01-13

## 2025-01-13 RX ORDER — ACETAMINOPHEN 325 MG/1
325-650 TABLET ORAL EVERY 6 HOURS PRN
Status: DISCONTINUED | OUTPATIENT
Start: 2025-01-13 | End: 2025-01-13 | Stop reason: HOSPADM

## 2025-01-13 RX ORDER — NICOTINE POLACRILEX 4 MG
15-30 LOZENGE BUCCAL
Status: DISCONTINUED | OUTPATIENT
Start: 2025-01-13 | End: 2025-01-13

## 2025-01-13 RX ADMIN — INSULIN HUMAN 4.5 UNITS/HR: 1 INJECTION, SOLUTION INTRAVENOUS at 16:06

## 2025-01-13 RX ADMIN — SODIUM CHLORIDE 1000 ML: 4.5 INJECTION, SOLUTION INTRAVENOUS at 16:06

## 2025-01-13 RX ADMIN — SODIUM CHLORIDE 1000 ML: 9 INJECTION, SOLUTION INTRAVENOUS at 13:35

## 2025-01-13 RX ADMIN — POTASSIUM CHLORIDE 10 MEQ: 7.46 INJECTION, SOLUTION INTRAVENOUS at 16:05

## 2025-01-13 RX ADMIN — SODIUM CHLORIDE 1000 ML: 9 INJECTION, SOLUTION INTRAVENOUS at 14:55

## 2025-01-13 ASSESSMENT — ACTIVITIES OF DAILY LIVING (ADL)
ADLS_ACUITY_SCORE: 55

## 2025-01-13 NOTE — H&P
[facility]    History and Physical - Hospitalist Service       Date of Admission:  1/13/2025  ______________________________________________________________    Chief Complaint   Elevated blood glucose    History is obtained from the patient    History of Present Illness   Myriam Wylie is a 24 year old female who has a past medical history of type 1 diabetes mellitus poorly controlled, presented to the ER due to concern for persistent elevation in blood glucose greater than 600 and presence of ketones on her home check.  Per ER report patient believes that she has been in DKA for the past 3 days, and was worried about having symptoms of headache nausea vomiting slight diarrhea, confusion and fatigue along with shortness of breath.  Patient has been in a lot of stress and poor oral intake.  Patient has been following her insulin regimen per her report.  Over the last 1 week reported cold/upper respiratory infection per patient was also concerning.  In the ER upon presentation patient was tachycardic, lab workup showing glucose 601, hemoglobin A1c 13.2, ketone 6.63, sodium 136, potassium 4.4, elevated anion gap at 26.  VBG showing acidosis with pH at 7.25, urinalysis clear except for glucosuria.  Due to concern for DKA patient was started on IV insulin drip along with fluids and was transferred to [facility] for further evaluation management.  Patient received from bedside RN that patient was requesting to leave AMA.  Detailed discussion was held with patient along with family in the room about the significant risk of comorbidity and mortality with untreated DKA if patient were to leave AMA.  Emotional support was provided all questions were answered risks were discussed again in detail along with floor nursing staff.  Discussed about rechecking BMP and ketones every 4 hourly overnight and continuing the IV insulin drip overnight and likely transition to Lantus in a.m. based on her symptoms and  improvement.  Patient stated that she understands the risks she knew all of this information from the get go she feels very well and would like to discharge and does not want to stay in the hospital overnight.  Patient left AMA.      Past Medical History    Past Medical History:   Diagnosis Date    C. difficile colitis     Diabetes type 1, uncontrolled     DKA (diabetic ketoacidosis) (H)     PID (acute pelvic inflammatory disease)        Past Surgical History   Past Surgical History:   Procedure Laterality Date    COLONOSCOPY N/A 2019    Procedure: COLONOSCOPY;  Surgeon: Jeremi Banks MD;  Location: [facility]     ESOPHAGOSCOPY, GASTROSCOPY, DUODENOSCOPY (EGD), COMBINED N/A 2019    Procedure: Upper endoscopy and colonoscopy with biopsy;  Surgeon: Jeremi Banks MD;  Location: [facility]        Prior to Admission Medications   Prior to Admission Medications   Prescriptions Last Dose Informant Patient Reported? Taking?   Ashwagandha 500 MG CAPS 2025 Self Yes Yes   Sig: Take 500 mg by mouth daily   Fenugreek 500 MG CAPS 2025 Self Yes Yes   Sig: Take 500 mg by mouth daily   Glucagon 0.5 MG/0.1ML SOSY  Self No No   Sig: Inject 1 ampule Subcutaneous daily as needed   Debo 500 MG CAPS 2025 Self Yes Yes   Sig: Take 500 mg by mouth daily   Probiotic Product (ACIDOPHILUS) CHEW 2025 Self Yes Yes   Sig: Take 1 tablet by mouth daily   acetaminophen (TYLENOL) 325 MG tablet  Self Yes Yes   Sig: Take 325-650 mg by mouth every 6 hours as needed for mild pain   acetone, Urine, test STRP   No No   Si strip by In Vitro route as needed   blood glucose monitoring (ACCU-CHEK FASTCLIX) lancets   No No   Sig: Use to test blood sugar 6 times daily or as directed.   blood glucose monitoring (ACCU-CHEK HENRI SMARTVIEW) meter device kit   No No   Sig: Use to test blood sugar 6 times daily or as directed.   blood glucose monitoring (ACCU-CHEK SMARTVIEW) test strip   No No   Sig: Use to test blood sugar  6 times daily or as directed.   escitalopram (LEXAPRO) 5 MG tablet 1/12/2025 Self Yes Yes   Sig: Take 5 mg by mouth daily   hypromellose (ARTIFICIAL TEARS) 0.5 % SOLN ophthalmic solution  Self Yes Yes   Sig: Place 1 drop into both eyes every hour as needed for dry eyes   ibuprofen (ADVIL/MOTRIN) 200 MG tablet  Self Yes Yes   Sig: Take 200 mg by mouth every 4 hours as needed for pain   insulin aspart (NOVOLOG PEN) 100 UNIT/ML pen 1/13/2025 at 12:00 PM Self No Yes   Sig: Inject 1-5 Units Subcutaneous 4 times daily (with meals and nightly)   Patient taking differently: Inject 1-10 Units subcutaneously 4 times daily (with meals and nightly). ISF: 1 unit per 50 > 150, For -199 give 1 unit. For -249 give 2 units. For -299 give 3 units. For -349 give 4 units. For BG = or > 350 give 5 units.    1 unit for 10 CHO for breakfast, 1 unit for 7 CHO for lunch/dinner   insulin glargine (BASAGLAR KWIKPEN) 100 UNIT/ML pen 1/12/2025 Noon Self No Yes   Sig: Inject 14 Units Subcutaneous daily At noon   Patient taking differently: Inject 14 Units subcutaneously every 24 hours. At noon   insulin pen needle (BD HENRI U/F) 32G X 4 MM   No No   Sig: Use 6 pen needles daily or as directed.   ondansetron (ZOFRAN ODT) 4 MG ODT tab 1/12/2025 at  4:00 AM  No Yes   Sig: Take 1 tablet (4 mg) by mouth every 8 hours as needed for nausea      Facility-Administered Medications: None        Review of Systems    The 10 point Review of Systems is negative other than noted in the HPI or here.     Social History   I have reviewed this patient's social history and updated it with pertinent information if needed.  Social History     Tobacco Use    Smoking status: Never    Smokeless tobacco: Never   Substance Use Topics    Alcohol use: Yes    Drug use: Yes     Types: Marijuana         Family History     No significant family history      Allergies   No Known Allergies     Physical Exam   Vital Signs: Temp: 98  F (36.7  C) Temp src: Oral  BP: (!) 145/92 Pulse: 105   Resp: 16 SpO2: 99 %      Weight: 113 lbs 0 oz    Constitutional: Awake, alert, cooperative, no apparent distress  Respiratory: Clear to auscultation bilaterally, no crackles or wheezing  Cardiovascular: Regular rate and rhythm, normal S1 and S2, and no murmur noted  GI: Normal bowel sounds, soft, non-distended, non-tender  Skin/Integumen: No rashes, no cyanosis, no edema  Other: Alert oriented x 3, no apparent focal deficits.

## 2025-01-13 NOTE — ED TRIAGE NOTES
"DKA. Patient states she has been in DKA for 3 days.  Emesis. Diarrhea. BMG \"skyrocketed\"         "

## 2025-01-13 NOTE — ED PROVIDER NOTES
Emergency Department Note      History of Present Illness     Chief Complaint   Blood Sugar Problem    HPI   Myriam Wylie is a 24 year old female with history of type 1 diabetes, DKA, and PID who presents to the ED for evaluation of a blood sugar problem. The patient states that she believes she has been in DKA for the past 3 days. She reports shortness of breath, head ache, vomiting, slight diarrhea, delirium, and fatigue which she states is similar to previous episodes of DKA. She reports that she not been eating correctly and been dealing with a lot of stress. She states that she has been using her insulin medication regularly and pushing fluids. Of note, she reports that she had a cold and fever last week and had 2 kidney infections last year. She denies any dysuria or other medical problems    Independent Historian   None    Review of External Notes   February 1, 2024-acute pyelonephritis discharge summary.       Past Medical History     Medical History and Problem List   C. difficile colitis  Diabetes type 1, uncontrolled  DKA (diabetic ketoacidosis) (H)  PID (acute pelvic inflammatory disease)  Acute pyelonephritis   PTSD    Medications   acetone, Urine, test STRP  amoxicillin-clavulanate (AUGMENTIN) 875-125 MG tablet  Ashwagandha 500 MG CAPS  blood glucose monitoring (ACCU-CHEK FASTCLIX) lancets  blood glucose monitoring (ACCU-CHEK HENRI SMARTVIEW) meter device kit  blood glucose monitoring (ACCU-CHEK SMARTVIEW) test strip  escitalopram (LEXAPRO) 5 MG tablet  Fenugreek 500 MG CAPS  Glucagon 0.5 MG/0.1ML SOSY  insulin aspart (NOVOLOG PEN) 100 UNIT/ML pen  insulin glargine (BASAGLAR KWIKPEN) 100 UNIT/ML pen  insulin pen needle (BD HENRI U/F) 32G X 4 MM  ondansetron (ZOFRAN ODT) 4 MG ODT tab    Surgical History   Past Surgical History:   Procedure Laterality Date    COLONOSCOPY N/A 9/18/2019    Procedure: COLONOSCOPY;  Surgeon: Jeremi Banks MD;  Location:  [facility]     ESOPHAGOSCOPY, GASTROSCOPY,  "DUODENOSCOPY (EGD), COMBINED N/A 9/18/2019    Procedure: Upper endoscopy and colonoscopy with biopsy;  Surgeon: Jeremi Banks MD;  Location: [facility]        Physical Exam     Patient Vitals for the past 24 hrs:   BP Temp Temp src Pulse Resp SpO2 Height Weight   01/13/25 1540 -- 98  F (36.7  C) Oral 90 18 100 % 1.499 m (4' 11\") 51.3 kg (113 lb)   01/13/25 1531 121/80 -- -- 93 -- 100 % -- --   01/13/25 1500 131/85 -- -- 90 -- 100 % -- --   01/13/25 1420 -- -- -- -- -- 100 % -- --   01/13/25 1400 97/71 -- -- 103 -- -- -- --   01/13/25 1330 (!) 138/93 -- -- 115 -- -- -- --     Physical Exam  GENERAL: Patient well-appearing  HEAD: Atraumatic.  NECK: No rigidity  CV: RRR, no murmurs, rubs or gallops  PULM: CTAB with good aeration; no retractions, rales, rhonchi, or wheezing  ABD: Soft, mild generalized soreness to palpation, nondistended, no guarding  DERM: No rash. Skin warm and dry  EXTREMITY: Moving all extremities without difficulty. No calf tenderness or peripheral edema       Diagnostics     Lab Results   Labs Ordered and Resulted from Time of ED Arrival to Time of ED Departure   BASIC METABOLIC PANEL - Abnormal       Result Value    Sodium 136      Potassium 4.4      Chloride 93 (*)     Carbon Dioxide (CO2) 17 (*)     Anion Gap 26 (*)     Urea Nitrogen 17.3      Creatinine 0.84      GFR Estimate >90      Calcium 9.5      Glucose 601 (*)    BLOOD GAS VENOUS - Abnormal    pH Venous 7.25 (*)     pCO2 Venous 44      pO2 Venous 28      Bicarbonate Venous 19 (*)     Base Excess/Deficit Venous -7.9 (*)     FIO2 0      Oxyhemoglobin Venous 39 (*)     O2 Sat, Venous 39.5 (*)    KETONE BETA-HYDROXYBUTYRATE QUANTITATIVE, RAPID - Abnormal    Ketone (Beta-Hydroxybutyrate) Quantitative 6.63 (*)    GLUCOSE BY METER - Abnormal    GLUCOSE BY METER POCT 561 (*)    ROUTINE UA WITH MICROSCOPIC REFLEX TO CULTURE - Abnormal    Color Urine Straw      Appearance Urine Clear      Glucose Urine >=1000 (*)     Bilirubin Urine " Negative      Ketones Urine 100 (*)     Specific Gravity Urine 1.029      Blood Urine Negative      pH Urine 5.5      Protein Albumin Urine Negative      Urobilinogen Urine Normal      Nitrite Urine Negative      Leukocyte Esterase Urine Negative      RBC Urine 0      WBC Urine <1      Squamous Epithelials Urine <1     HEMOGLOBIN A1C - Abnormal    Estimated Average Glucose 332 (*)     Hemoglobin A1C 13.2 (*)    GLUCOSE BY METER - Abnormal    GLUCOSE BY METER POCT 342 (*)    HCG QUALITATIVE PREGNANCY - Normal    hCG Serum Qualitative Negative     PHOSPHORUS - Normal    Phosphorus 4.5     MAGNESIUM - Normal    Magnesium 1.8     CBC WITH PLATELETS AND DIFFERENTIAL    WBC Count 6.8      RBC Count 4.53      Hemoglobin 13.3      Hematocrit 41.0      MCV 91      MCH 29.4      MCHC 32.4      RDW 13.5      Platelet Count 183      % Neutrophils 57      % Lymphocytes 36      % Monocytes 5      % Eosinophils 0      % Basophils 0      % Immature Granulocytes 0      NRBCs per 100 WBC 0      Absolute Neutrophils 3.9      Absolute Lymphocytes 2.5      Absolute Monocytes 0.4      Absolute Eosinophils 0.0      Absolute Basophils 0.0      Absolute Immature Granulocytes 0.0      Absolute NRBCs 0.0     GLUCOSE MONITOR NURSING POCT       Imaging   No orders to display        Independent Interpretation   None    ED Course      Medications Administered   Medications   dextrose 5% and 0.45% NaCl infusion (has no administration in time range)   dextrose 50 % injection 25-50 mL (has no administration in time range)   sodium chloride 0.9% BOLUS 1,000 mL (0 mLs Intravenous Stopped 1/13/25 1606)     Followed by   0.45% sodium chloride infusion (1,000 mLs Intravenous $New Bag 1/13/25 1606)   potassium chloride 10 mEq in 100 mL sterile water infusion (10 mEq Intravenous $New Bag 1/13/25 1605)   insulin regular (MYXREDLIN) 1 unit/mL infusion ( Intravenous Canceled Entry 1/13/25 1610)   glucose gel 15-30 g (has no administration in time range)      Or   dextrose 50 % injection 25-50 mL (has no administration in time range)     Or   glucagon injection 1 mg (has no administration in time range)   sodium chloride 0.9% BOLUS 1,000 mL (0 mLs Intravenous Stopped 1/13/25 1426)       Procedures   Procedures     Discussion of Management   I discussed admission and the plan of care with the Hospitalist Dr. Del Valle      ED Course   ED Course as of 01/13/25 1630   Mon Jan 13, 2025   1312 I obtained history and examined the patient as noted above.        Additional Documentation  None    Medical Decision Making / Diagnosis     CMS Diagnoses: None    MIPS       None    MDM   Myriam Wylie is a 24 year old female     Symptoms consistent with DKA.     Chronic condition complicating - diabetes    Bedside BS elevated. Promptly placed in a room and IV access obtained.     DDx considered, but not limited to HHS, sepsis, ischemia.    Labs demonstrated hyperglycemia, acidosis, and ketosis. Potassium wnl.     Not showing evidence of cerebral edema, not altered, and thus CT head not indicated.    Followed DKA order set.    Given IV NS. Repleted potassium. Started IV insulin drip.     Discussed with hospitalist      Patient to be admitted to step-down      Disposition   The patient was discharged.     Diagnosis     ICD-10-CM    1. Diabetic ketoacidosis without coma associated with type 1 diabetes mellitus (H)  E10.10            Discharge Medications   New Prescriptions    No medications on file     Scribe Disclosure:  I, Daniel Huerta, am serving as a scribe at 1:21 PM on 1/13/2025 to document services personally performed by Chepe Shafer MD based on my observations and the provider's statements to me.        Chepe Shafer MD  01/13/25 [id number]

## 2025-01-14 NOTE — DISCHARGE SUMMARY
[facility]  Hospitalist Discharge Summary      Date of Admission:  1/13/2025  Date of Discharge:  1/13/2025  7:26 PM  Discharging Provider: Shauna Del Valle MD  Discharge Service: Hospitalist Service    Discharge Diagnoses   DKA    Patient discharged AMA  Discussed in detail about increased mortality/comorbidity with untreated DKA if patient were to discharge without appropriate management.  Patient stated she understands all the risks that she has been treating her DKA/diabetes mellitus type 1 herself multiple times in the past and she would like to go home.    Hospital Course    Myriam Wylie is a 24 year old female who has a past medical history of type 1 diabetes mellitus poorly controlled, presented to the ER due to concern for persistent elevation in blood glucose greater than 600 and presence of ketones on her home check.  Per ER report patient believes that she has been in DKA for the past 3 days, and was worried about having symptoms of headache nausea vomiting slight diarrhea, confusion and fatigue along with shortness of breath.  Patient has been in a lot of stress and poor oral intake.  Patient has been following her insulin regimen per her report.  Over the last 1 week reported cold/upper respiratory infection per patient was also concerning.  In the ER upon presentation patient was tachycardic, lab workup showing glucose 601, hemoglobin A1c 13.2, ketone 6.63, sodium 136, potassium 4.4, elevated anion gap at 26.  VBG showing acidosis with pH at 7.25, urinalysis clear except for glucosuria.  Due to concern for DKA patient was started on IV insulin drip along with fluids and was transferred to [facility] for further evaluation management.  Patient received from bedside RN that patient was requesting to leave AMA.  Detailed discussion was held with patient along with family in the room about the significant risk of comorbidity and mortality with untreated DKA if patient were to leave  AMA.  Emotional support was provided all questions were answered risks were discussed again in detail along with floor nursing staff.  Discussed about rechecking BMP and ketones every 4 hourly overnight and continuing the IV insulin drip overnight and likely transition to Lantus in a.m. based on her symptoms and improvement.  Patient stated that she understands the risks she knew all of this information from the get go she feels very well and would like to discharge and does not want to stay in the hospital overnight.  Patient left AMA.  Patient declined recheck of BMP/glucose/ketones.  The only lab work available was prior to initiating the IV insulin drip/IV fluid resuscitation upon initial admission.  Unclear status at the time of discharge.  Patient is very high risk for readmission/increased mortality.    Consultations This Hospital Stay   None    Code Status   Full Code    Time Spent on this Encounter   I, Shauna Del Valle MD, personally saw the patient today and spent greater than 30 minutes discharging this patient.       Shauna Del Valle MD  [place] [address] [address] [place] [address]  Phone: [phone]  ______________________________________________________________________    Physical Exam   Vital Signs: Temp: 98  F (36.7  C) Temp src: Oral BP: (!) 145/92 Pulse: 105   Resp: 16 SpO2: 99 %      Weight: 113 lbs 0 oz         Primary Care Physician   Physician No Ref-Primary    Discharge Orders   No discharge procedures on file.    Significant Results and Procedures   Results for orders placed or performed during the hospital encounter of 01/29/24   CT Abdomen Pelvis w Contrast    Narrative    CT ABDOMEN PELVIS WITH CONTRAST 1/29/2024 9:01 AM    CLINICAL HISTORY: Generalized abdominal pain, urinary symptoms, fever,  dka, no prior surgery, await hcg.    TECHNIQUE: CT scan of the abdomen and pelvis was performed following  injection of IV contrast. Multiplanar reformats  were obtained. Dose  reduction techniques were used.  CONTRAST: 60 mL [id number]    COMPARISON: February 18, 2023    FINDINGS:   LOWER CHEST: No infiltrates or effusions.    HEPATOBILIARY: No significant mass or bile duct dilatation. No  calcified gallstones.     PANCREAS: No significant mass, duct dilatation, or inflammatory  change.    SPLEEN: Normal size.    ADRENAL GLANDS: No significant nodules.    KIDNEYS/BLADDER: Areas of hypoenhancement in both kidneys with mild  urothelial thickening and enhancement concerning for bilateral  pyelonephritis. No perinephric abscess. No hydronephrosis. No stone  disease.    BOWEL: No obstruction or inflammatory change.    PELVIC ORGANS: No pelvic masses.    ADDITIONAL FINDINGS: Trace pelvic free fluid which is nonspecific but  likely physiologic.    MUSCULOSKELETAL: No frankly destructive bony lesions.      Impression    IMPRESSION: Bilateral pyelonephritis, no complication demonstrated.     MAHNAZ LA MD         SYSTEM ID:  [id number]       Discharge Medications   Discharge Medication List as of 1/13/2025  7:26 PM        CONTINUE these medications which have NOT CHANGED    Details   acetaminophen (TYLENOL) 325 MG tablet Take 325-650 mg by mouth every 6 hours as needed for mild pain, Historical      Ashwagandha 500 MG CAPS Take 500 mg by mouth daily, Historical      escitalopram (LEXAPRO) 5 MG tablet Take 5 mg by mouth daily, Historical      Fenugreek 500 MG CAPS Take 500 mg by mouth daily, Historical      hypromellose (ARTIFICIAL TEARS) 0.5 % SOLN ophthalmic solution Place 1 drop into both eyes every hour as needed for dry eyes, Historical      ibuprofen (ADVIL/MOTRIN) 200 MG tablet Take 200 mg by mouth every 4 hours as needed for pain, Historical      insulin aspart (NOVOLOG PEN) 100 UNIT/ML pen Inject 1-5 Units Subcutaneous 4 times daily (with meals and nightly), Disp-15 mL, R-1, E-Prescribe      insulin glargine (BASAGLAR KWIKPEN) 100 UNIT/ML pen Inject 14 Units  Subcutaneous daily At noon, Disp-15 mL, R-0, E-PrescribeIf Basaglar is not covered by insurance, may substitute Lantus at same dose and frequency.        Debo 500 MG CAPS Take 500 mg by mouth daily, Historical      ondansetron (ZOFRAN ODT) 4 MG ODT tab Take 1 tablet (4 mg) by mouth every 8 hours as needed for nausea, Disp-10 tablet, R-0, E-Prescribe      Probiotic Product (ACIDOPHILUS) CHEW Take 1 tablet by mouth daily, Historical      acetone, Urine, test STRP 1 strip by In Vitro route as neededDisp-50 each, R-1Local Print      blood glucose monitoring (ACCU-CHEK FASTCLIX) lancets Use to test blood sugar 6 times daily or as directed., Disp-2 Box, R-6, E-Prescribe      blood glucose monitoring (ACCU-CHEK HENRI SMARTVIEW) meter device kit Use to test blood sugar 6 times daily or as directed.Disp-2 kit, [id number] kit home and 1 kit school      blood glucose monitoring (ACCU-CHEK SMARTVIEW) test strip Use to test blood sugar 6 times daily or as directed., Disp-200 each, R-6, E-Prescribe      Glucagon 0.5 MG/0.1ML SOSY Inject 1 ampule Subcutaneous daily as needed, Disp-1 mL, R-0, E-Prescribe      insulin pen needle (BD HENRI U/F) 32G X 4 MM Use 6 pen needles daily or as directed.Disp-200 each, [id number]           Allergies   No Known Allergies

## 2025-02-22 ENCOUNTER — HOSPITAL ENCOUNTER (EMERGENCY)
Facility: CLINIC | Age: 25
Discharge: HOME | End: 2025-02-22
Attending: EMERGENCY MEDICINE | Admitting: EMERGENCY MEDICINE
Payer: COMMERCIAL

## 2025-02-22 VITALS
WEIGHT: 119 LBS | SYSTOLIC BLOOD PRESSURE: 131 MMHG | RESPIRATION RATE: 16 BRPM | BODY MASS INDEX: 23.99 KG/M2 | HEART RATE: 91 BPM | TEMPERATURE: 98.3 F | HEIGHT: 59 IN | OXYGEN SATURATION: 100 % | DIASTOLIC BLOOD PRESSURE: 80 MMHG

## 2025-02-22 DIAGNOSIS — R73.9 HYPERGLYCEMIA: ICD-10-CM

## 2025-02-22 DIAGNOSIS — E10.65 TYPE 1 DIABETES MELLITUS WITH HYPERGLYCEMIA (H): ICD-10-CM

## 2025-02-22 LAB
ALBUMIN SERPL BCG-MCNC: 4.1 G/DL (ref 3.5–5.2)
ALBUMIN UR-MCNC: 30 MG/DL
ALP SERPL-CCNC: 118 U/L (ref 40–150)
ALT SERPL W P-5'-P-CCNC: 17 U/L (ref 0–50)
ANION GAP SERPL CALCULATED.3IONS-SCNC: 19 MMOL/L (ref 7–15)
APPEARANCE UR: CLEAR
AST SERPL W P-5'-P-CCNC: 21 U/L (ref 0–45)
B-OH-BUTYR SERPL-SCNC: 2.3 MMOL/L
BASE EXCESS BLDV CALC-SCNC: -1.8 MMOL/L (ref -3–3)
BASOPHILS # BLD AUTO: 0 10E3/UL (ref 0–0.2)
BASOPHILS NFR BLD AUTO: 0 %
BILIRUB SERPL-MCNC: 0.7 MG/DL
BILIRUB UR QL STRIP: NEGATIVE
BUN SERPL-MCNC: 13.8 MG/DL (ref 6–20)
CALCIUM SERPL-MCNC: 9 MG/DL (ref 8.8–10.4)
CHLORIDE SERPL-SCNC: 93 MMOL/L (ref 98–107)
COLOR UR AUTO: ABNORMAL
CREAT SERPL-MCNC: 0.79 MG/DL (ref 0.51–0.95)
EGFRCR SERPLBLD CKD-EPI 2021: >90 ML/MIN/1.73M2
EOSINOPHIL # BLD AUTO: 0 10E3/UL (ref 0–0.7)
EOSINOPHIL NFR BLD AUTO: 0 %
ERYTHROCYTE [DISTWIDTH] IN BLOOD BY AUTOMATED COUNT: 13.7 % (ref 10–15)
GLUCOSE BLDC GLUCOMTR-MCNC: 349 MG/DL (ref 70–99)
GLUCOSE SERPL-MCNC: 288 MG/DL (ref 70–99)
GLUCOSE UR STRIP-MCNC: >=1000 MG/DL
HCG SERPL QL: NEGATIVE
HCO3 BLDV-SCNC: 23 MMOL/L (ref 21–28)
HCO3 SERPL-SCNC: 20 MMOL/L (ref 22–29)
HCT VFR BLD AUTO: 40.4 % (ref 35–47)
HGB BLD-MCNC: 13.5 G/DL (ref 11.7–15.7)
HGB UR QL STRIP: NEGATIVE
IMM GRANULOCYTES # BLD: 0 10E3/UL
IMM GRANULOCYTES NFR BLD: 0 %
KETONES UR STRIP-MCNC: 80 MG/DL
LEUKOCYTE ESTERASE UR QL STRIP: NEGATIVE
LYMPHOCYTES # BLD AUTO: 1.4 10E3/UL (ref 0.8–5.3)
LYMPHOCYTES NFR BLD AUTO: 22 %
MAGNESIUM SERPL-MCNC: 1.4 MG/DL (ref 1.7–2.3)
MCH RBC QN AUTO: 29.4 PG (ref 26.5–33)
MCHC RBC AUTO-ENTMCNC: 33.4 G/DL (ref 31.5–36.5)
MCV RBC AUTO: 88 FL (ref 78–100)
MONOCYTES # BLD AUTO: 0.2 10E3/UL (ref 0–1.3)
MONOCYTES NFR BLD AUTO: 3 %
NEUTROPHILS # BLD AUTO: 5 10E3/UL (ref 1.6–8.3)
NEUTROPHILS NFR BLD AUTO: 75 %
NITRATE UR QL: NEGATIVE
NRBC # BLD AUTO: 0 10E3/UL
NRBC BLD AUTO-RTO: 0 /100
O2/TOTAL GAS SETTING VFR VENT: 21 %
OXYHGB MFR BLDV: 75 % (ref 70–75)
PCO2 BLDV: 39 MM HG (ref 40–50)
PH BLDV: 7.38 [PH] (ref 7.32–7.43)
PH UR STRIP: 6 [PH] (ref 5–7)
PLATELET # BLD AUTO: 242 10E3/UL (ref 150–450)
PO2 BLDV: 44 MM HG (ref 25–47)
POTASSIUM SERPL-SCNC: 4 MMOL/L (ref 3.4–5.3)
PROT SERPL-MCNC: 7.5 G/DL (ref 6.4–8.3)
RBC # BLD AUTO: 4.59 10E6/UL (ref 3.8–5.2)
RBC URINE: 1 /HPF
SAO2 % BLDV: 76.6 % (ref 70–75)
SODIUM SERPL-SCNC: 132 MMOL/L (ref 135–145)
SP GR UR STRIP: 1.03 (ref 1–1.03)
SQUAMOUS EPITHELIAL: 6 /HPF
TRANSITIONAL EPI: <1 /HPF
UROBILINOGEN UR STRIP-MCNC: NORMAL MG/DL
WBC # BLD AUTO: 6.7 10E3/UL (ref 4–11)
WBC URINE: 1 /HPF

## 2025-02-22 PROCEDURE — 81001 URINALYSIS AUTO W/SCOPE: CPT | Performed by: EMERGENCY MEDICINE

## 2025-02-22 PROCEDURE — 80053 COMPREHEN METABOLIC PANEL: CPT | Performed by: EMERGENCY MEDICINE

## 2025-02-22 PROCEDURE — 85041 AUTOMATED RBC COUNT: CPT | Performed by: EMERGENCY MEDICINE

## 2025-02-22 PROCEDURE — 82805 BLOOD GASES W/O2 SATURATION: CPT | Performed by: EMERGENCY MEDICINE

## 2025-02-22 PROCEDURE — 36415 COLL VENOUS BLD VENIPUNCTURE: CPT | Performed by: EMERGENCY MEDICINE

## 2025-02-22 PROCEDURE — 99284 EMERGENCY DEPT VISIT MOD MDM: CPT | Mod: 25 | Performed by: EMERGENCY MEDICINE

## 2025-02-22 PROCEDURE — 99284 EMERGENCY DEPT VISIT MOD MDM: CPT | Performed by: EMERGENCY MEDICINE

## 2025-02-22 PROCEDURE — 258N000003 HC RX IP 258 OP 636: Performed by: EMERGENCY MEDICINE

## 2025-02-22 PROCEDURE — 96361 HYDRATE IV INFUSION ADD-ON: CPT | Performed by: EMERGENCY MEDICINE

## 2025-02-22 PROCEDURE — 82962 GLUCOSE BLOOD TEST: CPT

## 2025-02-22 PROCEDURE — 82010 KETONE BODYS QUAN: CPT | Performed by: EMERGENCY MEDICINE

## 2025-02-22 PROCEDURE — 250N000011 HC RX IP 250 OP 636: Performed by: EMERGENCY MEDICINE

## 2025-02-22 PROCEDURE — 83735 ASSAY OF MAGNESIUM: CPT | Performed by: EMERGENCY MEDICINE

## 2025-02-22 PROCEDURE — 84703 CHORIONIC GONADOTROPIN ASSAY: CPT | Performed by: EMERGENCY MEDICINE

## 2025-02-22 PROCEDURE — 85004 AUTOMATED DIFF WBC COUNT: CPT | Performed by: EMERGENCY MEDICINE

## 2025-02-22 PROCEDURE — 96365 THER/PROPH/DIAG IV INF INIT: CPT | Performed by: EMERGENCY MEDICINE

## 2025-02-22 RX ORDER — MAGNESIUM SULFATE HEPTAHYDRATE 40 MG/ML
2 INJECTION, SOLUTION INTRAVENOUS ONCE
Status: COMPLETED | OUTPATIENT
Start: 2025-02-22 | End: 2025-02-22

## 2025-02-22 RX ADMIN — MAGNESIUM SULFATE HEPTAHYDRATE 2 G: 40 INJECTION, SOLUTION INTRAVENOUS at 13:54

## 2025-02-22 RX ADMIN — SODIUM CHLORIDE 1000 ML: 9 INJECTION, SOLUTION INTRAVENOUS at 13:16

## 2025-02-22 ASSESSMENT — COLUMBIA-SUICIDE SEVERITY RATING SCALE - C-SSRS
2. HAVE YOU ACTUALLY HAD ANY THOUGHTS OF KILLING YOURSELF IN THE PAST MONTH?: NO
1. IN THE PAST MONTH, HAVE YOU WISHED YOU WERE DEAD OR WISHED YOU COULD GO TO SLEEP AND NOT WAKE UP?: NO
6. HAVE YOU EVER DONE ANYTHING, STARTED TO DO ANYTHING, OR PREPARED TO DO ANYTHING TO END YOUR LIFE?: NO

## 2025-02-22 ASSESSMENT — ACTIVITIES OF DAILY LIVING (ADL)
ADLS_ACUITY_SCORE: 55
ADLS_ACUITY_SCORE: 55

## 2025-02-22 NOTE — ED PROVIDER NOTES
ED Provider Note  [facility]      History     Chief Complaint   Patient presents with    Hyperglycemia     Patient reports being in DKA using ketone test strips at home. Patient endorses being a type 1 diabetic.      MARIA G Wylie is a 24 year old female with DMT1 who presents with hyperglycemia. She states that she has been vomiting for about a week and noticed that she had ketones in her urine about 24 hours ago. She also has associated abdominal pain. Last meal was Thursday a she has not been able to keep food down. She as hospitalized 1/13/25 for DKA with had similar symptoms, left AMA. She denies any infectious symptoms but states she has a lot of stress in her life, but does not want to discuss further. She states that she has been taking her insulin regularly, with BS usually running in mid 200s.      Past Medical History  Past Medical History:   Diagnosis Date    C. difficile colitis     Diabetes type 1, uncontrolled     DKA (diabetic ketoacidosis) (H)     PID (acute pelvic inflammatory disease)      Past Surgical History:   Procedure Laterality Date    COLONOSCOPY N/A 9/18/2019    Procedure: COLONOSCOPY;  Surgeon: Jeremi Banks MD;  Location: [facility]     ESOPHAGOSCOPY, GASTROSCOPY, DUODENOSCOPY (EGD), COMBINED N/A 9/18/2019    Procedure: Upper endoscopy and colonoscopy with biopsy;  Surgeon: Jeremi Banks MD;  Location: [facility]      insulin aspart (NOVOLOG PEN) 100 UNIT/ML pen  acetaminophen (TYLENOL) 325 MG tablet  acetone, Urine, test STRP  Ashwagandha 500 MG CAPS  blood glucose monitoring (ACCU-CHEK FASTCLIX) lancets  blood glucose monitoring (ACCU-CHEK HENRI SMARTVIEW) meter device kit  blood glucose monitoring (ACCU-CHEK SMARTVIEW) test strip  escitalopram (LEXAPRO) 5 MG tablet  Fenugreek 500 MG CAPS  Glucagon 0.5 MG/0.1ML SOSY  hypromellose (ARTIFICIAL TEARS) 0.5 % SOLN ophthalmic solution  ibuprofen (ADVIL/MOTRIN) 200 MG tablet  insulin glargine  "(BASAGLAR KWIKPEN) 100 UNIT/ML pen  insulin pen needle (BD HENRI U/F) 32G X 4 MM  Debo 500 MG CAPS  ondansetron (ZOFRAN ODT) 4 MG ODT tab  Probiotic Product (ACIDOPHILUS) CHEW      No Known Allergies  Family History  Family History   Problem Relation Age of Onset    Diabetes No family hx of         type 1 diabetes or autoimmunity     Social History   Social History     Tobacco Use    Smoking status: Never    Smokeless tobacco: Never   Substance Use Topics    Alcohol use: Yes    Drug use: Yes     Types: Marijuana      A medically appropriate review of systems was performed with pertinent positives and negatives noted in the HPI, and all other systems negative.    Physical Exam   BP: 131/80  Pulse: 91  Temp: 98.3  F (36.8  C)  Resp: 16  Height: 149.9 cm (4' 11\")  Weight: 54 kg (119 lb)  SpO2: 100 %  Physical Exam  Exam:  Constitutional: healthy, alert, and no distress  Head: Normocephalic. No masses, lesions,  Neck: Neck supple. No adenopathy. Thyroid symmetric, normal size,  ENT: ENT exam normal, no neck nodes or sinus tenderness  Cardiovascular: negative,  RRR. No murmurs, clicks gallops or rub  Respiratory: negative, Lungs clear no kussmaul respirations normal work of breathing  Gastrointestinal: negative, Abdomen soft, non-tender. BS normal. No masses, organomegaly  : Deferred  Musculoskeletal: extremities normal- no gross deformities noted, and normal muscle tone  Skin: no suspicious lesions or rashes  Neurologic: Gait normal  Psychiatric: mentation appears normal and affect normal  Hematologic/Lymphatic/Immunologic: Normal cervical lymph nodes      ED Course, Procedures, & Data      Procedures                Results for orders placed or performed during the hospital encounter of 02/22/25   Glucose by meter     Status: Abnormal   Result Value Ref Range    GLUCOSE BY METER POCT 349 (H) 70 - 99 mg/dL   Comprehensive metabolic panel     Status: Abnormal   Result Value Ref Range    Sodium 132 (L) 135 - 145 mmol/L    " Potassium 4.0 3.4 - 5.3 mmol/L    Carbon Dioxide (CO2) 20 (L) 22 - 29 mmol/L    Anion Gap 19 (H) 7 - 15 mmol/L    Urea Nitrogen 13.8 6.0 - 20.0 mg/dL    Creatinine 0.79 0.51 - 0.95 mg/dL    GFR Estimate >90 >60 mL/min/1.73m2    Calcium 9.0 8.8 - 10.4 mg/dL    Chloride 93 (L) 98 - 107 mmol/L    Glucose 288 (H) 70 - 99 mg/dL    Alkaline Phosphatase 118 40 - 150 U/L    AST 21 0 - 45 U/L    ALT 17 0 - 50 U/L    Protein Total 7.5 6.4 - 8.3 g/dL    Albumin 4.1 3.5 - 5.2 g/dL    Bilirubin Total 0.7 <=1.2 mg/dL   Magnesium     Status: Abnormal   Result Value Ref Range    Magnesium 1.4 (L) 1.7 - 2.3 mg/dL   Ketone Beta-Hydroxybutyrate Quantitative     Status: Abnormal   Result Value Ref Range    Ketone (Beta-Hydroxybutyrate) Quantitative 2.30 (HH) <=0.30 mmol/L   Blood gas venous     Status: Abnormal   Result Value Ref Range    pH Venous 7.38 7.32 - 7.43    pCO2 Venous 39 (L) 40 - 50 mm Hg    pO2 Venous 44 25 - 47 mm Hg    Bicarbonate Venous 23 21 - 28 mmol/L    Base Excess/Deficit Venous -1.8 -3.0 - 3.0 mmol/L    FIO2 21     Oxyhemoglobin Venous 75 70 - 75 %    O2 Sat, Venous 76.6 (H) 70.0 - 75.0 %    Narrative    In healthy individuals, oxyhemoglobin (O2Hb) and oxygen saturation (SO2) are approximately equal. In the presence of dyshemoglobins, oxyhemoglobin can be considerably lower than oxygen saturation.   HCG qualitative pregnancy (blood)     Status: Normal   Result Value Ref Range    hCG Serum Qualitative Negative Negative   UA with Microscopic reflex to Culture     Status: Abnormal    Specimen: Urine, Clean Catch   Result Value Ref Range    Color Urine Light Yellow Colorless, Straw, Light Yellow, Yellow    Appearance Urine Clear Clear    Glucose Urine >=1000 (A) Negative mg/dL    Bilirubin Urine Negative Negative    Ketones Urine 80 (A) Negative mg/dL    Specific Gravity Urine 1.035 1.003 - 1.035    Blood Urine Negative Negative    pH Urine 6.0 5.0 - 7.0    Protein Albumin Urine 30 (A) Negative mg/dL    Urobilinogen  Urine Normal Normal, 2.0 mg/dL    Nitrite Urine Negative Negative    Leukocyte Esterase Urine Negative Negative    RBC Urine 1 <=2 /HPF    WBC Urine 1 <=5 /HPF    Squamous Epithelials Urine 6 (H) <=1 /HPF    Transitional Epithelials Urine <1 <=1 /HPF    Narrative    Urine Culture not indicated   CBC with platelets and differential     Status: None   Result Value Ref Range    WBC Count 6.7 4.0 - 11.0 10e3/uL    RBC Count 4.59 3.80 - 5.20 10e6/uL    Hemoglobin 13.5 11.7 - 15.7 g/dL    Hematocrit 40.4 35.0 - 47.0 %    MCV 88 78 - 100 fL    MCH 29.4 26.5 - 33.0 pg    MCHC 33.4 31.5 - 36.5 g/dL    RDW 13.7 10.0 - 15.0 %    Platelet Count 242 150 - 450 10e3/uL    % Neutrophils 75 %    % Lymphocytes 22 %    % Monocytes 3 %    % Eosinophils 0 %    % Basophils 0 %    % Immature Granulocytes 0 %    NRBCs per 100 WBC 0 <1 /100    Absolute Neutrophils 5.0 1.6 - 8.3 10e3/uL    Absolute Lymphocytes 1.4 0.8 - 5.3 10e3/uL    Absolute Monocytes 0.2 0.0 - 1.3 10e3/uL    Absolute Eosinophils 0.0 0.0 - 0.7 10e3/uL    Absolute Basophils 0.0 0.0 - 0.2 10e3/uL    Absolute Immature Granulocytes 0.0 <=0.4 10e3/uL    Absolute NRBCs 0.0 10e3/uL   CBC with platelets differential     Status: None    Narrative    The following orders were created for panel order CBC with platelets differential.  Procedure                               Abnormality         Status                     ---------                               -----------         ------                     CBC with platelets and d...[[id number]]                      Final result                 Please view results for these tests on the individual orders.     Medications   sodium chloride 0.9% BOLUS 1,000 mL (1,000 mLs Intravenous $New Bag 2/22/25 1160)   magnesium sulfate 2 g in 50 mL sterile water intermittent infusion (2 g Intravenous $New Bag 2/22/25 0068)     Labs Ordered and Resulted from Time of ED Arrival to Time of ED Departure - No data to display  No orders to display           Critical care was not performed.     Medical Decision Making  The patient's presentation was of high complexity (an acute health issue posing potential threat to life or bodily function).    The patient's evaluation involved:  ordering and/or review of 3+ test(s) in this encounter (see separate area of note for details)    The patient's management necessitated moderate risk (limitations due to social determinants of health (healthcare access difficulty)).    Assessment & Plan    Myriam Wylie is a 24 year old female with DMT1 who presents with hyperglycemia with concerns for DKA. She is vitally stable with reassuring physical exam. DKA Labs were drawn. VBG was reassuring with PH of 7.38. Mg low (1.4) UA stable from prior visits. CBC WNL, Ketones elevated (2.3). Based of of lab findings patient not currently in DKA. She is vitally stable and her physical exam is also reassuring. She is receiving fluids and mg was replaced. She last took her insulin this AM. Discussed results with patient and importance of taking insulin as prescribed. Patient comfortable with plan and okay to manage symptoms from home.Will discharge with return precautions and send insulin script to pharmacy.    -LABS: CBC, CMP, Ketones, VBG, Mg, HCG   -UA  -BG   -IV NS bolus 1L   -Mg sulfate IV      I have reviewed the nursing notes. I have reviewed the findings, diagnosis, plan and need for follow up with the patient.    Current Discharge Medication List          Final diagnoses:   Hyperglycemia     Poonam Chávez MS    --    ED Attending Physician Attestation    I Jonathan Espinal MD, cared for this patient with the Medical Student. I performed, or re-performed, the physical exam and medical decision-making. I have verified the accuracy of all the medical student findings and documentation above, and have edited as necessary.    Summary of HPI, PE, ED Course   Patient is a 24 year old female evaluated in the emergency department for  hyperglycemia. Exam and ED course notable for labs with hyperglycemia.  She does not have DKA based on blood gas but she does have ketones.  She is tolerating p.o. intake and appears quite well.  She is absolute no symptoms at this point her vitals are completely stable.  Did offer this patient admission for glucose control however she declined this.  She does have insulin at home.  She says she was out of her short acting so I did refill this.  She does have her long-acting.  Strong encouraged her to return to the emergency department if she has new further concerns.  Her white blood count here is normal.  No signs of infection.  Patient understands and knows she can return anytime.. After the completion of care in the emergency department, the patient was discharged.      Jonathan Espinal MD  Emergency Medicine     Jonathan Espinal MD  [facility] EMERGENCY DEPARTMENT  2/22/2025     Jonathan Espinal MD  02/22/25 0210

## 2025-02-22 NOTE — DISCHARGE INSTRUCTIONS
Return to Emergency Department if you develop worsening symptoms, inability to refill your insulin or if you have any further concerns.

## 2025-02-22 NOTE — ED TRIAGE NOTES
Patient reports she is in DKA. Patient is type 1 diabetic. Patient reports she has been vomiting for the past two days. Patient reports she is struggling to keep fluids down. Patient reports she has been using ketone strips at home. Patient reports she has been trying to manage at home with insulin but has been unsuccessful. Patient states she was hospitalized with DKA last month. Patient endorses a headache and stomach discomfort. Patient endorses chance of pregnancy.

## 2025-04-20 ENCOUNTER — HEALTH MAINTENANCE LETTER (OUTPATIENT)
Age: 25
End: 2025-04-20

## 2025-08-03 ENCOUNTER — HEALTH MAINTENANCE LETTER (OUTPATIENT)
Age: 25
End: 2025-08-03

## (undated) DEVICE — KIT ENDO TURNOVER/PROCEDURE CARRY-ON 101822

## (undated) DEVICE — PAD CHUX UNDERPAD 30X36" P3036C

## (undated) DEVICE — SOL WATER IRRIG 1000ML BOTTLE 2F7114

## (undated) DEVICE — ENDO BITE BLOCK PEDS BATRIK LATEX FREE B1

## (undated) DEVICE — SPECIMEN CONTAINER W/20ML 10% BUFF FORMALIN C4322-11

## (undated) DEVICE — TUBING SUCTION MEDI-VAC 1/4"X20' N620A

## (undated) DEVICE — ENDO FORCEP ENDOJAW BIOPSY 2.8MMX230CM FB-220U

## (undated) DEVICE — KIT CONNECTOR FOR OLYMPUS ENDOSCOPES DEFENDO 100310

## (undated) DEVICE — ENDO TUBING W/CAP AUXILARY WATER INLET 100609 EGA-500

## (undated) RX ORDER — FENTANYL CITRATE 50 UG/ML
INJECTION, SOLUTION INTRAMUSCULAR; INTRAVENOUS
Status: DISPENSED
Start: 2019-09-18

## (undated) RX ORDER — PHENYLEPHRINE HCL IN 0.9% NACL 1 MG/10 ML
SYRINGE (ML) INTRAVENOUS
Status: DISPENSED
Start: 2019-09-18

## (undated) RX ORDER — PROPOFOL 10 MG/ML
INJECTION, EMULSION INTRAVENOUS
Status: DISPENSED
Start: 2019-09-18

## (undated) RX ORDER — GLYCOPYRROLATE 0.2 MG/ML
INJECTION INTRAMUSCULAR; INTRAVENOUS
Status: DISPENSED
Start: 2019-09-18